# Patient Record
Sex: FEMALE | Race: BLACK OR AFRICAN AMERICAN | NOT HISPANIC OR LATINO | Employment: OTHER | URBAN - METROPOLITAN AREA
[De-identification: names, ages, dates, MRNs, and addresses within clinical notes are randomized per-mention and may not be internally consistent; named-entity substitution may affect disease eponyms.]

---

## 2017-11-13 ENCOUNTER — TRANSFERRED RECORDS (OUTPATIENT)
Dept: HEALTH INFORMATION MANAGEMENT | Facility: CLINIC | Age: 52
End: 2017-11-13

## 2017-11-14 ENCOUNTER — TRANSFERRED RECORDS (OUTPATIENT)
Dept: HEALTH INFORMATION MANAGEMENT | Facility: CLINIC | Age: 52
End: 2017-11-14

## 2017-12-02 ENCOUNTER — TRANSFERRED RECORDS (OUTPATIENT)
Dept: HEALTH INFORMATION MANAGEMENT | Facility: CLINIC | Age: 52
End: 2017-12-02

## 2017-12-05 ENCOUNTER — OFFICE VISIT (OUTPATIENT)
Dept: ENDOCRINOLOGY | Facility: CLINIC | Age: 52
End: 2017-12-05

## 2017-12-05 VITALS
WEIGHT: 186.8 LBS | HEIGHT: 64 IN | SYSTOLIC BLOOD PRESSURE: 132 MMHG | HEART RATE: 62 BPM | DIASTOLIC BLOOD PRESSURE: 88 MMHG | BODY MASS INDEX: 31.89 KG/M2

## 2017-12-05 DIAGNOSIS — N20.0 KIDNEY STONE: Primary | ICD-10-CM

## 2017-12-05 DIAGNOSIS — M81.8 OTHER OSTEOPOROSIS WITHOUT CURRENT PATHOLOGICAL FRACTURE: ICD-10-CM

## 2017-12-05 NOTE — PROGRESS NOTES
[facility]inology [facility]patient [facility]    CC: F/u visit for pituitary macroadenoma.     HPI: Mrs. Flores is a 52 year old [place] woman that is seen today for follow-up of a pituitary macroadenoma. The interview was facilitated by an . The patient was last seen in the endocrine clinic in 6/2015.   Briefly, Mrs. Flores  had a pituitary macroadenoma diagnosed in 2008. At the time, Mrs. Flores was complaining of headaches. She had a MRI done which revealed a sellar mass, with no evidence of hormone excess or deficiency at that time. On 7/08, cortisol was 19.3, prolactin 8, TSH 1.3, FT4 0.76 (replaced with Levothyroxine for couple of months and then discontinued), LH 35, FSH 58, and normal alpha subunit. On 4/09, cortisol 14.6, TSH 2.84, FT4 1.06, prolactin 6, IGF-1 111. She was seen by Dr. Omer in our clinic initially in 2/2010, and repeated hormonal evaluation was essentially unchanged. She was seen by neurophthalmology in 2/2010, and was found to have no visual field defects.     When she was seen in the endocrine clinic in 2015, it was found that her tumor had been stable in size. There were some concern for Cushing's disease based on a positive dexamethasone suppression test on 12/14/13 but a second test on 1/17/12 was normal. Subsequent tests showed increased ACTH and increased free urinary cortisol.  A dexamethasone tests was ordered however it is unclear whether or not the patient did the test.  At some point she reported she had it done in [place], taking the dexamethasone at 11 PM on a Sunday, and having blood work done at 7:30 AM the next morning. I never received the results of these tests and was not able to locate them.    Ms. Flores continues to complain to report headache.  Headache has been noted since 2008, but the frequency and intensity of the headache episodes have varied over the years. Tylenol offers good relief.  Headaches are pressure-like, specially in the temporal and  occipital areas, some as prior. Mrs. Flores have a history of nausea and some dizziness that started at about the same time as the headaches, but this has now improved.  She also reports her eyesight is getting worse, with no evident field defect. The patient denies galactorrhea or breast symptoms. Last menstrual period was about 16 years ago, after having her last baby, which she breast fed for 3 years. Menarche was at age of 13 with regular periods afterwards. She now reports she has been gaining some weight. She denies polyuria or polydipsia.     The patient was seen at [facility] not long ago, and reports having multiple tests done there.  I do not have access to these records and will try to obtain information from [facility] while the patient is  in clinic.  Previous MRI (2015 showed that the pituitary macroadenoma was stable in size (see below).    Mrs. Flores had a right leg (knee) fracture in 11/2008. She was found to have mild vitamin D deficiency, with 25-OH vitamin D levels of 24, and a PTH of 115. She was placed on vitamin D2 50,000 units per week for 12 weeks, followed by maintenance with vitamin D3 2,000 units daily.  Vitamin D is no longer on her medication list and she is not sure whether or not she is taking it regularly.    She was diagnosed with hypertension and is on hydrochlorothiazide 12.5 mg/day. Mrs. Flores denies other health issues.     The tumor size was reported as follows:   7/08 1.4 x 2.1 x 1.3 cm   5/09 1.5 x 2.3 x 1.3 cm   12/09 1.5 x 2.5 x 1.6 cm   6/10 1.3 x 2.3 x 1.3 cm   11/11 2.0 x 1.2 cm  1/14 1.8 x 0.9 cm  4/15 1.8 x 1.2 x 1.9 cm     ROS:  11 point ROS as detailed in the HPI, below, or negative  Constitutional: no fevers, chills, fatigue, unintentional weight gain/loss, or change in appetite   HEENT: Endorses headaches and no vision changes; no diplopia  CV: no chest pain or palpitations.   RESP: no dyspnea, cough, SOB.   GI: no vomiting.    Endo: no polyuria, polydipsia,  "temperature intolerance or sleep problems     Medications  Current Outpatient Prescriptions   Medication     neomycin-polymyxin-dexamethasone (MAXITROL) 3.5-20006-0.1 SUSP     ibuprofen 200 MG capsule     dexamethasone (DECADRON) 1 MG tablet     hydrochlorothiazide (MICROZIDE) 12.5 MG capsule     pantoprazole sodium 40 MG tablet     indomethacin (INDOCIN) 50 MG capsule     UNKNOWN TO PATIENT     UNKNOWN TO PATIENT     Polyethylene Glycol POWD     ranitidine (ZANTAC) 150 MG tablet     Albuterol Sulfate (VENTOLIN HFA) 108 (90 BASE) MCG/ACT AERS     No current facility-administered medications for this visit.      Family Hx   No Family Hx of pituitary disease, asthma or osteoporosis.   Has 4 healthy children, 24 yrs old girl, 18 yrs old twin boys and 16 yrs old boy.     Social Hx   Behavioral history: No tobacco use.   Home environment: No secondhand tobacco smoke in home.   Mrs. Flores does not work.   She lives with her  and their 4 children. Mrs. Flores lives in [place].  She does not smoke or consume alcohol.     PMH   Illnesses:   Non functioning (?) pituitary tumor as per HPI   Premature ovarian failure   Bone fracture   Surgeries:   Right knee (11/2008)     Menstrual History:   Menarche at age 13   Cycles were regular   Gestae 8 Para 4 (one gemellar pregnancy)   Menopause 14 yrs ago.     Physical Exam:  /88 (BP Location: Right arm, Patient Position: Sitting, Cuff Size: Adult Regular)  Pulse 62  Ht 1.626 m (5' 4\")  Wt 84.7 kg (186 lb 12.8 oz)  BMI 32.06 kg/m2  There is no height or weight on file to calculate BMI.  General : Alert, oriented X4, no acute distress, no pallor, jaundice or cyanosis.   HEENT : NCAT, EOIM, no scleral icterus or proptosis. Gross evaluation of the visual fields by confrontation is normal/small defect on the upper lateral gaze    thyroid/lymph: no LN enlargement, thyroid is normal in size   Lungs : rhonchi scattered throughout, normal excursion   CV: Normal S1, Normal S2, " No Murmurs   Abdomen : soft and lax, non tender, no hepatosplenomegaly, no ascites, bowel sounds are present.   Neuro : CN II-XII intact; tone, power and reflexes are normal and symmetrical in upper and lower extremities, no tremor of outstretched hands   Extremities : pulses are ++ in both limbs in upper and lower extremities, no ulcers, no edema.   skin: normal temperature, turgor, texture and thickness; no rashes on exposed skin. No hyperpigmentation of palmar creases   Psychological: appropriate mood and affect    Imaging Results   MRI BRAIN 16 Jun 2010 - MRI BRAIN W AND WO CONTRAST*   Findings: A sellar mass is noted measuring 1.3 x 2.3 x 1.3 cm (AP by transverse by craniocaudal, not significantly changed from remeasurement of comparison MRI 12/16/2009. The heterogeneously enhancing mass expands the sella with iso to hyperintensity on T1-weighted images and mild hyperintensity on T2-weighted images. The pituitary stalk is slightly deviated to the right, unchanged. There is no displacement of the optic chiasm. The carotid flow-voids are patent.   Trace periventricular T2 hyperintensity is again noted with a small focal area of increased T2 signal adjacent to the lateral horn of the left lateral ventricles similar to outside MRI 5/12/2009.   Impression: No change in size of sellar mass lesion compatible with a pituitary macroadenoma.     DEXA HIP PELVIS SPINE 12 May 2010   Presuming that the patient is not postmenopausal, the lowest and valid Z-score of -4.8 at the level of the lumbar spine is BELOW the expected range for age. (see Ref. #3) According to the ISCD position statements at www.iscd.org, a Z-score more negative than -2.0 is below expected range for age. Note the wide range in BMD values and T- scores within the lumbar   spine (L2 at -3.5, L3 at -2.3). This pattern suggests degenerative joint disease or occult fractures at the lumbar level that would limit the detection of low bone density in the L1 -  L4 spine region. The lumbar spine is therefore represented by L1-L2. Note the very negative Z- score within the lumbar spine.This suggests that secondary causes of low bone density might be present. Repeat DXA testing in 1-2 years could be considered. Clinical correlation recommended.     CHEST TWO VIEW* 04 Feb 2010 1  Findings: Haziness is seen within the lower lungs which likely represents summation shadow. No definite airspace opacities are seen. Cardiac silhouette and pulmonary vasculature are within normal limits. There is no pneumothorax.   Impression: No acute airspace opacity.     Brain/Pituitary MRI without and with contrast  Findings:   1.8 x 0.9 cm hypoenhancing left sellar mass (series 13, image 10), previously 2.2 x 1.2 cm on 11/30/2011 and 2.5 x 1.3 cm on 11/8/2010. On coronal images, the mass now demonstrates a concave superior margin, previously convex on 11/8/2010 and intermediate 11/30/2011. The mass continues to invade the left cavernous sinus with enhancing  soft tissue encasing the cavernous portion of the left carotid artery and displacing the right internal carotid artery. Carotid flow-voids are patent. There is erosion of the dorsum sella. The pituitary sac is deviated to the right. There is preserved CSF space between the mass and the optic chiasm and optic nerves. The ventricles are not enlarged  out of proportion to the surgical sulci. Diffusion-weighted images demonstrate no evidence of acute infarct. No intracranial hemorrhage, mass effect, midline shift or extra-axial  fluid collection. Minimal nonspecific punctate periventricular white matter T2 FLAIR hyperintensities, unchanged since previous exam. Calvarium and orbits are unremarkable. The paranasal sinuses and mastoid air cells are clear.  IMPRESSION  1. Decrease in size of left sellar mass, which invades the left cavernous sinus and encases the cavernous carotid artery, compatible with known pituitary macroadenoma. The mass does  not abut the optic chiasm or nerves. No hydrocephalus.  2. Stable minimal nonspecific periventricular white matter T2 FLAIR hyperintensity, which may represent early onset chronic small vessel ischemic disease or sequelae of prior vasculopathy, Infection/inflammation.    4/17/15  Brain/Pituitary MRI without and with contrast  History: Benign neoplasm of pituitary gland and craniopharyngeal duct (pouch).   Comparison: MRI dated 1/2/14   Findings: Again noted is a mass within the sella predominantly along the left lateral aspect with extension into the left cavernous sinus. The mass  appears isointense on the T1 and hypointense on the T2-weighted images and demonstrates enhancement. It encases the cavernous segment of the  left ICA. The mass measures 1.8 x 1.2 x 1.9 cm (TR, CC, AP), and is unchanged in size and appearance since prior study. There is no  extension into the suprasellar cistern. No mass effect on the optic chiasm. The pituitary stalk is displaced to the level right as before.  The major cavernous carotid vascular flow-voids appear patent.  No mass effect, midline shift, or significant enlargement of the  ventricles. Minimal T2 hyperintensity are seen in the bilateral periventricular white matter are nonspecific and unchanged since prior  study.  Impression:   1. Stable appearance of mass within the sella predominantly along the left lateral aspect with extension into the left cavernous sinus,  consistent with known pituitary macroadenoma. No mass effect on the optic chiasm. No hydrocephalus.  2. Unchanged minimal nonspecific periventricular white matter T2 hyperintensities likely representing early onset of chronic small  vessel ischemic changes.    Lab results  ENDO PITUITARY LABS-[facility] Latest Ref Rng 1/1/2014 12/3/2013   TSH 0.4 - 5.0 mU/L  1.54   PROLACTIN 3 - 27 ug/L  5   PROLACTIN   4.4   CORTISOL FREE, URINE  69.5 (H)    CORTISOL FREE, URINE RANDOM  45.70    CORTISOL UG/G CRT  47.60    CORTISOL,  SERUM 4 - 22 ug/dL  11.9   ADRENAL CORTICOTROPIN 10 - 47 pg/mL  200 (H)   FSH   50.5   LUTROPIN   26.6   ESTRADIOL   30    - 144 mmol/L  143   POTASSIUM 3.4 - 5.3 mmol/L  3.9       ENDO PITUITARY LABS-[facility] [[email or web address] Latest Ref Rng 4/12/2012   CORTISOL FREE, URINE  17.3   CORTISOL FREE, URINE RANDOM  10.90   CORTISOL UG/G CRT  21.80     [facility]2012   CORTISOL FREE, URINE  29.7   CORTISOL FREE, URINE RANDOM  53.00   CORTISOL UG/G CRT  44.17     [facility] Latest Ref Rng 1/17/2012   CORTISOL, SERUM 4 - 22 ug/dL 14.9   ADRENAL CORTICOTROPIN 10 - 47 pg/mL 197 (H)    - 144 mmol/L 143   POTASSIUM 3.4 - 5.3 mmol/L 4.0     [facility] Latest Ref Rng 12/14/2011   CORTISOL, SERUM 4 - 22 ug/dL 3.4 (L)   ADRENAL CORTICOTROPIN 10 - 47 pg/mL 72 (H)     [facility] Latest Ref Rng 12/9/2011   CORTISOL FREE, URINE  41.2   CORTISOL FREE, URINE RANDOM  24.70   CORTISOL UG/G CRT  34.31     [facility] Latest Ref Rng 12/7/2011   TSH 0.4 - 5.0 mU/L 1.10   PROLACTIN 3 - 27 ug/L 6   CORTISOL, SERUM 4 - 22 ug/dL 13.6   ADRENAL CORTICOTROPIN 10 - 47 pg/mL 139 (H)   FSH  43.0   LUTROPIN  22.6   INSULIN GROWTH FACTOR 1 94 - 252 ng/mL 78 (L)   INSULIN GROWTH FACTOR 1 SD SCORE  NEG 2.4   GLUCOSE 60 - 99 mg/dL 82     [facility] Latest Ref Rng 11/8/2010   CORTISOL FREE, URINE  31.3   CORTISOL FREE, URINE RANDOM  32.20   CORTISOL UG/G CRT  33.89     [facility] Latest Ref Rng 11/2/2010   TSH 0.4 - 5.0 mU/L 1.44   PROLACTIN 3 - 27 ug/L 6   CORTISOL, SERUM 4 - 22 ug/dL 15.8   ADRENAL CORTICOTROPIN 10 - 47 pg/mL 253 (H)   FSH  45.7   LUTROPIN  28.1   ESTRADIOL  19   URINE OSMOLALITY 100 - 1200 mmol/kg 727   INSULIN GROWTH FACTOR 1 94 - 252 ng/mL 140   INSULIN GROWTH FACTOR 1 SD SCORE  NEG 1.1   GLUCOSE 60 - 99 mg/dL 83     END[facility] PITUITARY LABS-[email or web address] Latest Ref Rng 5/6/2010 2/4/2010   TSH 0.4 - 5.0 mU/L 1.62 1.74   PROLACTIN 3 - 27 ug/L  7    CORTISOL, SERUM 4 - 22 ug/dL  14.0   FSH   49.8   LUTROPIN   33.7   ESTRADIOL   11   INSULIN GROWTH FACTOR 1 94 - 252 ng/mL  101   INSULIN GROWTH FACTOR 1 SD SCORE   NEG 2.0   GLUCOSE 60 - 99 mg/dL       ENDO PITUITARY LABS-[facility] Latest Ref Rng 4/15/2009   TSH 0.4 - 5.0 mU/L 2.84   PROLACTIN 3 - 27 ug/L 6   CORTISOL, SERUM 4 - 22 ug/dL 14.6   TESTOSTERONE TOTAL 14 - 75 ng/dL 35   INSULIN GROWTH FACTOR 1 94 - 252 ng/mL 111   INSULIN GROWTH FACTOR 1 SD SCORE  NEG 1.8       Assessment and Plan:  Mrs. Flores is a 52 year old woman with a pituitary macroadenoma.   1. Pituitary macroadenoma: Previous evaluation suggested this is a non-functioning lesion that is growing slowly. There was no clinical or biochemical evidence of hyperprolactinemia. LH and FSH levels were appropriately elevated in the setting of primary ovarian failure. Patient had symptoms suggestive of hypothyroidism and was placed on levothyroxine in the past. Repeated TFT after hormone replacement was discontinued were normal. Mrs. Flores has normal IGF-1 levels and has no signs or symptoms of diabetes insipidus. However, I was concern the patient had Cushing's disease. ACTH levels and 24h-urinary cortisol were elevated. I had asked Ms. Flores to do a dexamethasone suppression test when I last saw her, and also had my nursing staff following up with her in that regard, but despite the fact that the pt believes she did it, I cannot find evidence that she did draw blood early AM for cortisol levels (this would presumptively be after dexamethasone on the prior night).  I will wait for the records from [facility], as patient states all was found to be normal, to determine whether or not additional work-up is needed. I will again refer the patient to opthalmology, for a formal campimetry.    2. Vitamin D deficiency: Mrs. Flores has a history of bone fracture and low vitamin D levels, along with increased PTH levels. Patient is on vitamin D replacement.  We  will consider repeating her DEXA scan.    Plan was discussed with patient in detail, with the help of an , and she verbalizes understanding.  The patient has previously provided me with her daughter's phone number [phone], [place], and she gave me authorization to discuss any aspect related to her health with her daughter.      Orders Placed This Encounter   Procedures     OPHTHALMOLOGY ADULT REFERRAL       Maine James MD PhD    Division of Endocrinology and Diabetes      Addendum:  I received a fax with the data from patient's recent evaluation at [facility]. No images were sent.    Results from November 14, 2017, collect at 9:15 AM, show:  CBC: normal; 25-hydroxy vitamin D: Total 20, D3 20, D2 <4  Sodium 141, potassium 4.5, calcium 9.7, glucose at 85, alkaline phosphatase 87, AST 16, creatinine 0.8, albumin 4.0.  TSH 1.7, free T4 0.9, prolactin 7.3, FSH 44.8, IGF-I 90; ACTH 160 (7.2-63); cortisol 11  24 hour urine collection: Urinary volume 1390 ml; Calcium 167 mg per 24 hours, creatinine 917 mg per 24 hours, cortisol 43  g per 24 hours    DEXA scan was done on that same day and showed lowest T-score is -3.5    Brain MRI - November 7, 2017, interpretation reads as follows:   Large pituitary adenoma present in 2009 and 2010 and has sparsely regressed in the 2011 exam and almost completely resolved with in the 2015 and current exam.  Focal defect in the left side of the pituitary gland, where the prior mass was once noted. The normal plan this displaced to the right, with moderate rightward deviation of the pituitary stalk. There remains some hypoenhancing material in the medial aspect of the left cavernous sinus of uncertain significance; the initial studies showed a subtotal encasement of the left cavernous ICA by tumor and that this material has almost completely resolved except for the his small residual hypoenhancing region.  The component of the tumor extending  posteriorly over the left Meckel's cave was present on the initial 3 studies, but that this component has also almost completely resolved.  No evidence of new tumor.  The suprasellar cistern is free of mass.  The mild to generalized cerebral and cerebral volume loss is stable since 2009.  Reminder normal.    Based on these results, we will intensify vitamin D replacement therapy and discuss bisphosphonate use and plan again for a dexamethasone suppression test.  We will try to obtain a CD with the patient's most recent  MRI.    Maine James MD PhD    Division of Endocrinology and Diabetes

## 2017-12-05 NOTE — MR AVS SNAPSHOT
After Visit Summary   12/5/2017    Francis Flores    MRN: [id number]           Patient Information     Date Of Birth          1965        Visit Information        Provider Department      12/5/2017 11:15 AM Chantell James MD; [employer] [facility] Endocrinology        Today's Diagnoses     Kidney stone    -  1    Other osteoporosis without current pathological fracture           Follow-ups after your visit        Additional Services     OPHTHALMOLOGY ADULT REFERRAL       Your provider has referred you to: [facility]: [facility] [place] [phone]   [email or web address]    Please be aware that coverage of these services is subject to the terms and limitations of your health insurance plan.  Call member services at your health plan with any benefit or coverage questions.      Please bring the following with you to your appointment:    (1) Any X-Rays, CTs or MRIs which have been performed.  Contact the facility where they were done to arrange for  prior to your scheduled appointment.    (2) List of current medications  (3) This referral request   (4) Any documents/labs given to you for this referral                  Follow-up notes from your care team     Return in about 4 months (around 4/5/2018).      Your next 10 appointments already scheduled     Dec 08, 2017  1:00 PM CST   (Arrive by 12:45 PM)   NEW GENERAL with Alberto Spear OD   [facility] Ophthalmology ([facility])    [address] [place] [address]  [address] [facility] [address] [phone]            May 01, 2018 12:00 PM CDT   (Arrive by 11:45 AM)   RETURN ENDOCRINE with Chantell James MD   [facility] End[facility] ([facility])    [address] [place] [address]  [address] [facility] [address] [phone]              Who to contact     Please call your clinic at [phone] to:    Ask questions about your  "health    Make or cancel appointments    Discuss your medicines    Learn about your test results    Speak to your doctor   If you have compliments or concerns about an experience at your clinic, or if you wish to file a complaint, please contact [facility] Patient Relations at [phone] or email us at [email or web address]         Additional Information About Your Visit        Out[employer] System[employer]art Information     [employer]apest[employer] is an electronic gateway that provides easy, online access to your medical records. With Forgame, you can request a clinic appointment, read your test results, renew a prescription or communicate with your care team.     To sign up for For[employer] visit the website at [email or web address]   You will be asked to enter the access code listed below, as well as some personal information. Please follow the directions to create your username and password.     Your access code is: [email or web address]  Expires: 2018  6:31 AM     Your access code will  in 90 days. If you need help or a new code, please contact your [facility] or call [phone] for assistance.        Care EveryWhere ID     This is your Care EveryWhere ID. This could be used by other organizations to access your [facility] medical records  [id number]        Your Vitals Were     Pulse Height BMI (Body Mass Index)             62 1.626 m (5' 4\") 32.06 kg/m2          Blood Pressure from Last 3 Encounters:   17 132/88   06/10/15 111/64   04/08/15 120/77    Weight from Last 3 Encounters:   17 84.7 kg (186 lb 12.8 oz)   06/10/15 80.7 kg (177 lb 14.4 oz)   04/08/15 80.2 kg (176 lb 14.4 oz)              We Performed the Following     OPHTHALMOLOGY ADULT REFERRAL          Today's Medication Changes          These changes are accurate as of: 17 12:45 PM.  If you have any questions, ask your nurse or doctor.               Stop taking these medicines if you haven't " already. Please contact your care team if you have questions.     dexamethasone 1 MG tablet   Commonly known as:  DECADRON   Stopped by:  Chantell James MD           hydrochlorothiazide 12.5 MG capsule   Commonly known as:  MICROZIDE   Stopped by:  Chantell James MD           indomethacin 50 MG capsule   Commonly known as:  INDOCIN   Stopped by:  Chantell James MD           neomycin-polymyxin-dexamethasone 3.5-59294-4.1 Susp ophthalmic susp   Commonly known as:  MAXITROL   Stopped by:  Chantell James MD           Polyethylene Glycol Powd   Stopped by:  Chantell James MD           UNKNOWN TO PATIENT   Stopped by:  Chantell James MD           VENTOLIN  (90 BASE) MCG/ACT Inhaler   Generic drug:  albuterol   Stopped by:  Chantell James MD                    Primary Care Provider Office Phone # Fax #    Eliu Dewey [phone]       [facility] [address] [facility] [address]        Equal Access to Services     [facility]: Hadii aad ku hadasho Socourtney, waaxda luqadaha, qaybta kaalmada dana, holly foley . So [facility] [phone].    ATENCIÓN: Si habla español, tiene a nunez disposición servicios gratuitos de asistencia lingüística. [facility] [phone].    We comply with applicable federal civil rights laws and [place] laws. We do not discriminate on the basis of race, color, national origin, age, disability, sex, sexual orientation, or gender identity.            Thank you!     Thank you for choosing [facility] ENDOCRINOLOGY  for your care. Our goal is always to provide you with excellent care. Hearing back from our patients is one way we can continue to improve our services. Please take a few minutes to complete the written survey that you may receive in the mail after your visit with us. Thank you!             Your Updated  Medication List - Protect others around you: Learn how to safely use, store and throw away your medicines at www.disposemymeds.org.          This list is accurate as of: 12/5/17 12:45 PM.  Always use your most recent med list.                   Brand Name Dispense Instructions for use Diagnosis    ibuprofen 200 MG capsule     60 capsule    Take 400 mg by mouth every 4 hours as needed for fever    Pituitary adenoma (H)       pantoprazole sodium 40 MG packet    PROTONIX     Take 1 packet by mouth daily.        ranitidine 150 MG tablet    ZANTAC     Take 1 tablet by mouth 2 times daily.

## 2017-12-05 NOTE — LETTER
12/5/2017       RE: Francis Flores  [address]   [facility] [address]     Dear Colleague,    Thank you for referring your patient, Francis Flores, to the [facility] ENDOCRINOLOGY at [facility]. Please see a copy of my visit note below.    Endocrinology and Diabetes Outpatient Clinic    CC: F/u visit for pituitary macroadenoma.     HPI: Mrs. Flores is a 52 year old [place] woman that is seen today for follow-up of a pituitary macroadenoma. The interview was facilitated by an . The patient was last seen in the endocrine clinic in 6/2015.   Briefly, Mrs. Flores  had a pituitary macroadenoma diagnosed in 2008. At the time, Mrs. Flores was complaining of headaches. She had a MRI done which revealed a sellar mass, with no evidence of hormone excess or deficiency at that time. On 7/08, cortisol was 19.3, prolactin 8, TSH 1.3, FT4 0.76 (replaced with Levothyroxine for couple of months and then discontinued), LH 35, FSH 58, and normal alpha subunit. On 4/09, cortisol 14.6, TSH 2.84, FT4 1.06, prolactin 6, IGF-1 111. She was seen by Dr. Omer in our clinic initially in 2/2010, and repeated hormonal evaluation was essentially unchanged. She was seen by neurophthalmology in 2/2010, and was found to have no visual field defects.     When she was seen in the endocrine clinic in 2015, it was found that her tumor had been stable in size. There were some concern for Cushing's disease based on a positive dexamethasone suppression test on 12/14/13 but a second test on 1/17/12 was normal. Subsequent tests showed increased ACTH and increased free urinary cortisol.  A dexamethasone tests was ordered however it is unclear whether or not the patient did the test.  At some point she reported she had it done in [place], taking the dexamethasone at 11 PM on a Sunday, and having blood work done at 7:30 AM the next morning. I never received the results of these tests and was  not able to locate them.    Ms. Flores continues to complain to report headache.  Headache has been noted since 2008, but the frequency and intensity of the headache episodes have varied over the years. Tylenol offers good relief.  Headaches are pressure-like, specially in the temporal and occipital areas, some as prior. Mrs. Flores have a history of nausea and some dizziness that started at about the same time as the headaches, but this has now improved.  She also reports her eyesight is getting worse, with no evident field defect. The patient denies galactorrhea or breast symptoms. Last menstrual period was about 16 years ago, after having her last baby, which she breast fed for 3 years. Menarche was at age of 13 with regular periods afterwards. She now reports she has been gaining some weight. She denies polyuria or polydipsia.     The patient was seen at [facility] not long ago, and reports having multiple tests done there.  I do not have access to these records and will try to obtain information from [facility] while the patient is  in clinic.  Previous MRI (2015 showed that the pituitary macroadenoma was stable in size (see below).    Mrs. Flores had a right leg (knee) fracture in 11/2008. She was found to have mild vitamin D deficiency, with 25-OH vitamin D levels of 24, and a PTH of 115. She was placed on vitamin D2 50,000 units per week for 12 weeks, followed by maintenance with vitamin D3 2,000 units daily.  Vitamin D is no longer on her medication list and she is not sure whether or not she is taking it regularly.    She was diagnosed with hypertension and is on hydrochlorothiazide 12.5 mg/day. Mrs. Flores denies other health issues.     The tumor size was reported as follows:   7/08 1.4 x 2.1 x 1.3 cm   5/09 1.5 x 2.3 x 1.3 cm   12/09 1.5 x 2.5 x 1.6 cm   6/10 1.3 x 2.3 x 1.3 cm   11/11 2.0 x 1.2 cm  1/14 1.8 x 0.9 cm  4/15 1.8 x 1.2 x 1.9 cm     ROS:  11 point ROS as detailed in the HPI, below, or  "negative  Constitutional: no fevers, chills, fatigue, unintentional weight gain/loss, or change in appetite   HEENT: Endorses headaches and no vision changes; no diplopia  CV: no chest pain or palpitations.   RESP: no dyspnea, cough, SOB.   GI: no vomiting.    Endo: no polyuria, polydipsia, temperature intolerance or sleep problems     Medications  Current Outpatient Prescriptions   Medication     neomycin-polymyxin-dexamethasone (MAXITROL) 3.5-09661-3.1 SUSP     ibuprofen 200 MG capsule     dexamethasone (DECADRON) 1 MG tablet     hydrochlorothiazide (MICROZIDE) 12.5 MG capsule     pantoprazole sodium 40 MG tablet     indomethacin (INDOCIN) 50 MG capsule     UNKNOWN TO PATIENT     UNKNOWN TO PATIENT     Polyethylene Glycol POWD     ranitidine (ZANTAC) 150 MG tablet     Albuterol Sulfate (VENTOLIN HFA) 108 (90 BASE) MCG/ACT AERS     No current facility-administered medications for this visit.      Family Hx   No Family Hx of pituitary disease, asthma or osteoporosis.   Has 4 healthy children, 24 yrs old girl, 18 yrs old twin boys and 16 yrs old boy.     Social Hx   Behavioral history: No tobacco use.   Home environment: No secondhand tobacco smoke in home.   Mrs. Flores does not work.   She lives with her  and their 4 children. Mrs. Flores lives in [place].  She does not smoke or consume alcohol.     PMH   Illnesses:   Non functioning (?) pituitary tumor as per HPI   Premature ovarian failure   Bone fracture   Surgeries:   Right knee (11/2008)     Menstrual History:   Menarche at age 13   Cycles were regular   Gestae 8 Para 4 (one gemellar pregnancy)   Menopause 14 yrs ago.     Physical Exam:  /88 (BP Location: Right arm, Patient Position: Sitting, Cuff Size: Adult Regular)  Pulse 62  Ht 1.626 m (5' 4\")  Wt 84.7 kg (186 lb 12.8 oz)  BMI 32.06 kg/m2  There is no height or weight on file to calculate BMI.  General : Alert, oriented X4, no acute distress, no pallor, jaundice or cyanosis.   HEENT : NCAT, " EOIM, no scleral icterus or proptosis. Gross evaluation of the visual fields by confrontation is normal/small defect on the upper lateral gaze    thyroid/lymph: no LN enlargement, thyroid is normal in size   Lungs : rhonchi scattered throughout, normal excursion   CV: Normal S1, Normal S2, No Murmurs   Abdomen : soft and lax, non tender, no hepatosplenomegaly, no ascites, bowel sounds are present.   Neuro : CN II-XII intact; tone, power and reflexes are normal and symmetrical in upper and lower extremities, no tremor of outstretched hands   Extremities : pulses are ++ in both limbs in upper and lower extremities, no ulcers, no edema.   skin: normal temperature, turgor, texture and thickness; no rashes on exposed skin. No hyperpigmentation of palmar creases   Psychological: appropriate mood and affect    Imaging Results   MRI BRAIN 16 Jun 2010 - MRI BRAIN W AND WO CONTRAST*   Findings: A sellar mass is noted measuring 1.3 x 2.3 x 1.3 cm (AP by transverse by craniocaudal, not significantly changed from remeasurement of comparison MRI 12/16/2009. The heterogeneously enhancing mass expands the sella with iso to hyperintensity on T1-weighted images and mild hyperintensity on T2-weighted images. The pituitary stalk is slightly deviated to the right, unchanged. There is no displacement of the optic chiasm. The carotid flow-voids are patent.   Trace periventricular T2 hyperintensity is again noted with a small focal area of increased T2 signal adjacent to the lateral horn of the left lateral ventricles similar to outside MRI 5/12/2009.   Impression: No change in size of sellar mass lesion compatible with a pituitary macroadenoma.     DEXA HIP PELVIS SPINE 12 May 2010   Presuming that the patient is not postmenopausal, the lowest and valid Z-score of -4.8 at the level of the lumbar spine is BELOW the expected range for age. (see Ref. #3) According to the ISCD position statements at www.iscd.org, a Z-score more negative than  -2.0 is below expected range for age. Note the wide range in BMD values and T- scores within the lumbar   spine (L2 at -3.5, L3 at -2.3). This pattern suggests degenerative joint disease or occult fractures at the lumbar level that would limit the detection of low bone density in the L1 - L4 spine region. The lumbar spine is therefore represented by L1-L2. Note the very negative Z- score within the lumbar spine.This suggests that secondary causes of low bone density might be present. Repeat DXA testing in 1-2 years could be considered. Clinical correlation recommended.     CHEST TWO VIEW* 04 Feb 2010 1  Findings: Haziness is seen within the lower lungs which likely represents summation shadow. No definite airspace opacities are seen. Cardiac silhouette and pulmonary vasculature are within normal limits. There is no pneumothorax.   Impression: No acute airspace opacity.     Brain/Pituitary MRI without and with contrast  Findings:   1.8 x 0.9 cm hypoenhancing left sellar mass (series 13, image 10), previously 2.2 x 1.2 cm on 11/30/2011 and 2.5 x 1.3 cm on 11/8/2010. On coronal images, the mass now demonstrates a concave superior margin, previously convex on 11/8/2010 and intermediate 11/30/2011. The mass continues to invade the left cavernous sinus with enhancing  soft tissue encasing the cavernous portion of the left carotid artery and displacing the right internal carotid artery. Carotid flow-voids are patent. There is erosion of the dorsum sella. The pituitary sac is deviated to the right. There is preserved CSF space between the mass and the optic chiasm and optic nerves. The ventricles are not enlarged  out of proportion to the surgical sulci. Diffusion-weighted images demonstrate no evidence of acute infarct. No intracranial hemorrhage, mass effect, midline shift or extra-axial  fluid collection. Minimal nonspecific punctate periventricular white matter T2 FLAIR hyperintensities, unchanged since previous exam.  Calvarium and orbits are unremarkable. The paranasal sinuses and mastoid air cells are clear.  IMPRESSION  1. Decrease in size of left sellar mass, which invades the left cavernous sinus and encases the cavernous carotid artery, compatible with known pituitary macroadenoma. The mass does not abut the optic chiasm or nerves. No hydrocephalus.  2. Stable minimal nonspecific periventricular white matter T2 FLAIR hyperintensity, which may represent early onset chronic small vessel ischemic disease or sequelae of prior vasculopathy, Infection/inflammation.    4/17/15  Brain/Pituitary MRI without and with contrast  History: Benign neoplasm of pituitary gland and craniopharyngeal duct (pouch).   Comparison: MRI dated 1/2/14   Findings: Again noted is a mass within the sella predominantly along the left lateral aspect with extension into the left cavernous sinus. The mass  appears isointense on the T1 and hypointense on the T2-weighted images and demonstrates enhancement. It encases the cavernous segment of the  left ICA. The mass measures 1.8 x 1.2 x 1.9 cm (TR, CC, AP), and is unchanged in size and appearance since prior study. There is no  extension into the suprasellar cistern. No mass effect on the optic chiasm. The pituitary stalk is displaced to the level right as before.  The major cavernous carotid vascular flow-voids appear patent.  No mass effect, midline shift, or significant enlargement of the  ventricles. Minimal T2 hyperintensity are seen in the bilateral periventricular white matter are nonspecific and unchanged since prior  study.  Impression:   1. Stable appearance of mass within the sella predominantly along the left lateral aspect with extension into the left cavernous sinus,  consistent with known pituitary macroadenoma. No mass effect on the optic chiasm. No hydrocephalus.  2. Unchanged minimal nonspecific periventricular white matter T2 hyperintensities likely representing early onset of chronic  small  vessel ischemic changes.    Lab results  ENDO PITUITARY LABS-[facility] Latest Ref Rng 1/1/2014 12/3/2013   TSH 0.4 - 5.0 mU/L  1.54   PROLACTIN 3 - 27 ug/L  5   PROLACTIN   4.4   CORTISOL FREE, URINE  69.5 (H)    CORTISOL FREE, URINE RANDOM  45.70    CORTISOL UG/G CRT  47.60    CORTISOL, SERUM 4 - 22 ug/dL  11.9   ADRENAL CORTICOTROPIN 10 - 47 pg/mL  200 (H)   FSH   50.5   LUTROPIN   26.6   ESTRADIOL   30    - 144 mmol/L  143   POTASSIUM 3.4 - 5.3 mmol/L  3.9       ENDO PITUITARY LABS-[facility] Latest Ref Rng 4/12/2012   CORTISOL FREE, URINE  17.3   CORTISOL FREE, URINE RANDOM  10.90   CORTISOL UG/G CRT  21.80     ENDO PITUITARY LABS-[facility] Latest Ref Rng 2/2/2012   CORTISOL FREE, URINE  29.7   CORTISOL FREE, URINE RANDOM  53.00   CORTISOL UG/G CRT  44.17     ENDO PITUITARY LABS-[facility] Latest Ref Rng 1/17/2012   CORTISOL, SERUM 4 - 22 ug/dL 14.9   ADRENAL CORTICOTROPIN 10 - 47 pg/mL 197 (H)    - 144 mmol/L 143   POTASSIUM 3.4 - 5.3 mmol/L 4.0     ENDO PITUITARY LABS-[facility] Latest Ref Rng 12/14/2011   CORTISOL, SERUM 4 - 22 ug/dL 3.4 (L)   ADRENAL CORTICOTROPIN 10 - 47 pg/mL 72 (H)     ENDO PITUITARY LABS-[facility] Latest Ref Rng 12/9/2011   CORTISOL FREE, URINE  41.2   CORTISOL FREE, URINE RANDOM  24.70   CORTISOL UG/G CRT  34.31     ENDO PITUITARY LABS-[facility] Latest Ref Rng 12/7/2011   TSH 0.4 - 5.0 mU/L 1.10   PROLACTIN 3 - 27 ug/L 6   CORTISOL, SERUM 4 - 22 ug/dL 13.6   ADRENAL CORTICOTROPIN 10 - 47 pg/mL 139 (H)   FSH  43.0   LUTROPIN  22.6   INSULIN GROWTH FACTOR 1 94 - 252 ng/mL 78 (L)   INSULIN GROWTH FACTOR 1 SD SCORE  NEG 2.4   GLUCOSE 60 - 99 mg/dL 82     ENDO PITUITARY LABS-[facility] Latest Ref Rng 11/8/2010   CORTISOL FREE, URINE  31.3   CORTISOL FREE, URINE RANDOM  32.20   CORTISOL UG/G CRT  33.89     ENDO PITUITARY LABS-[facility] Latest Ref Rng 11/2/2010   TSH 0.4 - 5.0 mU/L 1.44   PROLACTIN 3 - 27 ug/L 6   CORTISOL, SERUM 4 - 22 ug/dL 15.8   ADRENAL CORTICOTROPIN 10 - 47 pg/mL 253 (H)   FSH  45.7   LUTROPIN  28.1    ESTRADIOL  19   URINE OSMOLALITY 100 - 1200 mmol/kg 727   INSULIN GROWTH FACTOR 1 94 - 252 ng/mL 140   INSULIN GROWTH FACTOR 1 SD SCORE  NEG 1.1   GLUCOSE 60 - 99 mg/dL 83     ENDO PITUITARY LABS-[facility] Latest Ref Rng 5/6/2010 2/4/2010   TSH 0.4 - 5.0 mU/L 1.62 1.74   PROLACTIN 3 - 27 ug/L  7   CORTISOL, SERUM 4 - 22 ug/dL  14.0   FSH   49.8   LUTROPIN   33.7   ESTRADIOL   11   INSULIN GROWTH FACTOR 1 94 - 252 ng/mL  101   INSULIN GROWTH FACTOR 1 SD SCORE   NEG 2.0   GLUCOSE 60 - 99 mg/dL       ENDO PITUITARY LABS-[facility] Latest Ref Rng 4/15/2009   TSH 0.4 - 5.0 mU/L 2.84   PROLACTIN 3 - 27 ug/L 6   CORTISOL, SERUM 4 - 22 ug/dL 14.6   TESTOSTERONE TOTAL 14 - 75 ng/dL 35   INSULIN GROWTH FACTOR 1 94 - 252 ng/mL 111   INSULIN GROWTH FACTOR 1 SD SCORE  NEG 1.8       Assessment and Plan:  Mrs. Flores is a 52 year old woman with a pituitary macroadenoma.   1. Pituitary macroadenoma: Previous evaluation suggested this is a non-functioning lesion that is growing slowly. There was no clinical or biochemical evidence of hyperprolactinemia. LH and FSH levels were appropriately elevated in the setting of primary ovarian failure. Patient had symptoms suggestive of hypothyroidism and was placed on levothyroxine in the past. Repeated TFT after hormone replacement was discontinued were normal. Mrs. Flores has normal IGF-1 levels and has no signs or symptoms of diabetes insipidus. However, I was concern the patient had Cushing's disease. ACTH levels and 24h-urinary cortisol were elevated. I had asked Ms. Flores to do a dexamethasone suppression test when I last saw her, and also had my nursing staff following up with her in that regard, but despite the fact that the pt believes she did it, I cannot find evidence that she did draw blood early AM for cortisol levels (this would presumptively be after dexamethasone on the prior night).  I will wait for the records from [facility], as patient states all was found to be normal, to determine  whether or not additional work-up is needed. I will again refer the patient to opthalmology, for a formal campimetry.    2. Vitamin D deficiency: Mrs. Flores has a history of bone fracture and low vitamin D levels, along with increased PTH levels. Patient is on vitamin D replacement.  We will consider repeating her DEXA scan.    Plan was discussed with patient in detail, with the help of an , and she verbalizes understanding.  The patient has previously provided me with her daughter's phone number [phone], [place], and she gave me authorization to discuss any aspect related to her health with her daughter.      Orders Placed This Encounter   Procedures     OPHTHALMOLOGY ADULT REFERRAL       Maine James MD PhD    Division of Endocrinology and Diabetes      Addendum:  I received a fax with the data from patient's recent evaluation at [facility]. No images were sent.    Results from November 14, 2017, collect at 9:15 AM, show:  CBC: normal; 25-hydroxy vitamin D: Total 20, D3 20, D2 <4  Sodium 141, potassium 4.5, calcium 9.7, glucose at 85, alkaline phosphatase 87, AST 16, creatinine 0.8, albumin 4.0.  TSH 1.7, free T4 0.9, prolactin 7.3, FSH 44.8, IGF-I 90; ACTH 160 (7.2-63); cortisol 11  24 hour urine collection: Urinary volume 1390 ml; Calcium 167 mg per 24 hours, creatinine 917 mg per 24 hours, cortisol 43  g per 24 hours    DEXA scan was done on that same day and showed lowest T-score is -3.5    Brain MRI - November 7, 2017, interpretation reads as follows:   Large pituitary adenoma present in 2009 and 2010 and has sparsely regressed in the 2011 exam and almost completely resolved with in the 2015 and current exam.  Focal defect in the left side of the pituitary gland, where the prior mass was once noted. The normal plan this displaced to the right, with moderate rightward deviation of the pituitary stalk. There remains some hypoenhancing material in the medial aspect of the  left cavernous sinus of uncertain significance; the initial studies showed a subtotal encasement of the left cavernous ICA by tumor and that this material has almost completely resolved except for the his small residual hypoenhancing region.  The component of the tumor extending posteriorly over the left Meckel's cave was present on the initial 3 studies, but that this component has also almost completely resolved.  No evidence of new tumor.  The suprasellar cistern is free of mass.  The mild to generalized cerebral and cerebral volume loss is stable since 2009.  Reminder normal.    Based on these results, we will intensify vitamin D replacement therapy and discuss bisphosphonate use and plan again for a dexamethasone suppression test.  We will try to obtain a CD with the patient's most recent  MRI.    Maine James MD PhD    Division of Endocrinology and Diabetes

## 2017-12-05 NOTE — NURSING NOTE
"Chief Complaint   Patient presents with     RECHECK     PITUITARY ADENOMA        Initial /88 (BP Location: Right arm, Patient Position: Sitting, Cuff Size: Adult Regular)  Pulse 62  Ht 1.626 m (5' 4\")  Wt 84.7 kg (186 lb 12.8 oz)  BMI 32.06 kg/m2 Estimated body mass index is 32.06 kg/(m^2) as calculated from the following:    Height as of this encounter: 1.626 m (5' 4\").    Weight as of this encounter: 84.7 kg (186 lb 12.8 oz).  Medication Reconciliation: complete    "

## 2017-12-08 ENCOUNTER — OFFICE VISIT (OUTPATIENT)
Dept: OPHTHALMOLOGY | Facility: CLINIC | Age: 52
End: 2017-12-08

## 2017-12-08 DIAGNOSIS — H02.889 MEIBOMIAN GLAND DYSFUNCTION (MGD): ICD-10-CM

## 2017-12-08 DIAGNOSIS — D35.2 PITUITARY ADENOMA (H): Primary | ICD-10-CM

## 2017-12-08 DIAGNOSIS — H25.13 NUCLEAR SENILE CATARACT OF BOTH EYES: ICD-10-CM

## 2017-12-08 DIAGNOSIS — H52.03 HYPERMETROPIA OF BOTH EYES: ICD-10-CM

## 2017-12-08 ASSESSMENT — REFRACTION_MANIFEST
OD_ADD: +2.50
OS_AXIS: 055
OS_SPHERE: +0.75
OS_ADD: +2.50
OS_CYLINDER: +0.25
OD_SPHERE: +0.75
OD_CYLINDER: SPHERE

## 2017-12-08 ASSESSMENT — REFRACTION_WEARINGRX
OS_SPHERE: +0.50
SPECS_TYPE: BIFOCAL
OS_ADD: +2.50
OD_SPHERE: +0.25
OS_AXIS: 090
OD_ADD: +2.50
OS_CYLINDER: +0.25
OD_CYLINDER: SPHERE

## 2017-12-08 ASSESSMENT — CONF VISUAL FIELD
OS_INFERIOR_NASAL_RESTRICTION: 3
OD_INFERIOR_NASAL_RESTRICTION: 3
OS_INFERIOR_TEMPORAL_RESTRICTION: 3
METHOD: COUNTING FINGERS

## 2017-12-08 ASSESSMENT — VISUAL ACUITY
OD_CC: 20/25
METHOD: SNELLEN - LINEAR
CORRECTION_TYPE: GLASSES
OD_CC: J1
OS_CC: J1
OS_CC: 20/25
OD_CC+: +2

## 2017-12-08 ASSESSMENT — EXTERNAL EXAM - LEFT EYE: OS_EXAM: NORMAL

## 2017-12-08 ASSESSMENT — TONOMETRY
IOP_METHOD: TONOPEN
OD_IOP_MMHG: 10
OS_IOP_MMHG: 12

## 2017-12-08 ASSESSMENT — SLIT LAMP EXAM - LIDS
COMMENTS: 1+ BLEPHARITIS
COMMENTS: 1+ BLEPHARITIS

## 2017-12-08 ASSESSMENT — CUP TO DISC RATIO
OD_RATIO: 0.25
OS_RATIO: 0.25

## 2017-12-08 ASSESSMENT — EXTERNAL EXAM - RIGHT EYE: OD_EXAM: NORMAL

## 2017-12-08 NOTE — MR AVS SNAPSHOT
After Visit Summary   2017    Francis Flores    MRN: [id number]           Patient Information     Date Of Birth          1965        Visit Information        Provider Department      2017 12:45 PM [facility] Spear OD[facility]         Follow-ups after your visit        Your next 10 appointments already scheduled     May 01, 2018 12:00 PM CDT   (Arrive by 11:45 AM)   RETURN ENDOCRINE with Chantell James MD   [facility] Endocrinology ([facility])    [address] [place] [id number] [phone]              Who to contact     Please call your clinic at [phone] to:    Ask questions about your health    Make or cancel appointments    Discuss your medicines    Learn about your test results    Speak to your doctor   If you have compliments or concerns about an experience at your clinic, or if you wish to file a complaint, please contact [facility] Patient Relations at [phone] or email us at [email or web address]         Additional Information About Your Visit        MyChart Information     [employer] is an electronic gateway that provides easy, online access to your medical records. With [employer], you can request a clinic appointment, read your test results, renew a prescription or communicate with your care team.     To sign up for [employer] visit the website at [email or web address]   You will be asked to enter the access code listed below, as well as some personal information. Please follow the directions to create your username and password.     Your access code is: [email or web address]  Expires: 2018  6:31 AM     Your access code will  in 90 days. If you need help or a new code, please contact your [facility] Clinic or call [phone] for assistance.        Care EveryWhere ID     This is your Care EveryWhere ID. This  could be used by other organizations to access your [place] medical records  [id number]         Blood Pressure from Last 3 Encounters:   12/05/17 132/88   06/10/15 111/64   04/08/15 120/77    Weight from Last 3 Encounters:   12/05/17 84.7 kg (186 lb 12.8 oz)   06/10/15 80.7 kg (177 lb 14.4 oz)   04/08/15 80.2 kg (176 lb 14.4 oz)              Today, you had the following     No orders found for display       Primary Care Provider Office Phone # Fax #    Eliu Dewey [phone]       [facility] [place] [address]   [place] [address]        Equal Access to Services     ELYSSA MULLER : Hadii caleb morgano Socourtney, waaxda luqadaha, qaybta kaalmada adeegyada, holly gonzalez. So [facility] [phone].    ATENCIÓN: Si habla español, tiene a nunez disposición servicios gratuitos de asistencia lingüística. Llame al [phone].    We comply with applicable federal civil rights laws and [place] laws. We do not discriminate on the basis of race, color, national origin, age, disability, sex, sexual orientation, or gender identity.            Thank you!     Thank you for choosing [facility] OPHTHALMOLOGY  for your care. Our goal is always to provide you with excellent care. Hearing back from our patients is one way we can continue to improve our services. Please take a few minutes to complete the written survey that you may receive in the mail after your visit with us. Thank you!             Your Updated Medication List - Protect others around you: Learn how to safely use, store and throw away your medicines at www.disposemymeds.org.          This list is accurate as of: 12/8/17  3:28 PM.  Always use your most recent med list.                   Brand Name Dispense Instructions for use Diagnosis    ibuprofen 200 MG capsule     60 capsule    Take 400 mg by mouth every 4 hours as needed for fever    Pituitary adenoma (H)       pantoprazole sodium 40 MG packet    PROTONIX     Take 1  packet by mouth daily.        ranitidine 150 MG tablet    ZANTAC     Take 1 tablet by mouth 2 times daily.

## 2017-12-08 NOTE — NURSING NOTE
Chief Complaints and History of Present Illnesses   Patient presents with     Annual Eye Exam     HPI    Affected eye(s):  Both   Symptoms:     Blurred vision      Frequency:  Constant       Do you have eye pain now?:  Yes   Location:  OU   Pain Level:  Severe Pain (7)   Pain Frequency:  Constant      Comments:  Patient states she is having eye pain and vision blurry, both eyes. Left eye is worse. This has been ongoing for a while and slowly getting worse. Using a drop BID OU and another QID OU but don't seem to help.    Annmarie Chavez COT 1:02 PM December 8, 2017

## 2017-12-08 NOTE — PROGRESS NOTES
Assessment/Plan  (D35.2) Pituitary adenoma (H)  (primary encounter diagnosis)  Comment: General constriction on field, but no obvious bitemporal defect.   Last MRI: 11/8/2017  Plan: Glaucoma Top OU         Continue management with neurologist in [place]. (Monitored by doctors in [place] and the [facility], reporting adenoma is small). Constricted field is stable to visit in 2015, but progressed from 2010 visit. Given generalized constriction, referred to Dr. Villarreal for neuro-ophthalmic consultation.    (H52.03) Hypermetropia of both eyes  Comment: Hyperopia with presbyopia  Plan: REFRACTION [12864]         Educated patient on condition and clinical findings. Dispensed spectacle prescription for full time wear. Educated patient on possibility of adaptation period, if symptoms do not improve return to clinic for further testing.    (H02.89) Meibomian gland dysfunction (MGD)  Comment: Symptomatic  Plan:  Recommended warm compresses twice each day for ten minutes. Monitor annually, or sooner if symptoms worsen.    (H25.13) Nuclear senile cataract of both eyes  Comment: Not visually significant  Plan:  Monitor annually.    Return to clinic in 1 year for comprehensive eye exam.    Complete documentation of historical and exam elements from today's encounter can  be found in the full encounter summary report (not reduplicated in this progress  note). I personally obtained the chief complaint(s) and history of present illness. I  confirmed and edited as necessary the review of systems, past medical/surgical  history, family history, social history, and examination findings as documented by  others; and I examined the patient myself. I personally reviewed the relevant tests,  images, and reports as documented above. I formulated and edited as necessary the  assessment and plan and discussed the findings and management plan with the  patient and family.    Alberto Spear, OD, FAAO

## 2017-12-11 ENCOUNTER — TELEPHONE (OUTPATIENT)
Dept: OPHTHALMOLOGY | Facility: CLINIC | Age: 52
End: 2017-12-11

## 2017-12-11 NOTE — TELEPHONE ENCOUNTER
Called with assistance of phone , no answer and unable to leave message.    Tiara Liao 12/11/17 8:36 AM

## 2017-12-11 NOTE — TELEPHONE ENCOUNTER
----- Message from Alberto Spear OD sent at 12/11/2017  7:55 AM CST -----  Could you call this patient and set her up with Dr. Villarreal for a consult?  After looking at her results more, I think it would be nowak for her to follow with him.  Thanks,  If she has questions let me know.  ----- Message -----     From: Eliu Villarreal MD     Sent: 12/10/2017   9:57 PM       To: Alberot Spear OD    Yes, I would be happy to.      Thanks,    m    ----- Message -----     From: Alberto Spear OD     Sent: 12/9/2017  10:57 AM       To: Eliu Villarreal MD    I saw this patient on Friday for a comprehensive eye exam. She is followed by neurology in [place], [place], but saw you in 2010 due to a pituitary adenoma. You noted no bitemporal field defect.  She saw Dr. Shaw 2 years ago, who also noted no bitemporal defect, but the fields were much more constricted.  The field results from my visit also showed this dense constriction.  Would you like to see her again?  I have no explanation for this generalized constriction of the field, other than a potentially poor field-taker.  Thanks,  Carloz

## 2017-12-28 NOTE — TELEPHONE ENCOUNTER
Left Message for patient to schedule with Dr Villarreal per Dr Spear, no answer, left scheduling number.    Tiara Liao 12/28/17 10:58 AM

## 2018-05-01 ENCOUNTER — OFFICE VISIT (OUTPATIENT)
Dept: ENDOCRINOLOGY | Facility: CLINIC | Age: 53
End: 2018-05-01
Payer: COMMERCIAL

## 2018-05-01 VITALS
SYSTOLIC BLOOD PRESSURE: 118 MMHG | BODY MASS INDEX: 30.71 KG/M2 | DIASTOLIC BLOOD PRESSURE: 76 MMHG | HEIGHT: 64 IN | HEART RATE: 63 BPM | WEIGHT: 179.9 LBS

## 2018-05-01 DIAGNOSIS — D35.2 PITUITARY MACROADENOMA (H): Primary | ICD-10-CM

## 2018-05-01 DIAGNOSIS — E24.9 CUSHING'S SYNDROME (H): ICD-10-CM

## 2018-05-01 DIAGNOSIS — D35.2 PITUITARY MACROADENOMA (H): ICD-10-CM

## 2018-05-01 LAB — CORTIS SERPL-MCNC: 11.9 UG/DL (ref 4–22)

## 2018-05-01 RX ORDER — DEXAMETHASONE 1 MG
1 TABLET ORAL ONCE
Qty: 1 TABLET | Refills: 0 | Status: SHIPPED | OUTPATIENT
Start: 2018-05-01 | End: 2019-03-06

## 2018-05-01 RX ORDER — CALCIUM CARBONATE 500(1250)
1 TABLET ORAL 2 TIMES DAILY
COMMUNITY

## 2018-05-01 ASSESSMENT — PAIN SCALES - GENERAL: PAINLEVEL: NO PAIN (0)

## 2018-05-01 NOTE — MR AVS SNAPSHOT
After Visit Summary   5/1/2018    Francis Flores    MRN: [id number]           Patient Information     Date Of Birth          1965        Visit Information        Provider Department      5/1/2018 11:45 AM Jo Duarte M Luiza Avancini, MD [facility]        Today's Diagnoses     Pituitary macroadenoma (H)    -  1    Cushing's syndrome (H)           Follow-ups after your visit        Follow-up notes from your care team     Return in about 3 months (around 8/1/2018).      Your next 10 appointments already scheduled     May 03, 2018  8:00 AM CDT   LAB with  LAB   [facility] ([facility])    [address] [place] [id number] [phone]           Please do not eat 10-12 hours before your appointment if you are coming in fasting for labs on lipids, cholesterol, or glucose (sugar). This does not apply to pregnant women. Water, hot tea and black coffee (with nothing added) are okay. Do not drink other fluids, diet soda or chew gum.            Sep 04, 2018 11:30 AM CDT   (Arrive by 11:15 AM)   RETURN ENDOCRINE with SHAYY James MD   [facility] ([facility])    [address] [place] [id number] [phone]              Future tests that were ordered for you today     Open Future Orders        Priority Expected Expires Ordered    Cortisol Routine 5/5/2018 7/1/2018 5/1/2018    Cortisol Routine  5/1/2019 5/1/2018    Adrenal corticotropin Routine  5/1/2019 5/1/2018            Who to contact     Please call your clinic at [phone] to:    Ask questions about your health    Make or cancel appointments    Discuss your medicines    Learn about your test results    Speak to your doctor            Additional Information About Your Visit        MyChart Information     "[employer] is an electronic gateway that provides easy, online access to your medical records.  "With [email or web address], you can request a clinic appointment, read your test results, renew a prescription or communicate with your care team.     To sign up for [employer] visit the website at [email or web address]   You will be asked to enter the access code listed below, as well as some personal information. Please follow the directions to create your username and password.     Your access code is: [email or web address]  Expires: 2018  6:30 AM     Your access code will  in 90 days. If you need help or a new code, please contact your [facility] or call [phone] for assistance.        Care EveryWhere ID     This is your Care EveryWhere ID. This could be used by other organizations to access your [place] medical records  [id number]        Your Vitals Were     Pulse Height BMI (Body Mass Index)             63 1.626 m (5' 4.02\") 30.86 kg/m2          Blood Pressure from Last 3 Encounters:   18 118/76   17 132/88   06/10/15 111/64    Weight from Last 3 Encounters:   18 81.6 kg (179 lb 14.4 oz)   17 84.7 kg (186 lb 12.8 oz)   06/10/15 80.7 kg (177 lb 14.4 oz)                 Today's Medication Changes          These changes are accurate as of 18 12:39 PM.  If you have any questions, ask your nurse or doctor.               Start taking these medicines.        Dose/Directions    dexamethasone 1 MG tablet   Commonly known as:  DECADRON   Used for:  Pituitary macroadenoma (H), Cushing's syndrome (H)   Started by:  SHAYY James MD        Dose:  1 mg   Take 1 tablet (1 mg) by mouth once for 1 dose Take at night (midnight) and draw blood for cortisol 8 hours later (at 8 AM)   Quantity:  1 tablet   Refills:  0            Where to get your medicines      These medications were sent to "[facility]" [address] - [place], [place] - [address]  [address] [facility] [address] [facility] [address]     Phone:  [phone]     dexamethasone 1 MG tablet                Primary " Care Provider Office Phone # Fax #    Eliu Dewey [phone]       [facility] [address]   [facility] [address]        Equal Access to Services     ELYSSA MULLER : Swati blanchard cathyo Frida, waaxda luqadaha, qaybta kaalmada dana, holly phillips narcisojerri blake og gonzalez. So [facility] [place] [phone].    ATENCIÓN: Si habla español, tiene a nunez disposición servicios gratuitos de asistencia lingüística. Llame al [phone].    We comply with applicable federal civil rights laws and [place] laws. We do not discriminate on the basis of race, color, national origin, age, disability, sex, sexual orientation, or gender identity.            Thank you!     Thank you for choosing [facility]  for your care. Our goal is always to provide you with excellent care. Hearing back from our patients is one way we can continue to improve our services. Please take a few minutes to complete the written survey that you may receive in the mail after your visit with us. Thank you!             Your Updated Medication List - Protect others around you: Learn how to safely use, store and throw away your medicines at www.disposemymeds.org.          This list is accurate as of 5/1/18 12:39 PM.  Always use your most recent med list.                   Brand Name Dispense Instructions for use Diagnosis    calcium carbonate 500 tablet    OS- mg [place]. Ca     Take 1 tablet by mouth 2 times daily        dexamethasone 1 MG tablet    DECADRON    1 tablet    Take 1 tablet (1 mg) by mouth once for 1 dose Take at night (midnight) and draw blood for cortisol 8 hours later (at 8 AM)    Pituitary macroadenoma (H), Cushing's syndrome (H)       ibuprofen 200 MG capsule     60 capsule    Take 400 mg by mouth every 4 hours as needed for fever    Pituitary adenoma (H)       pantoprazole sodium 40 MG packet    PROTONIX     Take 1 packet by mouth daily.        ranitidine 150 MG tablet    ZANTAC     Take 1 tablet by  mouth 2 times daily.        VITAMIN D (CHOLECALCIFEROL) PO      Take 2,000 Units by mouth daily

## 2018-05-01 NOTE — NURSING NOTE
"Chief Complaint   Patient presents with     RECHECK     PITUITARY ADENOMA       Initial /76  Pulse 63  Ht 1.626 m (5' 4.02\")  Wt 81.6 kg (179 lb 14.4 oz)  BMI 30.86 kg/m2 Estimated body mass index is 30.86 kg/(m^2) as calculated from the following:    Height as of this encounter: 1.626 m (5' 4.02\").    Weight as of this encounter: 81.6 kg (179 lb 14.4 oz).  Medication Reconciliation: complete    "

## 2018-05-01 NOTE — PROGRESS NOTES
Endocrinology and [facility]patient [facility]    CC: F/u visit for pituitary macroadenoma.     HPI: Mrs. Flores is a 53 year old [place] woman that is seen today for follow-up of a pituitary macroadenoma. The interview was facilitated by an . The patient was last seen in the endocrine clinic in 6/2015.   Briefly, Mrs. Flores  had a pituitary macroadenoma diagnosed in 2008. At the time, Mrs. Flores was complaining of headaches. She had a MRI done which revealed a sellar mass, with no evidence of hormone excess or deficiency at that time. On 7/08, cortisol was 19.3, prolactin 8, TSH 1.3, FT4 0.76 (replaced with Levothyroxine for couple of months and then discontinued), LH 35, FSH 58, and normal alpha subunit. On 4/09, cortisol 14.6, TSH 2.84, FT4 1.06, prolactin 6, IGF-1 111. She was seen by Dr. Omer in our clinic initially in 2/2010, and repeated hormonal evaluation was essentially unchanged. She was seen by neurophthalmology in 2/2010, and was found to have no visual field defects.     When she was seen in the endocrine clinic in 2015, it was found that her tumor had been stable in size. There were some concern for Cushing's disease based on a positive dexamethasone suppression test on 12/14/13 but a second test on 1/17/12 was normal. Subsequent tests showed increased ACTH and increased free urinary cortisol.  A dexamethasone tests was ordered however it is unclear whether or not the patient did the test.  At some point she reported she had it done in [place], taking the dexamethasone at 11 PM on a Sunday, and having blood work done at 7:30 AM the next morning. I never received the results of these tests and was not able to locate them.    Ms. Flores continues to complain to report headache.  Headache has been noted since 2008, but the frequency and intensity of the headache episodes have varied over the years. Tylenol offers good relief.  Headaches are pressure-like, specially in the temporal and  occipital areas, some as prior. Mrs. Flores have a history of nausea and some dizziness that started at about the same time as the headaches, but this has now improved.  She also reports her eyesight is getting worse, with no evident field defect. The patient denies galactorrhea or breast symptoms. Last menstrual period was about 16 years ago, after having her last baby, which she breast fed for 3 years. Menarche was at age of 13 with regular periods afterwards. She now reports she has been gaining some weight. She denies polyuria or polydipsia.     The patient was seen at [facility] not long ago, and reports having multiple tests done there.  I do not have access to these records and will try to obtain information from [facility] while the patient is  in clinic.  Previous MRI (2015 showed that the pituitary macroadenoma was stable in size (see below).    Mrs. Flores had a right leg (knee) fracture in 11/2008. She was found to have mild vitamin D deficiency, with 25-OH vitamin D levels of 24, and a PTH of 115. She was placed on vitamin D2 50,000 units per week for 12 weeks, followed by maintenance with vitamin D3 2,000 units daily.  Vitamin D is no longer on her medication list and she is not sure whether or not she is taking it regularly.    She was diagnosed with hypertension and is on hydrochlorothiazide 12.5 mg/day. Mrs. Flores denies other health issues.     The tumor size was reported as follows:   7/08 1.4 x 2.1 x 1.3 cm   5/09 1.5 x 2.3 x 1.3 cm   12/09 1.5 x 2.5 x 1.6 cm   6/10 1.3 x 2.3 x 1.3 cm   11/11 2.0 x 1.2 cm  1/14 1.8 x 0.9 cm  4/15 1.8 x 1.2 x 1.9 cm     ROS:  11 point ROS as detailed in the HPI, below, or negative  Constitutional: no fevers, chills, fatigue, unintentional weight gain/loss, or change in appetite   HEENT: Endorses headaches and no vision changes; no diplopia  CV: no chest pain or palpitations.   RESP: no dyspnea, cough, SOB.   GI: no vomiting.    Endo: no polyuria, polydipsia,  "temperature intolerance or sleep problems     Medications  Current Outpatient Prescriptions   Medication     calcium carbonate (OS- MG [place]. [place]) 500 tablet     ibuprofen 200 MG capsule     pantoprazole sodium 40 MG tablet     ranitidine (ZANTAC) 150 MG tablet     VITAMIN D, CHOLECALCIFEROL, PO     No current facility-administered medications for this visit.      Family Hx   No Family Hx of pituitary disease, asthma or osteoporosis.   Has 4 healthy children, 24 yrs old girl, 18 yrs old twin boys and 16 yrs old boy.     Social Hx   Behavioral history: No tobacco use.   Home environment: No secondhand tobacco smoke in home.   Mrs. Flores does not work.   She lives with her  and their 4 children. Mrs. Flores lives in [place].  She does not smoke or consume alcohol.     PMH   Illnesses:   Non functioning (?) pituitary tumor as per HPI   Premature ovarian failure   Bone fracture   Surgeries:   Right knee (11/2008)     Menstrual History:   Menarche at age 13   Cycles were regular   Gestae 8 Para 4 (one gemellar pregnancy)   Menopause 14 yrs ago.     Physical Exam:  /76  Pulse 63  Ht 1.626 m (5' 4.02\")  Wt 81.6 kg (179 lb 14.4 oz)  BMI 30.86 kg/m2  Body mass index is 30.86 kg/(m^2).  General : Alert, oriented X4, no acute distress, no pallor, jaundice or cyanosis.   HEENT : NCAT, EOIM, no scleral icterus or proptosis. Gross evaluation of the visual fields by confrontation is normal/small defect on the upper lateral gaze    thyroid/lymph: no LN enlargement, thyroid is normal in size   Lungs : rhonchi scattered throughout, normal excursion   CV: Normal S1, Normal S2, No Murmurs   Abdomen : soft and lax, non tender, no hepatosplenomegaly, no ascites, bowel sounds are present.   Neuro : CN II-XII intact; tone, power and reflexes are normal and symmetrical in upper and lower extremities, no tremor of outstretched hands   Extremities : pulses are ++ in both limbs in upper and lower extremities, no ulcers, " no edema.   skin: normal temperature, turgor, texture and thickness; no rashes on exposed skin. No hyperpigmentation of palmar creases   Psychological: appropriate mood and affect    Imaging Results   MRI BRAIN 16 Jun 2010 - MRI BRAIN W AND WO CONTRAST*   Findings: A sellar mass is noted measuring 1.3 x 2.3 x 1.3 cm (AP by transverse by craniocaudal, not significantly changed from remeasurement of comparison MRI 12/16/2009. The heterogeneously enhancing mass expands the sella with iso to hyperintensity on T1-weighted images and mild hyperintensity on T2-weighted images. The pituitary stalk is slightly deviated to the right, unchanged. There is no displacement of the optic chiasm. The carotid flow-voids are patent.   Trace periventricular T2 hyperintensity is again noted with a small focal area of increased T2 signal adjacent to the lateral horn of the left lateral ventricles similar to outside MRI 5/12/2009.   Impression: No change in size of sellar mass lesion compatible with a pituitary macroadenoma.     DEXA HIP PELVIS SPINE 12 May 2010   Presuming that the patient is not postmenopausal, the lowest and valid Z-score of -4.8 at the level of the lumbar spine is BELOW the expected range for age. (see Ref. #3) According to the ISCD position statements at www.iscd.org, a Z-score more negative than -2.0 is below expected range for age. Note the wide range in BMD values and T- scores within the lumbar   spine (L2 at -3.5, L3 at -2.3). This pattern suggests degenerative joint disease or occult fractures at the lumbar level that would limit the detection of low bone density in the L1 - L4 spine region. The lumbar spine is therefore represented by L1-L2. Note the very negative Z- score within the lumbar spine.This suggests that secondary causes of low bone density might be present. Repeat DXA testing in 1-2 years could be considered. Clinical correlation recommended.     CHEST TWO VIEW* 04 Feb 2010 1  Findings: Haziness is  seen within the lower lungs which likely represents summation shadow. No definite airspace opacities are seen. Cardiac silhouette and pulmonary vasculature are within normal limits. There is no pneumothorax.   Impression: No acute airspace opacity.     Brain/Pituitary MRI without and with contrast  Findings:   1.8 x 0.9 cm hypoenhancing left sellar mass (series 13, image 10), previously 2.2 x 1.2 cm on 11/30/2011 and 2.5 x 1.3 cm on 11/8/2010. On coronal images, the mass now demonstrates a concave superior margin, previously convex on 11/8/2010 and intermediate 11/30/2011. The mass continues to invade the left cavernous sinus with enhancing  soft tissue encasing the cavernous portion of the left carotid artery and displacing the right internal carotid artery. Carotid flow-voids are patent. There is erosion of the dorsum sella. The pituitary sac is deviated to the right. There is preserved CSF space between the mass and the optic chiasm and optic nerves. The ventricles are not enlarged  out of proportion to the surgical sulci. Diffusion-weighted images demonstrate no evidence of acute infarct. No intracranial hemorrhage, mass effect, midline shift or extra-axial  fluid collection. Minimal nonspecific punctate periventricular white matter T2 FLAIR hyperintensities, unchanged since previous exam. Calvarium and orbits are unremarkable. The paranasal sinuses and mastoid air cells are clear.  IMPRESSION  1. Decrease in size of left sellar mass, which invades the left cavernous sinus and encases the cavernous carotid artery, compatible with known pituitary macroadenoma. The mass does not abut the optic chiasm or nerves. No hydrocephalus.  2. Stable minimal nonspecific periventricular white matter T2 FLAIR hyperintensity, which may represent early onset chronic small vessel ischemic disease or sequelae of prior vasculopathy, Infection/inflammation.    4/17/15  Brain/Pituitary MRI without and with contrast  History: Benign  neoplasm of pituitary gland and craniopharyngeal duct (pouch).   Comparison: MRI dated 1/2/14   Findings: Again noted is a mass within the sella predominantly along the left lateral aspect with extension into the left cavernous sinus. The mass  appears isointense on the T1 and hypointense on the T2-weighted images and demonstrates enhancement. It encases the cavernous segment of the  left ICA. The mass measures 1.8 x 1.2 x 1.9 cm (TR, CC, AP), and is unchanged in size and appearance since prior study. There is no  extension into the suprasellar cistern. No mass effect on the optic chiasm. The pituitary stalk is displaced to the level right as before.  The major cavernous carotid vascular flow-voids appear patent.  No mass effect, midline shift, or significant enlargement of the  ventricles. Minimal T2 hyperintensity are seen in the bilateral periventricular white matter are nonspecific and unchanged since prior  study.  Impression:   1. Stable appearance of mass within the sella predominantly along the left lateral aspect with extension into the left cavernous sinus,  consistent with known pituitary macroadenoma. No mass effect on the optic chiasm. No hydrocephalus.  2. Unchanged minimal nonspecific periventricular white matter T2 hyperintensities likely representing early onset of chronic small  vessel ischemic changes.    Lab results  ENDO PITUITARY LABS-[facility] Latest Ref Rng 1/1/2014 12/3/2013   TSH 0.4 - 5.0 mU/L  1.54   PROLACTIN 3 - 27 ug/L  5   PROLACTIN   4.4   CORTISOL FREE, URINE  69.5 (H)    CORTISOL FREE, URINE RANDOM  45.70    CORTISOL UG/G CRT  47.60    CORTISOL, SERUM 4 - 22 ug/dL  11.9   ADRENAL CORTICOTROPIN 10 - 47 pg/mL  200 (H)   FSH   50.5   LUTROPIN   26.6   ESTRADIOL   30    - 144 mmol/L  143   POTASSIUM 3.4 - 5.3 mmol/L  3.9       ENDO PITUITARY LABS-[facility] Latest Ref Rng 4/12/2012   CORTISOL FREE, URINE  17.3   CORTISOL FREE, URINE RANDOM  10.90   CORTISOL UG/G CRT  21.80     ENDO PITUITARY  LABS-[facility] Latest Ref Rng 2/2/2012   CORTISOL FREE, URINE  29.7   CORTISOL FREE, URINE RANDOM  53.00   CORTISOL UG/G CRT  44.17     ENDO PITUITARY LABS-[facility] Latest Ref Rng 1/17/2012   CORTISOL, SERUM 4 - 22 ug/dL 14.9   ADRENAL CORTICOTROPIN 10 - 47 pg/mL 197 (H)    - 144 mmol/L 143   POTASSIUM 3.4 - 5.3 mmol/L 4.0     ENDO PITUITARY LABS-[facility] Latest Ref Rng 12/14/2011   CORTISOL, SERUM 4 - 22 ug/dL 3.4 (L)   ADRENAL CORTICOTROPIN 10 - 47 pg/mL 72 (H)     ENDO PITUITARY LABS-[facility] Latest Ref Rng 12/9/2011   CORTISOL FREE, URINE  41.2   CORTISOL FREE, URINE RANDOM  24.70   CORTISOL UG/G CRT  34.31     ENDO PITUITARY LABS-[facility] Latest Ref Rng 12/7/2011   TSH 0.4 - 5.0 mU/L 1.10   PROLACTIN 3 - 27 ug/L 6   CORTISOL, SERUM 4 - 22 ug/dL 13.6   ADRENAL CORTICOTROPIN 10 - 47 pg/mL 139 (H)   FSH  43.0   LUTROPIN  22.6   INSULIN GROWTH FACTOR 1 94 - 252 ng/mL 78 (L)   INSULIN GROWTH FACTOR 1 SD SCORE  NEG 2.4   GLUCOSE 60 - 99 mg/dL 82     [facility]2010   CORTISOL FREE, URINE  31.3   CORTISOL FREE, URINE RANDOM  32.20   CORTISOL UG/G CRT  33.89     ENDO PITUITARY LABS[facility] Latest Ref Rng 11/2/2010   TSH 0.4 - 5.0 mU/L 1.44   PROLACTIN 3 - 27 ug/L 6   CORTISOL, SERUM 4 - 22 ug/dL 15.8   ADRENAL CORTICOTROPIN 10 - 47 pg/mL 253 (H)   FSH  45.7   LUTROPIN  28.1   ESTRADIOL  19   URINE OSMOLALITY 100 - 1200 mmol/kg 727   INSULIN GROWTH FACTOR 1 94 - 252 ng/mL 140   INSULIN GROWTH FACTOR 1 SD SCORE  NEG 1.1   GLUCOSE 60 - 99 mg/dL 83     ENDO PITUITARY LABS[facility] Latest Ref Rng 5/6/2010 2/4/2010   TSH 0.4 - 5.0 mU/L 1.62 1.74   PROLACTIN 3 - 27 ug/L  7   CORTISOL, SERUM 4 - 22 ug/dL  14.0   FSH   49.8   LUTROPIN   33.7   ESTRADIOL   11   INSULIN GROWTH FACTOR 1 94 - 252 ng/mL  101   INSULIN GROWTH FACTOR 1 SD SCORE   NEG 2.0   GLUCOSE 60 - 99 mg/dL       ENDO PITUITARY LABS[facility] Latest Ref Rng 4/15/2009   TSH 0.4 - 5.0 mU/L 2.84   PROLACTIN 3 - 27 ug/L 6   CORTISOL, SERUM 4 - 22 ug/dL 14.6    TESTOSTERONE TOTAL 14 - 75 ng/dL 35   INSULIN GROWTH FACTOR 1 94 - 252 ng/mL 111   INSULIN GROWTH FACTOR 1 SD SCORE  NEG 1.8       Assessment and Plan:  Mrs. Flores is a 53 year old woman with a pituitary macroadenoma.   1. Pituitary macroadenoma: Previous evaluation suggested this is a non-functioning lesion that is growing slowly. There was no clinical or biochemical evidence of hyperprolactinemia. LH and FSH levels were appropriately elevated in the setting of primary ovarian failure. Patient had symptoms suggestive of hypothyroidism and was placed on levothyroxine in the past. Repeated TFT after hormone replacement was discontinued were normal. Mrs. Flores has normal IGF-1 levels and has no signs or symptoms of diabetes insipidus. However, I was concern the patient had Cushing's disease. ACTH levels and 24h-urinary cortisol were elevated. I had asked Ms. Flores to do a dexamethasone suppression test when I last saw her, and also had my nursing staff following up with her in that regard, but despite the fact that the pt believes she did it, I cannot find evidence that she did draw blood early AM for cortisol levels (this would presumptively be after dexamethasone on the prior night).  I will wait for the records from [facility], as patient states all was found to be normal, to determine whether or not additional work-up is needed. I will again refer the patient to opthalmology, for a formal campimetry.    2. Vitamin D deficiency: Mrs. Flores has a history of bone fracture and low vitamin D levels, along with increased PTH levels. Patient is on vitamin D replacement.  We will consider repeating her DEXA scan.    Plan was discussed with patient in detail, with the help of an , and she verbalizes understanding.  The patient has previously provided me with her daughter's phone number [phone], [employer], and she gave me authorization to discuss any aspect related to her health with her  daughter.      No orders of the defined types were placed in this encounter.      Maine James MD PhD    Division of Endocrinology and Diabetes      Addendum:  I received a fax with the data from patient's recent evaluation at [facility]. No images were sent.    Results from November 14, 2017, collect at 9:15 AM, show:  CBC: normal; 25-hydroxy vitamin D: Total 20, D3 20, D2 <4  Sodium 141, potassium 4.5, calcium 9.7, glucose at 85, alkaline phosphatase 87, AST 16, creatinine 0.8, albumin 4.0.  TSH 1.7, free T4 0.9, prolactin 7.3, FSH 44.8, IGF-I 90; ACTH 160 (7.2-63); cortisol 11  24 hour urine collection: Urinary volume 1390 ml; Calcium 167 mg per 24 hours, creatinine 917 mg per 24 hours, cortisol 43  g per 24 hours    DEXA scan was done on that same day and showed lowest T-score is -3.5    Brain MRI - November 7, 2017, interpretation reads as follows:   Large pituitary adenoma present in 2009 and 2010 and has sparsely regressed in the 2011 exam and almost completely resolved with in the 2015 and current exam.  Focal defect in the left side of the pituitary gland, where the prior mass was once noted. The normal plan this displaced to the right, with moderate rightward deviation of the pituitary stalk. There remains some hypoenhancing material in the medial aspect of the left cavernous sinus of uncertain significance; the initial studies showed a subtotal encasement of the left cavernous ICA by tumor and that this material has almost completely resolved except for the his small residual hypoenhancing region.  The component of the tumor extending posteriorly over the left Meckel's cave was present on the initial 3 studies, but that this component has also almost completely resolved.  No evidence of new tumor.  The suprasellar cistern is free of mass.  The mild to generalized cerebral and cerebral volume loss is stable since 2009.  Reminder normal.    Based on these results, we will intensify  vitamin D replacement therapy and discuss bisphosphonate use and plan again for a dexamethasone suppression test.  We will try to obtain a CD with the patient's most recent  MRI.    Maine James MD PhD    Division of Endocrinology and Diabetes

## 2018-05-01 NOTE — LETTER
5/1/2018       RE: Francis Flores  [address]   [place] [id number]     Dear Colleague,    Thank you for referring your patient, Francis Flores, to the [facility] ENDOCRINOLOGY at [facility]. Please see a copy of my visit note below.    Endocrinology and Diabetes Outpatient Clinic    CC: F/u visit for pituitary macroadenoma.     HPI: Mrs. Flores is a 53 year old [place] woman that is seen today for follow-up of a pituitary macroadenoma. The interview was facilitated by an . The patient was last seen in the endocrine clinic in 6/2015.   Briefly, Mrs. Flores  had a pituitary macroadenoma diagnosed in 2008. At the time, Mrs. Flores was complaining of headaches. She had a MRI done which revealed a sellar mass, with no evidence of hormone excess or deficiency at that time. On 7/08, cortisol was 19.3, prolactin 8, TSH 1.3, FT4 0.76 (replaced with Levothyroxine for couple of months and then discontinued), LH 35, FSH 58, and normal alpha subunit. On 4/09, cortisol 14.6, TSH 2.84, FT4 1.06, prolactin 6, IGF-1 111. She was seen by Dr. Omer in our clinic initially in 2/2010, and repeated hormonal evaluation was essentially unchanged. She was seen by neurophthalmology in 2/2010, and was found to have no visual field defects.     When she was seen in the endocrine clinic in 2015, it was found that her tumor had been stable in size. There were some concern for Cushing's disease based on a positive dexamethasone suppression test on 12/14/13 but a second test on 1/17/12 was normal. Subsequent tests showed increased ACTH and increased free urinary cortisol.  A dexamethasone tests was ordered however it is unclear whether or not the patient did the test.  At some point she reported she had it done in [place], taking the dexamethasone at 11 PM on a Sunday, and having blood work done at 7:30 AM the next morning. I never received the results of these tests and was  not able to locate them.    Ms. Flores continues to complain to report headache.  Headache has been noted since 2008, but the frequency and intensity of the headache episodes have varied over the years. Tylenol offers good relief.  Headaches are pressure-like, specially in the temporal and occipital areas, some as prior. Mrs. Flores have a history of nausea and some dizziness that started at about the same time as the headaches, but this has now improved.  She also reports her eyesight is getting worse, with no evident field defect. The patient denies galactorrhea or breast symptoms. Last menstrual period was about 16 years ago, after having her last baby, which she breast fed for 3 years. Menarche was at age of 13 with regular periods afterwards. She now reports she has been gaining some weight. She denies polyuria or polydipsia.     The patient was seen at [facility] not long ago, and reports having multiple tests done there.  I do not have access to these records and will try to obtain information from [facility] while the patient is  in clinic.  Previous MRI (2015 showed that the pituitary macroadenoma was stable in size (see below).    Mrs. Flores had a right leg (knee) fracture in 11/2008. She was found to have mild vitamin D deficiency, with 25-OH vitamin D levels of 24, and a PTH of 115. She was placed on vitamin D2 50,000 units per week for 12 weeks, followed by maintenance with vitamin D3 2,000 units daily.  Vitamin D is no longer on her medication list and she is not sure whether or not she is taking it regularly.    She was diagnosed with hypertension and is on hydrochlorothiazide 12.5 mg/day. Mrs. Flores denies other health issues.     The tumor size was reported as follows:   7/08 1.4 x 2.1 x 1.3 cm   5/09 1.5 x 2.3 x 1.3 cm   12/09 1.5 x 2.5 x 1.6 cm   6/10 1.3 x 2.3 x 1.3 cm   11/11 2.0 x 1.2 cm  1/14 1.8 x 0.9 cm  4/15 1.8 x 1.2 x 1.9 cm     ROS:  11 point ROS as detailed in the HPI, below, or  "negative  Constitutional: no fevers, chills, fatigue, unintentional weight gain/loss, or change in appetite   HEENT: Endorses headaches and no vision changes; no diplopia  CV: no chest pain or palpitations.   RESP: no dyspnea, cough, SOB.   GI: no vomiting.    Endo: no polyuria, polydipsia, temperature intolerance or sleep problems     Medications  Current Outpatient Prescriptions   Medication     calcium carbonate (OS- MG [place]. CA) 500 tablet     ibuprofen 200 MG capsule     pantoprazole sodium 40 MG tablet     ranitidine (ZANTAC) 150 MG tablet     VITAMIN D, CHOLECALCIFEROL, PO     No current facility-administered medications for this visit.      Family Hx   No Family Hx of pituitary disease, asthma or osteoporosis.   Has 4 healthy children, 24 yrs old girl, 18 yrs old twin boys and 16 yrs old boy.     Social Hx   Behavioral history: No tobacco use.   Home environment: No secondhand tobacco smoke in home.   Mrs. Flores does not work.   She lives with her  and their 4 children. Mrs. Flores lives in [place].  She does not smoke or consume alcohol.     PMH   Illnesses:   Non functioning (?) pituitary tumor as per HPI   Premature ovarian failure   Bone fracture   Surgeries:   Right knee (11/2008)     Menstrual History:   Menarche at age 13   Cycles were regular   Gestae 8 Para 4 (one gemellar pregnancy)   Menopause 14 yrs ago.     Physical Exam:  /76  Pulse 63  Ht 1.626 m (5' 4.02\")  Wt 81.6 kg (179 lb 14.4 oz)  BMI 30.86 kg/m2  Body mass index is 30.86 kg/(m^2).  General : Alert, oriented X4, no acute distress, no pallor, jaundice or cyanosis.   HEENT : NCAT, EOIM, no scleral icterus or proptosis. Gross evaluation of the visual fields by confrontation is normal/small defect on the upper lateral gaze    thyroid/lymph: no LN enlargement, thyroid is normal in size   Lungs : rhonchi scattered throughout, normal excursion   CV: Normal S1, Normal S2, No Murmurs   Abdomen : soft and lax, non tender, " no hepatosplenomegaly, no ascites, bowel sounds are present.   Neuro : CN II-XII intact; tone, power and reflexes are normal and symmetrical in upper and lower extremities, no tremor of outstretched hands   Extremities : pulses are ++ in both limbs in upper and lower extremities, no ulcers, no edema.   skin: normal temperature, turgor, texture and thickness; no rashes on exposed skin. No hyperpigmentation of palmar creases   Psychological: appropriate mood and affect    Imaging Results   MRI BRAIN 16 Jun 2010 - MRI BRAIN W AND WO CONTRAST*   Findings: A sellar mass is noted measuring 1.3 x 2.3 x 1.3 cm (AP by transverse by craniocaudal, not significantly changed from remeasurement of comparison MRI 12/16/2009. The heterogeneously enhancing mass expands the sella with iso to hyperintensity on T1-weighted images and mild hyperintensity on T2-weighted images. The pituitary stalk is slightly deviated to the right, unchanged. There is no displacement of the optic chiasm. The carotid flow-voids are patent.   Trace periventricular T2 hyperintensity is again noted with a small focal area of increased T2 signal adjacent to the lateral horn of the left lateral ventricles similar to outside MRI 5/12/2009.   Impression: No change in size of sellar mass lesion compatible with a pituitary macroadenoma.     DEXA HIP PELVIS SPINE 12 May 2010   Presuming that the patient is not postmenopausal, the lowest and valid Z-score of -4.8 at the level of the lumbar spine is BELOW the expected range for age. (see Ref. #3) According to the ISCD position statements at www.iscd.org, a Z-score more negative than -2.0 is below expected range for age. Note the wide range in BMD values and T- scores within the lumbar   spine (L2 at -3.5, L3 at -2.3). This pattern suggests degenerative joint disease or occult fractures at the lumbar level that would limit the detection of low bone density in the L1 - L4 spine region. The lumbar spine is therefore  represented by L1-L2. Note the very negative Z- score within the lumbar spine.This suggests that secondary causes of low bone density might be present. Repeat DXA testing in 1-2 years could be considered. Clinical correlation recommended.     CHEST TWO VIEW* 04 Feb 2010 1  Findings: Haziness is seen within the lower lungs which likely represents summation shadow. No definite airspace opacities are seen. Cardiac silhouette and pulmonary vasculature are within normal limits. There is no pneumothorax.   Impression: No acute airspace opacity.     Brain/Pituitary MRI without and with contrast  Findings:   1.8 x 0.9 cm hypoenhancing left sellar mass (series 13, image 10), previously 2.2 x 1.2 cm on 11/30/2011 and 2.5 x 1.3 cm on 11/8/2010. On coronal images, the mass now demonstrates a concave superior margin, previously convex on 11/8/2010 and intermediate 11/30/2011. The mass continues to invade the left cavernous sinus with enhancing  soft tissue encasing the cavernous portion of the left carotid artery and displacing the right internal carotid artery. Carotid flow-voids are patent. There is erosion of the dorsum sella. The pituitary sac is deviated to the right. There is preserved CSF space between the mass and the optic chiasm and optic nerves. The ventricles are not enlarged  out of proportion to the surgical sulci. Diffusion-weighted images demonstrate no evidence of acute infarct. No intracranial hemorrhage, mass effect, midline shift or extra-axial  fluid collection. Minimal nonspecific punctate periventricular white matter T2 FLAIR hyperintensities, unchanged since previous exam. Calvarium and orbits are unremarkable. The paranasal sinuses and mastoid air cells are clear.  IMPRESSION  1. Decrease in size of left sellar mass, which invades the left cavernous sinus and encases the cavernous carotid artery, compatible with known pituitary macroadenoma. The mass does not abut the optic chiasm or nerves. No  hydrocephalus.  2. Stable minimal nonspecific periventricular white matter T2 FLAIR hyperintensity, which may represent early onset chronic small vessel ischemic disease or sequelae of prior vasculopathy, Infection/inflammation.    4/17/15  Brain/Pituitary MRI without and with contrast  History: Benign neoplasm of pituitary gland and craniopharyngeal duct (pouch).   Comparison: MRI dated 1/2/14   Findings: Again noted is a mass within the sella predominantly along the left lateral aspect with extension into the left cavernous sinus. The mass  appears isointense on the T1 and hypointense on the T2-weighted images and demonstrates enhancement. It encases the cavernous segment of the  left ICA. The mass measures 1.8 x 1.2 x 1.9 cm (TR, CC, AP), and is unchanged in size and appearance since prior study. There is no  extension into the suprasellar cistern. No mass effect on the optic chiasm. The pituitary stalk is displaced to the level right as before.  The major cavernous carotid vascular flow-voids appear patent.  No mass effect, midline shift, or significant enlargement of the  ventricles. Minimal T2 hyperintensity are seen in the bilateral periventricular white matter are nonspecific and unchanged since prior  study.  Impression:   1. Stable appearance of mass within the sella predominantly along the left lateral aspect with extension into the left cavernous sinus,  consistent with known pituitary macroadenoma. No mass effect on the optic chiasm. No hydrocephalus.  2. Unchanged minimal nonspecific periventricular white matter T2 hyperintensities likely representing early onset of chronic small  vessel ischemic changes.    Lab results  ENDO PITUITARY LABS-[facility] Latest Ref Rng 1/1/2014 12/3/2013   TSH 0.4 - 5.0 mU/L  1.54   PROLACTIN 3 - 27 ug/L  5   PROLACTIN   4.4   CORTISOL FREE, URINE  69.5 (H)    CORTISOL FREE, URINE RANDOM  45.70    CORTISOL UG/G CRT  47.60    CORTISOL, SERUM 4 - 22 ug/dL  11.9   ADRENAL  CORTICOTROPIN 10 - 47 pg/mL  200 (H)   FSH   50.5   LUTROPIN   26.6   ESTRADIOL   30    - 144 mmol/L  143   POTASSIUM 3.4 - 5.3 mmol/L  3.9       ENDO PITUITARY LABS-[facility] Latest Ref Rng 4/12/2012   CORTISOL FREE, URINE  17.3   CORTISOL FREE, URINE RANDOM  10.90   CORTISOL UG/G CRT  21.80     [facility]   CORTISOL FREE, URINE  29.7   CORTISOL FREE, URINE RANDOM  53.00   CORTISOL UG/G CRT  44.17     [facility] Latest Ref Rng 1/17/2012   CORTISOL, SERUM 4 - 22 ug/dL 14.9   ADRENAL CORTICOTROPIN 10 - 47 pg/mL 197 (H)    - 144 mmol/L 143   POTASSIUM 3.4 - 5.3 mmol/L 4.0     [facility] Latest Ref Rng 12/14/2011   CORTISOL, SERUM 4 - 22 ug/dL 3.4 (L)   ADRENAL CORTICOTROPIN 10 - 47 pg/mL 72 (H)     [facility] Lat[facility] Ref Rng 12/9/2011   CORTISOL FREE, URINE  41.2   CORTISOL FREE, URINE RANDOM  24.70   CORTISOL UG/G CRT  34.31     [facility] Lat[facility] Ref Rng 12/7/2011   TSH 0.4 - 5.0 mU/L 1.10   PROLACTIN 3 - 27 ug/L 6   CORTISOL, SERUM 4 - 22 ug/dL 13.6   ADRENAL CORTICOTROPIN 10 - 47 pg/mL 139 (H)   FSH  43.0   LUTROPIN  22.6   INSULIN GROWTH FACTOR 1 94 - 252 ng/mL 78 (L)   INSULIN GROWTH FACTOR 1 SD SCORE  NEG 2.4   GLUCOSE 60 - 99 mg/dL 82     [facility] Latest Ref Rng 11/8/2010   CORTISOL FREE, URINE  31.3   CORTISOL FREE, URINE RANDOM  32.20   CORTISOL UG/G CRT  33.89     [facility] Lat[facility] Ref Rng 11/2/2010   TSH 0.4 - 5.0 mU/L 1.44   PROLACTIN 3 - 27 ug/L 6   CORTISOL, SERUM 4 - 22 ug/dL 15.8   ADRENAL CORTICOTROPIN 10 - 47 pg/mL 253 (H)   FSH  45.7   LUTROPIN  28.1   ESTRADIOL  19   URINE OSMOLALITY 100 - 1200 mmol/kg 727   INSULIN GROWTH FACTOR 1 94 - 252 ng/mL 140   INSULIN GROWTH FACTOR 1 SD SCORE  NEG 1.1   GLUCOSE 60 - 99 mg/dL 83     ENDO PITUITARY LABS-[facility] Latest Ref Rng 5/6/2010 2/4/2010   TSH 0.4 - 5.0 mU/L 1.62 1.74   PROLACTIN 3 - 27 ug/L  7   CORTISOL, SERUM 4 - 22 ug/dL  14.0   FSH    49.8   LUTROPIN   33.7   ESTRADIOL   11   INSULIN GROWTH FACTOR 1 94 - 252 ng/mL  101   INSULIN GROWTH FACTOR 1 SD SCORE   NEG 2.0   GLUCOSE 60 - 99 mg/dL       ENDO PITUITARY LABS-[facility] Latest Ref Rng 4/15/2009   TSH 0.4 - 5.0 mU/L 2.84   PROLACTIN 3 - 27 ug/L 6   CORTISOL, SERUM 4 - 22 ug/dL 14.6   TESTOSTERONE TOTAL 14 - 75 ng/dL 35   INSULIN GROWTH FACTOR 1 94 - 252 ng/mL 111   INSULIN GROWTH FACTOR 1 SD SCORE  NEG 1.8       Assessment and Plan:  Mrs. Flores is a 53 year old woman with a pituitary macroadenoma.   1. Pituitary macroadenoma: Previous evaluation suggested this is a non-functioning lesion that is growing slowly. There was no clinical or biochemical evidence of hyperprolactinemia. LH and FSH levels were appropriately elevated in the setting of primary ovarian failure. Patient had symptoms suggestive of hypothyroidism and was placed on levothyroxine in the past. Repeated TFT after hormone replacement was discontinued were normal. Mrs. Flores has normal IGF-1 levels and has no signs or symptoms of diabetes insipidus. However, I was concern the patient had Cushing's disease. ACTH levels and 24h-urinary cortisol were elevated. I had asked Ms. Flores to do a dexamethasone suppression test when I last saw her, and also had my nursing staff following up with her in that regard, but despite the fact that the pt believes she did it, I cannot find evidence that she did draw blood early AM for cortisol levels (this would presumptively be after dexamethasone on the prior night).  I will wait for the records from [facility], as patient states all was found to be normal, to determine whether or not additional work-up is needed. I will again refer the patient to opthalmology, for a formal campimetry.    2. Vitamin D deficiency: Mrs. Flores has a history of bone fracture and low vitamin D levels, along with increased PTH levels. Patient is on vitamin D replacement.  We will consider repeating her DEXA scan.    Plan  was discussed with patient in detail, with the help of an , and she verbalizes understanding.  The patient has previously provided me with her daughter's phone number [phone], [place], and she gave me authorization to discuss any aspect related to her health with her daughter.      No orders of the defined types were placed in this encounter.      Maine James MD PhD    Division of Endocrinology and Diabetes      Addendum:  I received a fax with the data from patient's recent evaluation at [facility]. No images were sent.    Results from November 14, 2017, collect at 9:15 AM, show:  CBC: normal; 25-hydroxy vitamin D: Total 20, D3 20, D2 <4  Sodium 141, potassium 4.5, calcium 9.7, glucose at 85, alkaline phosphatase 87, AST 16, creatinine 0.8, albumin 4.0.  TSH 1.7, free T4 0.9, prolactin 7.3, FSH 44.8, IGF-I 90; ACTH 160 (7.2-63); cortisol 11  24 hour urine collection: Urinary volume 1390 ml; Calcium 167 mg per 24 hours, creatinine 917 mg per 24 hours, cortisol 43  g per 24 hours    DEXA scan was done on that same day and showed lowest T-score is -3.5    Brain MRI - November 7, 2017, interpretation reads as follows:   Large pituitary adenoma present in 2009 and 2010 and has sparsely regressed in the 2011 exam and almost completely resolved with in the 2015 and current exam.  Focal defect in the left side of the pituitary gland, where the prior mass was once noted. The normal plan this displaced to the right, with moderate rightward deviation of the pituitary stalk. There remains some hypoenhancing material in the medial aspect of the left cavernous sinus of uncertain significance; the initial studies showed a subtotal encasement of the left cavernous ICA by tumor and that this material has almost completely resolved except for the his small residual hypoenhancing region.  The component of the tumor extending posteriorly over the left Meckel's cave was present on the initial 3  studies, but that this component has also almost completely resolved.  No evidence of new tumor.  The suprasellar cistern is free of mass.  The mild to generalized cerebral and cerebral volume loss is stable since 2009.  Reminder normal.    Based on these results, we will intensify vitamin D replacement therapy and discuss bisphosphonate use and plan again for a dexamethasone suppression test.  We will try to obtain a CD with the patient's most recent  MRI.    Maine James MD PhD    Division of Endocrinology and Diabetes

## 2018-05-02 LAB — ACTH PLAS-MCNC: 182 PG/ML

## 2018-05-03 DIAGNOSIS — D35.2 PITUITARY MACROADENOMA (H): ICD-10-CM

## 2018-05-03 DIAGNOSIS — E24.9 CUSHING'S SYNDROME (H): ICD-10-CM

## 2018-05-03 LAB — CORTIS SERPL-MCNC: 4 UG/DL (ref 4–22)

## 2018-06-04 ENCOUNTER — TELEPHONE (OUTPATIENT)
Dept: ENDOCRINOLOGY | Facility: CLINIC | Age: 53
End: 2018-06-04

## 2018-06-04 NOTE — TELEPHONE ENCOUNTER
[facility] Call Center    Phone Message    May a detailed message be left on voicemail: yes    Reason for Call: Other: pt's daughter requesting call back with lab results for pt's recent bloodwork     Action Taken: Message routed to:  [facility] ([facility] endo clinc

## 2018-06-11 ENCOUNTER — TELEPHONE (OUTPATIENT)
Dept: ENDOCRINOLOGY | Facility: CLINIC | Age: 53
End: 2018-06-11

## 2018-06-11 NOTE — TELEPHONE ENCOUNTER
----- Message from SHAYY James MD sent at 6/11/2018  9:44 AM CDT -----  Contact: [phone]  Please let her know I am waiting for the images from her last MRI to be entered in our system, so that I can personally review them.  I will communicate with them after I have all information in hands.  Thanks,    Maine James MD PhD    Division of Endocrinology and Diabetes      ----- Message -----     From: Chely Patterson RN     Sent: 6/4/2018  10:49 AM       To: SHAYY James MD    Pts daughter called requesting discussion of recent lab results. Thank you. Slim quarles's daughter [phone]

## 2018-06-11 NOTE — TELEPHONE ENCOUNTER
Family states they will have [facility] call clinic for message from Dr James

## 2018-09-04 ENCOUNTER — OFFICE VISIT (OUTPATIENT)
Dept: ENDOCRINOLOGY | Facility: CLINIC | Age: 53
End: 2018-09-04
Payer: COMMERCIAL

## 2018-09-04 VITALS
HEART RATE: 66 BPM | BODY MASS INDEX: 31.33 KG/M2 | WEIGHT: 176.8 LBS | DIASTOLIC BLOOD PRESSURE: 79 MMHG | HEIGHT: 63 IN | SYSTOLIC BLOOD PRESSURE: 117 MMHG

## 2018-09-04 DIAGNOSIS — E24.0 PITUITARY DEPENDENT CUSHING DISEASE (H): ICD-10-CM

## 2018-09-04 DIAGNOSIS — D35.2 PITUITARY MACROADENOMA (H): ICD-10-CM

## 2018-09-04 DIAGNOSIS — D35.2 PITUITARY MACROADENOMA (H): Primary | ICD-10-CM

## 2018-09-04 LAB
CALCIUM SERPL-MCNC: 9.2 MG/DL (ref 8.5–10.1)
CREAT SERPL-MCNC: 0.67 MG/DL (ref 0.52–1.04)
FSH SERPL-ACNC: 42.8 IU/L
GFR SERPL CREATININE-BSD FRML MDRD: >90 ML/MIN/1.7M2
GLUCOSE SERPL-MCNC: 78 MG/DL (ref 70–99)
HBA1C MFR BLD: 5.3 % (ref 0–5.6)
LH SERPL-ACNC: 21.2 IU/L
PHOSPHATE SERPL-MCNC: 3.1 MG/DL (ref 2.5–4.5)
PROLACTIN SERPL-MCNC: 7 UG/L (ref 3–27)
PTH-INTACT SERPL-MCNC: 71 PG/ML (ref 18–80)
T4 FREE SERPL-MCNC: 0.84 NG/DL (ref 0.76–1.46)
TSH SERPL DL<=0.005 MIU/L-ACNC: 1.38 MU/L (ref 0.4–4)

## 2018-09-04 ASSESSMENT — PAIN SCALES - GENERAL: PAINLEVEL: NO PAIN (0)

## 2018-09-04 NOTE — MR AVS SNAPSHOT
After Visit Summary   9/4/2018    Francis Flores    MRN: [id number]           Patient Information     Date Of Birth          1965        Visit Information        Provider Department      9/4/2018 11:15 AM SHAYY James MD; [email or web address] [facility] Endocrinology        Today's Diagnoses     Pituitary macroadenoma (H)    -  1    Pituitary dependent Cushing disease (H)           Follow-ups after your visit        Follow-up notes from your care team     Return in about 4 months (around 1/4/2019).      Your next 10 appointments already scheduled     Sep 04, 2018  1:15 PM CDT   LAB with  LAB   [facility] ([facility])    [address] [place] [id number] [phone]           Please do not eat 10-12 hours before your appointment if you are coming in fasting for labs on lipids, cholesterol, or glucose (sugar). This does not apply to pregnant women. Water, hot tea and black coffee (with nothing added) are okay. Do not drink other fluids, diet soda or chew gum.            Sep 09, 2018  3:15 PM CDT   MR BRAIN W/O & W CONTRAST with [facility] ([facility])    [address] [place] [id number] [phone]           Take your medicines as usual, unless your doctor tells you not to. Bring a list of your current medicines to your exam (including vitamins, minerals and over-the-counter drugs).  You may or may not receive intravenous (IV) contrast for this exam pending the discretion of the Radiologist.  You do not need to do anything special to prepare.  The MRI machine uses a strong magnet. Please wear clothes without metal (snaps, zippers). A sweatsuit works well, or we may give you a hospital gown.  Please remove any body piercings and hair extensions before you arrive. You will also remove watches, jewelry, hairpins, wallets, dentures, partial dental plates  and hearing aids. You may wear contact lenses, and you may be able to wear your rings. We have a safe place to keep your personal items, but it is safer to leave them at home.  **IMPORTANT** THE INSTRUCTIONS BELOW ARE ONLY FOR THOSE PATIENTS WHO HAVE BEEN PRESCRIBED SEDATION OR GENERAL ANESTHESIA DURING THEIR MRI PROCEDURE:  IF YOUR DOCTOR PRESCRIBED ORAL SEDATION (take medicine to help you relax during your exam):   You must get the medicine from your doctor (oral medication) before you arrive. Bring the medicine to the exam. Do not take it at home. You ll be told when to take it upon arriving for your exam.   Arrive one hour early. Bring someone who can take you home after the test. Your medicine will make you sleepy. After the exam, you may not drive, take a bus or take a taxi by yourself.  IF YOUR DOCTOR PRESCRIBED IV SEDATION:   Arrive one hour early. Bring someone who can take you home after the test. Your medicine will make you sleepy. After the exam, you may not drive, take a bus or take a taxi by yourself.   No eating 6 hours before your exam. You may have clear liquids up until 4 hours before your exam. (Clear liquids include water, clear tea, black coffee and fruit juice without pulp.)  IF YOUR DOCTOR PRESCRIBED ANESTHESIA (be asleep for your exam):   Arrive 1 1/2 hours early. Bring someone who can take you home after the test. You may not drive, take a bus or take a taxi by yourself.   No eating 8 hours before your exam. You may have clear liquids up until 4 hours before your exam. (Clear liquids include water, clear tea, black coffee and fruit juice without pulp.)   You will spend four to five hours in the recovery room.  Please call the Imaging Department at your exam site with any questions.            Jan 08, 2019 10:30 AM CST   (Arrive by 10:15 AM)   RETURN ENDOCRINE with SHAYY James MD   [facility] Endocrinology ([facility] [place])    [address] [place] [address]  [facility] [id number] [phone]              Future tests that were ordered for you today     Open Standing Orders        Priority Remaining Interval Expires Ordered    TSH Routine 2019          Open Future Orders        Priority Expected Expires Ordered    MRI Brain-PITUITARY  w & w/o contrast Routine  2019    Hemoglobin A1c Routine  2019    Glucose Routine  2019    Adrenal corticotropin Routine  2019    Prolactin Routine  2019    Human growth hormone Routine  2019    Insulin Growth Factor 1 by Immunoassay Routine  2019    : Laboratory Miscellaneous Order Routine  2019    : Laboratory Miscellaneous Order Routine  2019    Follicle stimulating hormone Routine  2019    Lutropin Routine  2019    T4 free Routine  2019            Who to contact     Please call your clinic at [phone] to:    Ask questions about your health    Make or cancel appointments    Discuss your medicines    Learn about your test results    Speak to your doctor            Additional Information About Your Visit        [employer] Information     [employer] is an electronic gateway that provides easy, online access to your medical records. With [employer]), you can request a clinic appointment, read your test results, renew a prescription or communicate with your care team.     To sign up for [employer] visit the website at [email or web address]   You will be asked to enter the access code listed below, as well as some personal information. Please follow the directions to create your username and password.     Your access code is: [email or web address]  Expires: 2018  6:30 AM     Your access code will  in 90 days. If you need help or a new code, please contact your [facility] or call [phone] for assistance.        Care  "EveryWhere ID     This is your Care EveryWhere ID. This could be used by other organizations to access your [place] medical records  [id number]        Your Vitals Were     Pulse Height BMI (Body Mass Index)             66 1.6 m (5' 3\") 31.32 kg/m2          Blood Pressure from Last 3 Encounters:   09/04/18 117/79   05/01/18 118/76   12/05/17 132/88    Weight from Last 3 Encounters:   09/04/18 80.2 kg (176 lb 12.8 oz)   05/01/18 81.6 kg (179 lb 14.4 oz)   12/05/17 84.7 kg (186 lb 12.8 oz)               Primary Care Provider Office Phone # Fax #    Eliu Dewey [phone]       [facility] [address] [facility] [address]        Equal Access to Services     ELYSSA MULLER : Hadii caleb blanchard hadasho Soomaali, waaxda luqadaha, qaybta kaalmada adeegyada, holly foley . So [facility] [phone].    ATENCIÓN: Si habla español, tiene a nunez disposición servicios gratuitos de asistencia lingüística. Park al [phone].    We comply with applicable federal civil rights laws and [place] laws. We do not discriminate on the basis of race, color, national origin, age, disability, sex, sexual orientation, or gender identity.            Thank you!     Thank you for choosing [facility] ENDOCRINOLOGY  for your care. Our goal is always to provide you with excellent care. Hearing back from our patients is one way we can continue to improve our services. Please take a few minutes to complete the written survey that you may receive in the mail after your visit with us. Thank you!             Your Updated Medication List - Protect others around you: Learn how to safely use, store and throw away your medicines at www.disposemymeds.org.          This list is accurate as of 9/4/18 12:26 PM.  Always use your most recent med list.                   Brand Name Dispense Instructions for use Diagnosis    calcium carbonate 500 mg {elemental} 500 MG tablet    OS-KI     Take 1 tablet by mouth 2 " times daily        dexamethasone 1 MG tablet    DECADRON    1 tablet    Take 1 tablet (1 mg) by mouth once for 1 dose Take at night (midnight) and draw blood for cortisol 8 hours later (at 8 AM)    Pituitary macroadenoma (H), Cushing's syndrome (H)       ibuprofen 200 MG capsule     60 capsule    Take 400 mg by mouth every 4 hours as needed for fever    Pituitary adenoma (H)       pantoprazole sodium 40 MG packet    PROTONIX     Take 1 packet by mouth daily.        ranitidine 150 MG tablet    ZANTAC     Take 1 tablet by mouth 2 times daily.        VITAMIN D (CHOLECALCIFEROL) PO      Take 2,000 Units by mouth daily

## 2018-09-04 NOTE — PROGRESS NOTES
[facility]    CC: F/u visit for pituitary macroadenoma.     HPI: Mrs. Flores is a 53 year old [place] woman that is seen today for follow-up of a pituitary macroadenoma. The interview was facilitated by an . The patient's daughter was also present at today's visit.    Briefly, Mrs. Flores  had a pituitary macroadenoma diagnosed in 2008. At the time, Mrs. Flores was complaining of headaches. She had a MRI done which revealed a sellar mass, with no evidence of hormone excess or deficiency at that time. On 7/08, cortisol was 19.3, prolactin 8, TSH 1.3, FT4 0.76 (replaced with Levothyroxine for couple of months and then discontinued), LH 35, FSH 58, and normal alpha subunit. On 4/09, cortisol 14.6, TSH 2.84, FT4 1.06, prolactin 6, IGF-1 111. She was seen by Dr. Omer in our clinic initially in 2/2010, and repeated hormonal evaluation was essentially unchanged. She was seen by neurophthalmology in 2/2010, and was found to have no visual field defects. The patient did not have regular clinical follow-up, and she once then seen in the endocrine clinic again in 2015.  At that time,, it was found that her tumor had been stable in size.   There were some concern for Cushing's disease based on a positive dexamethasone suppression test on 12/14/13. Subsequent tests showed increased ACTH and increased free urinary cortisol.  A dexamethasone tests was ordered however it is unclear whether or not the patient did the test.  At some point she reported she had it done in [place], taking the dexamethasone at 11 PM on a Sunday, and having blood work done at 7:30 AM the next morning. I never received the results of these tests and was not able to locate them.  The patient was seen at [facility] in 2017, and had multiple tests done there, and she reports she was told she was cured.  We contacted the [facility] and ask for these results to be faxed to us.    Results from [facility]  dated November 14, 2017, collect at 9:15 AM, show:  CBC: normal; 25-hydroxy vitamin D: Total 20, D3 20, D2 <4  Sodium 141, potassium 4.5, calcium 9.7, glucose at 85, alkaline phosphatase 87, AST 16, creatinine 0.8, albumin 4.0.  TSH 1.7, free T4 0.9, prolactin 7.3, FSH 44.8, IGF-I 90; ACTH 160 (7.2-63); cortisol 11  24 hour urine collection: Urinary volume 1390 ml; Calcium 167 mg per 24 hours, creatinine 917 mg per 24 hours, cortisol 43  g per 24 hours    Brain MRI - November 7, 2017, interpretation reads as follows:   Large pituitary adenoma present in 2009 and 2010 and has sparsely regressed in the 2011 exam and almost completely resolved with in the 2015 and current exam.  Focal defect in the left side of the pituitary gland, where the prior mass was once noted. The normal plan this displaced to the right, with moderate rightward deviation of the pituitary stalk. There remains some hypoenhancing material in the medial aspect of the left cavernous sinus of uncertain significance; the initial studies showed a subtotal encasement of the left cavernous ICA by tumor and that this material has almost completely resolved except for the his small residual hypoenhancing region.  The component of the tumor extending posteriorly over the left Meckel's cave was present on the initial 3 studies, but that this component has also almost completely resolved.  No evidence of new tumor.  The suprasellar cistern is free of mass.  The mild to generalized cerebral and cerebral volume loss is stable since 2009.  Reminder normal.    DEXA scan was done on that same day and showed lowest T-score is -3.5    Ms. Flores continues to complain to report headache.  Headache has been noted since 2008, but the frequency and intensity of the headache episodes have varied over the years. Tylenol offers good relief.  Headaches are pressure-like, specially in the temporal and occipital areas, some as prior. Mrs. Flores have a history of nausea and  some dizziness that started at about the same time as the headaches, but this has now improved.  She continues to complain of her eyesight, that is getting worse, with no evident field defect. The patient denies galactorrhea or breast symptoms. Last menstrual period was about 16 years ago, after having her last baby, which she breast fed for 3 years. Menarche was at age of 13 with regular periods afterwards. She now reports she has been gaining some weight. She denies polyuria or polydipsia. She was diagnosed with hypertension and is on hydrochlorothiazide 12.5 mg/day.     Mrs. Flores had a right leg (knee) fracture in 11/2008. She was found to have mild vitamin D deficiency, with 25-OH vitamin D levels of 24, and a PTH of 115. She was placed on vitamin D2 50,000 units per week for 12 weeks, followed by maintenance with vitamin D3 2,000 units daily.  Vitamin D is no longer on her medication list and she is not sure whether or not she is taking it regularly.    The tumor size was reported as follows:   7/08 1.4 x 2.1 x 1.3 cm   5/09 1.5 x 2.3 x 1.3 cm   12/09 1.5 x 2.5 x 1.6 cm   6/10 1.3 x 2.3 x 1.3 cm   11/11 2.0 x 1.2 cm  1/14 1.8 x 0.9 cm  4/15 1.8 x 1.2 x 1.9 cm   11/17    ???    ROS:  11 point ROS as detailed in the HPI, below, or negative  Constitutional: no fevers, chills, fatigue, unintentional weight gain/loss, or change in appetite   HEENT: Endorses headaches and no vision changes; no diplopia  CV: no chest pain or palpitations.   RESP: no dyspnea, cough, SOB.   GI: no vomiting.    Endo: no polyuria, polydipsia, temperature intolerance or sleep problems     Medications  Current Outpatient Prescriptions   Medication     calcium carbonate (OS- MG [place]. [place]) 500 tablet     ibuprofen 200 MG capsule     pantoprazole sodium 40 MG tablet     ranitidine (ZANTAC) 150 MG tablet     VITAMIN D, CHOLECALCIFEROL, PO     dexamethasone (DECADRON) 1 MG tablet     No current facility-administered medications for this  "visit.      Family Hx   No Family Hx of pituitary disease, asthma or osteoporosis.   Has 4 healthy children, 24 yrs old girl, 18 yrs old twin boys and 16 yrs old boy.     Social Hx   Behavioral history: No tobacco use.   Home environment: No secondhand tobacco smoke in home.   Mrs. Flores does not work.   She lives with her  and their 4 children. Mrs. Flores lives in [place].  She does not smoke or consume alcohol.     PMH   Illnesses:   Non functioning (?) pituitary tumor as per HPI   Premature ovarian failure   Bone fracture   Surgeries:   Right knee (11/2008)     Menstrual History:   Menarche at age 13   Cycles were regular   Gestae 8 Para 4 (one gemellar pregnancy)   Menopause 14 yrs ago.     Physical Exam:  /79  Pulse 66  Ht 1.6 m (5' 3\")  Wt 80.2 kg (176 lb 12.8 oz)  BMI 31.32 kg/m2  Body mass index is 31.32 kg/(m^2).  General : Alert, oriented X4, no acute distress, no pallor, jaundice or cyanosis.   HEENT : NCAT, EOIM, no scleral icterus or proptosis. Gross evaluation of the visual fields by confrontation is normal/small defect on the upper lateral gaze    Thyroid/lymph: no LN enlargement, thyroid is normal in size   Lungs : rhonchi scattered throughout, normal excursion   CV: Normal S1, Normal S2, No Murmurs   Abdomen : soft and lax, non tender, no hepatosplenomegaly, no ascites, bowel sounds are present.   Neuro : CN II-XII intact; tone, power and reflexes are normal and symmetrical in upper and lower extremities, no tremor of outstretched hands   Extremities : pulses are ++ in both limbs in upper and lower extremities, no ulcers, no edema.   skin: normal temperature, turgor, texture and thickness; no rashes on exposed skin. No hyperpigmentation of palmar creases   Psychological: appropriate mood and affect    Imaging Results   MRI BRAIN 16 Jun 2010 - MRI BRAIN W AND WO CONTRAST*   Findings: A sellar mass is noted measuring 1.3 x 2.3 x 1.3 cm (AP by transverse by craniocaudal, not " significantly changed from remeasurement of comparison MRI 12/16/2009. The heterogeneously enhancing mass expands the sella with iso to hyperintensity on T1-weighted images and mild hyperintensity on T2-weighted images. The pituitary stalk is slightly deviated to the right, unchanged. There is no displacement of the optic chiasm. The carotid flow-voids are patent.   Trace periventricular T2 hyperintensity is again noted with a small focal area of increased T2 signal adjacent to the lateral horn of the left lateral ventricles similar to outside MRI 5/12/2009.   Impression: No change in size of sellar mass lesion compatible with a pituitary macroadenoma.     DEXA HIP PELVIS SPINE 12 May 2010   Presuming that the patient is not postmenopausal, the lowest and valid Z-score of -4.8 at the level of the lumbar spine is BELOW the expected range for age. (see Ref. #3) According to the ISCD position statements at www.iscd.org, a Z-score more negative than -2.0 is below expected range for age. Note the wide range in BMD values and T- scores within the lumbar   spine (L2 at -3.5, L3 at -2.3). This pattern suggests degenerative joint disease or occult fractures at the lumbar level that would limit the detection of low bone density in the L1 - L4 spine region. The lumbar spine is therefore represented by L1-L2. Note the very negative Z- score within the lumbar spine.This suggests that secondary causes of low bone density might be present. Repeat DXA testing in 1-2 years could be considered. Clinical correlation recommended.     CHEST TWO VIEW* 04 Feb 2010 1  Findings: Haziness is seen within the lower lungs which likely represents summation shadow. No definite airspace opacities are seen. Cardiac silhouette and pulmonary vasculature are within normal limits. There is no pneumothorax.   Impression: No acute airspace opacity.     Brain/Pituitary MRI without and with contrast  Findings:   1.8 x 0.9 cm hypoenhancing left sellar mass  (series 13, image 10), previously 2.2 x 1.2 cm on 11/30/2011 and 2.5 x 1.3 cm on 11/8/2010. On coronal images, the mass now demonstrates a concave superior margin, previously convex on 11/8/2010 and intermediate 11/30/2011. The mass continues to invade the left cavernous sinus with enhancing  soft tissue encasing the cavernous portion of the left carotid artery and displacing the right internal carotid artery. Carotid flow-voids are patent. There is erosion of the dorsum sella. The pituitary sac is deviated to the right. There is preserved CSF space between the mass and the optic chiasm and optic nerves. The ventricles are not enlarged  out of proportion to the surgical sulci. Diffusion-weighted images demonstrate no evidence of acute infarct. No intracranial hemorrhage, mass effect, midline shift or extra-axial  fluid collection. Minimal nonspecific punctate periventricular white matter T2 FLAIR hyperintensities, unchanged since previous exam. Calvarium and orbits are unremarkable. The paranasal sinuses and mastoid air cells are clear.  IMPRESSION  1. Decrease in size of left sellar mass, which invades the left cavernous sinus and encases the cavernous carotid artery, compatible with known pituitary macroadenoma. The mass does not abut the optic chiasm or nerves. No hydrocephalus.  2. Stable minimal nonspecific periventricular white matter T2 FLAIR hyperintensity, which may represent early onset chronic small vessel ischemic disease or sequelae of prior vasculopathy, Infection/inflammation.    4/17/15  Brain/Pituitary MRI without and with contrast  History: Benign neoplasm of pituitary gland and craniopharyngeal duct (pouch).   Comparison: MRI dated 1/2/14   Findings: Again noted is a mass within the sella predominantly along the left lateral aspect with extension into the left cavernous sinus. The mass  appears isointense on the T1 and hypointense on the T2-weighted images and demonstrates enhancement. It encases  the cavernous segment of the  left ICA. The mass measures 1.8 x 1.2 x 1.9 cm (TR, CC, AP), and is unchanged in size and appearance since prior study. There is no  extension into the suprasellar cistern. No mass effect on the optic chiasm. The pituitary stalk is displaced to the level right as before.  The major cavernous carotid vascular flow-voids appear patent.  No mass effect, midline shift, or significant enlargement of the  ventricles. Minimal T2 hyperintensity are seen in the bilateral periventricular white matter are nonspecific and unchanged since prior  study.  Impression:   1. Stable appearance of mass within the sella predominantly along the left lateral aspect with extension into the left cavernous sinus,  consistent with known pituitary macroadenoma. No mass effect on the optic chiasm. No hydrocephalus.  2. Unchanged minimal nonspecific periventricular white matter T2 hyperintensities likely representing early onset of chronic small  vessel ischemic changes.      Brain MRI - November 7, 2017, done at [facility] - Interpretation reads as follows:   Large pituitary adenoma present in 2009 and 2010 and has sparsely regressed in the 2011 exam and almost completely resolved with in the 2015 and current exam.  Focal defect in the left side of the pituitary gland, where the prior mass was once noted. The normal plan this displaced to the right, with moderate rightward deviation of the pituitary stalk. There remains some hypoenhancing material in the medial aspect of the left cavernous sinus of uncertain significance; the initial studies showed a subtotal encasement of the left cavernous ICA by tumor and that this material has almost completely resolved except for the his small residual hypoenhancing region.  The component of the tumor extending posteriorly over the left Meckel's cave was present on the initial 3 studies, but that this component has also almost completely resolved.  No evidence of new tumor.   The suprasellar cistern is free of mass.  The mild to generalized cerebral and cerebral volume loss is stable since 2009.  Reminder normal. I did not have access to the images      DEXA scan - November 7, 2017, done at [facility] showed lowest T-score is -3.5    Lab results    [facility]. Results from November 14, 2017, collect at 9:15 AM, show:  CBC: normal; 25-hydroxy vitamin D: Total 20, D3 20, D2 <4  Sodium 141, potassium 4.5, calcium 9.7, glucose at 85, alkaline phosphatase 87, AST 16, creatinine 0.8, albumin 4.0.  TSH 1.7, free T4 0.9, prolactin 7.3, FSH 44.8, IGF-I 90; ACTH 160 (7.2-63); cortisol 11  24 hour urine collection: Urinary volume 1390 ml; Calcium 167 mg per 24 hours, creatinine 917 mg per 24 hours, cortisol 43  g per 24 hours      ENDO PITUITARY LABS-[facility] Latest Ref Rng 1/1/2014 12/3/2013   TSH 0.4 - 5.0 mU/L  1.54   PROLACTIN 3 - 27 ug/L  5   PROLACTIN   4.4   CORTISOL FREE, URINE  69.5 (H)    CORTISOL FREE, URINE RANDOM  45.70    CORTISOL UG/G CRT  47.60    CORTISOL, SERUM 4 - 22 ug/dL  11.9   ADRENAL CORTICOTROPIN 10 - 47 pg/mL  200 (H)   FSH   50.5   LUTROPIN   26.6   ESTRADIOL   30    - 144 mmol/L  143   POTASSIUM 3.4 - 5.3 mmol/L  3.9       ENDO PITUITARY LABS-[facility] Latest Ref Rng 4/12/2012   CORTISOL FREE, URINE  17.3   CORTISOL FREE, URINE RANDOM  10.90   CORTISOL UG/G CRT  21.80     ENDO PITUITARY LABS-[facility] Latest Ref Rng 2/2/2012   CORTISOL FREE, URINE  29.7   CORTISOL FREE, URINE RANDOM  53.00   CORTISOL UG/G CRT  44.17     ENDO PITUITARY LABS-[facility] Latest Ref Rng 1/17/2012   CORTISOL, SERUM 4 - 22 ug/dL 14.9   ADRENAL CORTICOTROPIN 10 - 47 pg/mL 197 (H)    - 144 mmol/L 143   POTASSIUM 3.4 - 5.3 mmol/L 4.0     ENDO PITUITARY LABS-[facility] Latest Ref Rng 12/14/2011   CORTISOL, SERUM 4 - 22 ug/dL 3.4 (L)   ADRENAL CORTICOTROPIN 10 - 47 pg/mL 72 (H)     ENDO PITUITARY LABS-[facility] Latest Ref Rng 12/9/2011   CORTISOL FREE, URINE  41.2   CORTISOL FREE, URINE RANDOM  24.70   CORTISOL UG/G CRT   34.31     ENDO PITUITARY LABS-[facility] Latest Ref Rng 12/7/2011   TSH 0.4 - 5.0 mU/L 1.10   PROLACTIN 3 - 27 ug/L 6   CORTISOL, SERUM 4 - 22 ug/dL 13.6   ADRENAL CORTICOTROPIN 10 - 47 pg/mL 139 (H)   FSH  43.0   LUTROPIN  22.6   INSULIN GROWTH FACTOR 1 94 - 252 ng/mL 78 (L)   INSULIN GROWTH FACTOR 1 SD SCORE  NEG 2.4   GLUCOSE 60 - 99 mg/dL 82     ENDO PITUITARY LABS-[facility] Latest Ref Rng 11/8/2010   CORTISOL FREE, URINE  31.3   CORTISOL FREE, URINE RANDOM  32.20   CORTISOL UG/G CRT  33.89     ENDO PITUITARY LABS-[facility] Latest Ref Rng 11/2/2010   TSH 0.4 - 5.0 mU/L 1.44   PROLACTIN 3 - 27 ug/L 6   CORTISOL, SERUM 4 - 22 ug/dL 15.8   ADRENAL CORTICOTROPIN 10 - 47 pg/mL 253 (H)   FSH  45.7   LUTROPIN  28.1   ESTRADIOL  19   URINE OSMOLALITY 100 - 1200 mmol/kg 727   INSULIN GROWTH FACTOR 1 94 - 252 ng/mL 140   INSULIN GROWTH FACTOR 1 SD SCORE  NEG 1.1   GLUCOSE 60 - 99 mg/dL 83     ENDO PITUITARY LABS-[facility] Latest Ref Rng 5/6/2010 2/4/2010   TSH 0.4 - 5.0 mU/L 1.62 1.74   PROLACTIN 3 - 27 ug/L  7   CORTISOL, SERUM 4 - 22 ug/dL  14.0   FSH   49.8   LUTROPIN   33.7   ESTRADIOL   11   INSULIN GROWTH FACTOR 1 94 - 252 ng/mL  101   INSULIN GROWTH FACTOR 1 SD SCORE   NEG 2.0   GLUCOSE 60 - 99 mg/dL       ENDO PITUITARY LABS-[facility] Latest Ref Rng 4/15/2009   TSH 0.4 - 5.0 mU/L 2.84   PROLACTIN 3 - 27 ug/L 6   CORTISOL, SERUM 4 - 22 ug/dL 14.6   TESTOSTERONE TOTAL 14 - 75 ng/dL 35   INSULIN GROWTH FACTOR 1 94 - 252 ng/mL 111   INSULIN GROWTH FACTOR 1 SD SCORE  NEG 1.8       Assessment and Plan:  Mrs. Flores is a 53 year old woman with a pituitary macroadenoma.   1. Pituitary macroadenoma/Cushing's disease: Previous evaluation suggested this is a non-functioning lesion that is growing slowly. There was no clinical or biochemical evidence of hyperprolactinemia. LH and FSH levels were appropriately elevated in the setting of primary ovarian failure. Patient had symptoms suggestive of hypothyroidism and was placed on levothyroxine in the past.  Repeated TFT after hormone replacement was discontinued were normal. Mrs. Flores has normal IGF-1 levels and has no signs or symptoms of diabetes insipidus. Recent MRI (November 2017) indicates spontaneous reduction of the tumor size, however ACTH levels remain elevated and the patient failed on dexamethasone suppression test.  We will we will repeat pituitary function studies and obtain a pituitary MRI for comparison.   2. Vitamin D deficiency: Mrs. Flores has a history of bone fracture and low vitamin D levels, along with increased PTH levels. Patient is on vitamin D replacement.  We will repeat calcium metabolism studies.    3.  Osteoporosis: Bone densitometry done in November 2017 indicate osteoporosis (negative T score of 3.5).  The patient has vitamin D deficiency and Cushing's disease.  We will manage her Cushing's disease as above and we will discuss bisphosphonate therapy with the patient as indicated.  Her Cushing disease and premature menopause are likely contributing to her osteoporosis.     Plan was discussed with patient in detail, with the help of an  and the patient's daughter, and she verbalizes understanding.  The patient has previously provided me with her daughter's phone number [phone], [employer], and she gave me authorization to discuss any aspect related to her health with her daughter.      Orders Placed This Encounter   Procedures     MRI Brain-PITUITARY  w & w/o contrast     Adrenal corticotropin     Prolactin     Human growth hormone     Insulin Growth Factor 1 by Immunoassay     : Laboratory Miscellaneous Order     : Laboratory Miscellaneous Order     Follicle stimulating hormone     Lutropin     TSH     T4 free     Hemoglobin A1c     Glucose     25 Hydroxyvitamin D2 and D3     Calcium     Phosphorus     Parathyroid Hormone Intact     Creatinine       Maine James MD PhD    Division of Endocrinology and Diabetes

## 2018-09-04 NOTE — LETTER
9/4/2018     RE: Francis Flores  [address]  [facility] [address]     Dear Colleague,    Thank you for referring your patient, Francis Flores, to the [facility] ENDOCRINOLOGY at [facility]. Please see a copy of my visit note below.    Endocrinology and Diabetes Outpatient Clinic    CC: F/u visit for pituitary macroadenoma.     HPI: Mrs. Flores is a 53 year old Citizen of [place] woman that is seen today for follow-up of a pituitary macroadenoma. The interview was facilitated by an . The patient's daughter was also present at today's visit.    Briefly, Mrs. Flores  had a pituitary macroadenoma diagnosed in 2008. At the time, Mrs. Flores was complaining of headaches. She had a MRI done which revealed a sellar mass, with no evidence of hormone excess or deficiency at that time. On 7/08, cortisol was 19.3, prolactin 8, TSH 1.3, FT4 0.76 (replaced with Levothyroxine for couple of months and then discontinued), LH 35, FSH 58, and normal alpha subunit. On 4/09, cortisol 14.6, TSH 2.84, FT4 1.06, prolactin 6, IGF-1 111. She was seen by Dr. Omer in our clinic initially in 2/2010, and repeated hormonal evaluation was essentially unchanged. She was seen by neurophthalmology in 2/2010, and was found to have no visual field defects. The patient did not have regular clinical follow-up, and she once then seen in the endocrine clinic again in 2015.  At that time,, it was found that her tumor had been stable in size.   There were some concern for Cushing's disease based on a positive dexamethasone suppression test on 12/14/13. Subsequent tests showed increased ACTH and increased free urinary cortisol.  A dexamethasone tests was ordered however it is unclear whether or not the patient did the test.  At some point she reported she had it done in [place], taking the dexamethasone at 11 PM on a Sunday, and having blood work done at 7:30 AM the next morning. I never received  the results of these tests and was not able to locate them.  The patient was seen at [facility] in 2017, and had multiple tests done there, and she reports she was told she was cured.  We contacted the [facility] and ask for these results to be faxed to us.    Results from [facility] dated November 14, 2017, collect at 9:15 AM, show:  CBC: normal; 25-hydroxy vitamin D: Total 20, D3 20, D2 <4  Sodium 141, potassium 4.5, calcium 9.7, glucose at 85, alkaline phosphatase 87, AST 16, creatinine 0.8, albumin 4.0.  TSH 1.7, free T4 0.9, prolactin 7.3, FSH 44.8, IGF-I 90; ACTH 160 (7.2-63); cortisol 11  24 hour urine collection: Urinary volume 1390 ml; Calcium 167 mg per 24 hours, creatinine 917 mg per 24 hours, cortisol 43  g per 24 hours    Brain MRI - November 7, 2017, interpretation reads as follows:   Large pituitary adenoma present in 2009 and 2010 and has sparsely regressed in the 2011 exam and almost completely resolved with in the 2015 and current exam.  Focal defect in the left side of the pituitary gland, where the prior mass was once noted. The normal plan this displaced to the right, with moderate rightward deviation of the pituitary stalk. There remains some hypoenhancing material in the medial aspect of the left cavernous sinus of uncertain significance; the initial studies showed a subtotal encasement of the left cavernous ICA by tumor and that this material has almost completely resolved except for the his small residual hypoenhancing region.  The component of the tumor extending posteriorly over the left Meckel's cave was present on the initial 3 studies, but that this component has also almost completely resolved.  No evidence of new tumor.  The suprasellar cistern is free of mass.  The mild to generalized cerebral and cerebral volume loss is stable since 2009.  Reminder normal.    DEXA scan was done on that same day and showed lowest T-score is -3.5    MsPricila Flores continues to complain to report  headache.  Headache has been noted since 2008, but the frequency and intensity of the headache episodes have varied over the years. Tylenol offers good relief.  Headaches are pressure-like, specially in the temporal and occipital areas, some as prior. Mrs. Flores have a history of nausea and some dizziness that started at about the same time as the headaches, but this has now improved.  She continues to complain of her eyesight, that is getting worse, with no evident field defect. The patient denies galactorrhea or breast symptoms. Last menstrual period was about 16 years ago, after having her last baby, which she breast fed for 3 years. Menarche was at age of 13 with regular periods afterwards. She now reports she has been gaining some weight. She denies polyuria or polydipsia. She was diagnosed with hypertension and is on hydrochlorothiazide 12.5 mg/day.     Mrs. Flores had a right leg (knee) fracture in 11/2008. She was found to have mild vitamin D deficiency, with 25-OH vitamin D levels of 24, and a PTH of 115. She was placed on vitamin D2 50,000 units per week for 12 weeks, followed by maintenance with vitamin D3 2,000 units daily.  Vitamin D is no longer on her medication list and she is not sure whether or not she is taking it regularly.    The tumor size was reported as follows:   7/08 1.4 x 2.1 x 1.3 cm   5/09 1.5 x 2.3 x 1.3 cm   12/09 1.5 x 2.5 x 1.6 cm   6/10 1.3 x 2.3 x 1.3 cm   11/11 2.0 x 1.2 cm  1/14 1.8 x 0.9 cm  4/15 1.8 x 1.2 x 1.9 cm   11/17    ???    ROS:  11 point ROS as detailed in the HPI, below, or negative  Constitutional: no fevers, chills, fatigue, unintentional weight gain/loss, or change in appetite   HEENT: Endorses headaches and no vision changes; no diplopia  CV: no chest pain or palpitations.   RESP: no dyspnea, cough, SOB.   GI: no vomiting.    Endo: no polyuria, polydipsia, temperature intolerance or sleep problems     Medications  Current Outpatient Prescriptions   Medication      "calcium carbonate (OS- MG [place]. [place]) 500 tablet     ibuprofen 200 MG capsule     pantoprazole sodium 40 MG tablet     ranitidine (ZANTAC) 150 MG tablet     VITAMIN D, CHOLECALCIFEROL, PO     dexamethasone (DECADRON) 1 MG tablet     No current facility-administered medications for this visit.      Family Hx   No Family Hx of pituitary disease, asthma or osteoporosis.   Has 4 healthy children, 24 yrs old girl, 18 yrs old twin boys and 16 yrs old boy.     Social Hx   Behavioral history: No tobacco use.   Home environment: No secondhand tobacco smoke in home.   Mrs. Flores does not work.   She lives with her  and their 4 children. Mrs. Flores lives in [place].  She does not smoke or consume alcohol.     PMH   Illnesses:   Non functioning (?) pituitary tumor as per HPI   Premature ovarian failure   Bone fracture   Surgeries:   Right knee (11/2008)     Menstrual History:   Menarche at age 13   Cycles were regular   Gestae 8 Para 4 (one gemellar pregnancy)   Menopause 14 yrs ago.     Physical Exam:  /79  Pulse 66  Ht 1.6 m (5' 3\")  Wt 80.2 kg (176 lb 12.8 oz)  BMI 31.32 kg/m2  Body mass index is 31.32 kg/(m^2).  General : Alert, oriented X4, no acute distress, no pallor, jaundice or cyanosis.   HEENT : NCAT, EOIM, no scleral icterus or proptosis. Gross evaluation of the visual fields by confrontation is normal/small defect on the upper lateral gaze    Thyroid/lymph: no LN enlargement, thyroid is normal in size   Lungs : rhonchi scattered throughout, normal excursion   CV: Normal S1, Normal S2, No Murmurs   Abdomen : soft and lax, non tender, no hepatosplenomegaly, no ascites, bowel sounds are present.   Neuro : CN II-XII intact; tone, power and reflexes are normal and symmetrical in upper and lower extremities, no tremor of outstretched hands   Extremities : pulses are ++ in both limbs in upper and lower extremities, no ulcers, no edema.   skin: normal temperature, turgor, texture and thickness; no " rashes on exposed skin. No hyperpigmentation of palmar creases   Psychological: appropriate mood and affect    Imaging Results   MRI BRAIN 16 Jun 2010 - MRI BRAIN W AND WO CONTRAST*   Findings: A sellar mass is noted measuring 1.3 x 2.3 x 1.3 cm (AP by transverse by craniocaudal, not significantly changed from remeasurement of comparison MRI 12/16/2009. The heterogeneously enhancing mass expands the sella with iso to hyperintensity on T1-weighted images and mild hyperintensity on T2-weighted images. The pituitary stalk is slightly deviated to the right, unchanged. There is no displacement of the optic chiasm. The carotid flow-voids are patent.   Trace periventricular T2 hyperintensity is again noted with a small focal area of increased T2 signal adjacent to the lateral horn of the left lateral ventricles similar to outside MRI 5/12/2009.   Impression: No change in size of sellar mass lesion compatible with a pituitary macroadenoma.     DEXA HIP PELVIS SPINE 12 May 2010   Presuming that the patient is not postmenopausal, the lowest and valid Z-score of -4.8 at the level of the lumbar spine is BELOW the expected range for age. (see Ref. #3) According to the ISCD position statements at www.iscd.org, a Z-score more negative than -2.0 is below expected range for age. Note the wide range in BMD values and T- scores within the lumbar   spine (L2 at -3.5, L3 at -2.3). This pattern suggests degenerative joint disease or occult fractures at the lumbar level that would limit the detection of low bone density in the L1 - L4 spine region. The lumbar spine is therefore represented by L1-L2. Note the very negative Z- score within the lumbar spine.This suggests that secondary causes of low bone density might be present. Repeat DXA testing in 1-2 years could be considered. Clinical correlation recommended.     CHEST TWO VIEW* 04 Feb 2010 1  Findings: Haziness is seen within the lower lungs which likely represents summation shadow.  No definite airspace opacities are seen. Cardiac silhouette and pulmonary vasculature are within normal limits. There is no pneumothorax.   Impression: No acute airspace opacity.     Brain/Pituitary MRI without and with contrast  Findings:   1.8 x 0.9 cm hypoenhancing left sellar mass (series 13, image 10), previously 2.2 x 1.2 cm on 11/30/2011 and 2.5 x 1.3 cm on 11/8/2010. On coronal images, the mass now demonstrates a concave superior margin, previously convex on 11/8/2010 and intermediate 11/30/2011. The mass continues to invade the left cavernous sinus with enhancing  soft tissue encasing the cavernous portion of the left carotid artery and displacing the right internal carotid artery. Carotid flow-voids are patent. There is erosion of the dorsum sella. The pituitary sac is deviated to the right. There is preserved CSF space between the mass and the optic chiasm and optic nerves. The ventricles are not enlarged  out of proportion to the surgical sulci. Diffusion-weighted images demonstrate no evidence of acute infarct. No intracranial hemorrhage, mass effect, midline shift or extra-axial  fluid collection. Minimal nonspecific punctate periventricular white matter T2 FLAIR hyperintensities, unchanged since previous exam. Calvarium and orbits are unremarkable. The paranasal sinuses and mastoid air cells are clear.  IMPRESSION  1. Decrease in size of left sellar mass, which invades the left cavernous sinus and encases the cavernous carotid artery, compatible with known pituitary macroadenoma. The mass does not abut the optic chiasm or nerves. No hydrocephalus.  2. Stable minimal nonspecific periventricular white matter T2 FLAIR hyperintensity, which may represent early onset chronic small vessel ischemic disease or sequelae of prior vasculopathy, Infection/inflammation.    4/17/15  Brain/Pituitary MRI without and with contrast  History: Benign neoplasm of pituitary gland and craniopharyngeal duct (pouch).    Comparison: MRI dated 1/2/14   Findings: Again noted is a mass within the sella predominantly along the left lateral aspect with extension into the left cavernous sinus. The mass  appears isointense on the T1 and hypointense on the T2-weighted images and demonstrates enhancement. It encases the cavernous segment of the  left ICA. The mass measures 1.8 x 1.2 x 1.9 cm (TR, CC, AP), and is unchanged in size and appearance since prior study. There is no  extension into the suprasellar cistern. No mass effect on the optic chiasm. The pituitary stalk is displaced to the level right as before.  The major cavernous carotid vascular flow-voids appear patent.  No mass effect, midline shift, or significant enlargement of the  ventricles. Minimal T2 hyperintensity are seen in the bilateral periventricular white matter are nonspecific and unchanged since prior  study.  Impression:   1. Stable appearance of mass within the sella predominantly along the left lateral aspect with extension into the left cavernous sinus,  consistent with known pituitary macroadenoma. No mass effect on the optic chiasm. No hydrocephalus.  2. Unchanged minimal nonspecific periventricular white matter T2 hyperintensities likely representing early onset of chronic small  vessel ischemic changes.      Brain MRI - November 7, 2017, done at [facility] - Interpretation reads as follows:   Large pituitary adenoma present in 2009 and 2010 and has sparsely regressed in the 2011 exam and almost completely resolved with in the 2015 and current exam.  Focal defect in the left side of the pituitary gland, where the prior mass was once noted. The normal plan this displaced to the right, with moderate rightward deviation of the pituitary stalk. There remains some hypoenhancing material in the medial aspect of the left cavernous sinus of uncertain significance; the initial studies showed a subtotal encasement of the left cavernous ICA by tumor and that this  material has almost completely resolved except for the his small residual hypoenhancing region.  The component of the tumor extending posteriorly over the left Meckel's cave was present on the initial 3 studies, but that this component has also almost completely resolved.  No evidence of new tumor.  The suprasellar cistern is free of mass.  The mild to generalized cerebral and cerebral volume loss is stable since 2009.  Reminder normal. I did not have access to the images      [facility] showed lowest T-score is -3.5    Lab results    [facility]. Results from November 14, 2017, collect at 9:15 AM, show:  CBC: normal; 25-hydroxy vitamin D: Total 20, D3 20, D2 <4  Sodium 141, potassium 4.5, calcium 9.7, glucose at 85, alkaline phosphatase 87, AST 16, creatinine 0.8, albumin 4.0.  TSH 1.7, free T4 0.9, prolactin 7.3, FSH 44.8, IGF-I 90; ACTH 160 (7.2-63); cortisol 11  24 hour urine collection: Urinary volume 1390 ml; Calcium 167 mg per 24 hours, creatinine 917 mg per 24 hours, cortisol 43  g per 24 hours      ENDO PITUITARY LABS-[facility] Latest Ref Rng 1/1/2014 12/3/2013   TSH 0.4 - 5.0 mU/L  1.54   PROLACTIN 3 - 27 ug/L  5   PROLACTIN   4.4   CORTISOL FREE, URINE  69.5 (H)    CORTISOL FREE, URINE RANDOM  45.70    CORTISOL UG/G CRT  47.60    CORTISOL, SERUM 4 - 22 ug/dL  11.9   ADRENAL CORTICOTROPIN 10 - 47 pg/mL  200 (H)   FSH   50.5   LUTROPIN   26.6   ESTRADIOL   30    - 144 mmol/L  143   POTASSIUM 3.4 - 5.3 mmol/L  3.9       ENDO PITUITARY LABS-[facility] Latest Ref Rng 4/12/2012   CORTISOL FREE, URINE  17.3   CORTISOL FREE, URINE RANDOM  10.90   CORTISOL UG/G CRT  21.80     ENDO PITUITARY LABS-[facility] Latest Ref Rng 2/2/2012   CORTISOL FREE, URINE  29.7   CORTISOL FREE, URINE RANDOM  53.00   CORTISOL UG/G CRT  44.17     ENDO PITUITARY LABS-[facility] Latest Ref Rng 1/17/2012   CORTISOL, SERUM 4 - 22 ug/dL 14.9   ADRENAL CORTICOTROPIN 10 - 47 pg/mL 197 (H)    - 144 mmol/L 143    POTASSIUM 3.4 - 5.3 mmol/L 4.0     ENDO PITUITARY LABS-[facility] Latest Ref Rng 12/14/2011   CORTISOL, SERUM 4 - 22 ug/dL 3.4 (L)   ADRENAL CORTICOTROPIN 10 - 47 pg/mL 72 (H)     ENDO PITUITARY LABS-[facility] Latest Ref Rng 12/9/2011   CORTISOL FREE, URINE  41.2   CORTISOL FREE, URINE RANDOM  24.70   CORTISOL UG/G CRT  34.31     [facility]2011   TSH 0.4 - 5.0 mU/L 1.10   PROLACTIN 3 - 27 ug/L 6   CORTISOL, SERUM 4 - 22 ug/dL 13.6   ADRENAL CORTICOTROPIN 10 - 47 pg/mL 139 (H)   FSH  43.0   LUTROPIN  22.6   INSULIN GROWTH FACTOR 1 94 - 252 ng/mL 78 (L)   INSULIN GROWTH FACTOR 1 SD SCORE  NEG 2.4   GLUCOSE 60 - 99 mg/dL 82     [facility] 11/8/2010   CORTISOL FREE, URINE  31.3   CORTISOL FREE, URINE RANDOM  32.20   CORTISOL UG/G CRT  33.89     [facility] Lat[facility]ng 11/2/2010   TSH 0.4 - 5.0 mU/L 1.44   PROLACTIN 3 - 27 ug/L 6   CORTISOL, SERUM 4 - 22 ug/dL 15.8   ADRENAL CORTICOTROPIN 10 - 47 pg/mL 253 (H)   FSH  45.7   LUTROPIN  28.1   ESTRADIOL  19   URINE OSMOLALITY 100 - 1200 mmol/kg 727   INSULIN GROWTH FACTOR 1 94 - 252 ng/mL 140   INSULIN GROWTH FACTOR 1 SD SCORE  NEG 1.1   GLUCOSE 60 - 99 mg/dL 83     [facility] Lat[facility]ng 5/6/2010 2/4/2010   TSH 0.4 - 5.0 mU/L 1.62 1.74   PROLACTIN 3 - 27 ug/L  7   CORTISOL, SERUM 4 - 22 ug/dL  14.0   FSH   49.8   LUTROPIN   33.7   ESTRADIOL   11   INSULIN GROWTH FACTOR 1 94 - 252 ng/mL  101   INSULIN GROWTH FACTOR 1 SD SCORE   NEG 2.0   GLUCOSE 60 - 99 mg/dL       ENDO PITUITARY LABS-[facility] Latest Ref Rng 4/15/2009   TSH 0.4 - 5.0 mU/L 2.84   PROLACTIN 3 - 27 ug/L 6   CORTISOL, SERUM 4 - 22 ug/dL 14.6   TESTOSTERONE TOTAL 14 - 75 ng/dL 35   INSULIN GROWTH FACTOR 1 94 - 252 ng/mL 111   INSULIN GROWTH FACTOR 1 SD SCORE  NEG 1.8       Assessment and Plan:  Mrs. Flores is a 53 year old woman with a pituitary macroadenoma.   1. Pituitary macroadenoma/Cushing's disease: Previous evaluation suggested  this is a non-functioning lesion that is growing slowly. There was no clinical or biochemical evidence of hyperprolactinemia. LH and FSH levels were appropriately elevated in the setting of primary ovarian failure. Patient had symptoms suggestive of hypothyroidism and was placed on levothyroxine in the past. Repeated TFT after hormone replacement was discontinued were normal. Mrs. Flores has normal IGF-1 levels and has no signs or symptoms of diabetes insipidus. Recent MRI (November 2017) indicates spontaneous reduction of the tumor size, however ACTH levels remain elevated and the patient failed on dexamethasone suppression test.  We will we will repeat pituitary function studies and obtain a pituitary MRI for comparison.   2. Vitamin D deficiency: Mrs. Flores has a history of bone fracture and low vitamin D levels, along with increased PTH levels. Patient is on vitamin D replacement.  We will repeat calcium metabolism studies.    3.  Osteoporosis: Bone densitometry done in November 2017 indicate osteoporosis (negative T score of 3.5).  The patient has vitamin D deficiency and Cushing's disease.  We will manage her Cushing's disease as above and we will discuss bisphosphonate therapy with the patient as indicated.  Her Cushing disease and premature menopause are likely contributing to her osteoporosis.     Plan was discussed with patient in detail, with the help of an  and the patient's daughter, and she verbalizes understanding.  The patient has previously provided me with her daughter's phone number [phone], [place], and she gave me authorization to discuss any aspect related to her health with her daughter.      Orders Placed This Encounter   Procedures     MRI Brain-PITUITARY  w & w/o contrast     Adrenal corticotropin     Prolactin     Human growth hormone     Insulin Growth Factor 1 by Immunoassay     : Laboratory Miscellaneous Order     : Laboratory Miscellaneous Order     Follicle  stimulating hormone     Lutropin     TSH     T4 free     Hemoglobin A1c     Glucose     25 Hydroxyvitamin D2 and D3     Calcium     Phosphorus     Parathyroid Hormone Intact     Creatinine     Maine James MD PhD    Division of Endocrinology and Diabetes

## 2018-09-05 LAB
ACTH PLAS-MCNC: 242 PG/ML
GH SERPL-MCNC: 0.2 UG/L
IGF-I BLD-MCNC: 140 NG/ML (ref 52–233)

## 2018-09-06 LAB
MACROPROLACTIN SERPL-MCNC: 4.3 NG/ML (ref 2.8–19.5)
MACROPROLACTIN/PROLACTIN MFR SERPL: 65.2 %
PROLACTIN SERPL IA-MCNC: 6.6 NG/ML (ref 2.8–26)

## 2018-09-07 LAB
DEPRECATED CALCIDIOL+CALCIFEROL SERPL-MC: <36 UG/L (ref 20–75)
VITAMIN D2 SERPL-MCNC: <5 UG/L
VITAMIN D3 SERPL-MCNC: 31 UG/L

## 2018-09-09 ENCOUNTER — RADIANT APPOINTMENT (OUTPATIENT)
Dept: MRI IMAGING | Facility: CLINIC | Age: 53
End: 2018-09-09
Attending: INTERNAL MEDICINE
Payer: COMMERCIAL

## 2018-09-09 DIAGNOSIS — E24.0 PITUITARY DEPENDENT CUSHING DISEASE (H): ICD-10-CM

## 2018-09-09 RX ORDER — GADOBUTROL 604.72 MG/ML
7.5 INJECTION INTRAVENOUS ONCE
Status: COMPLETED | OUTPATIENT
Start: 2018-09-09 | End: 2018-09-09

## 2018-09-09 RX ADMIN — GADOBUTROL 7.5 ML: 604.72 INJECTION INTRAVENOUS at 16:01

## 2018-09-09 NOTE — DISCHARGE INSTRUCTIONS
MRI Contrast Discharge Instructions    The IV contrast you received today will pass out of your body in your  urine. This will happen in the next 24 hours. You will not feel this process.  Your urine will not change color.    Drink at least 4 extra glasses of water or juice today (unless your doctor  has restricted your fluids). This reduces the stress on your kidneys.  You may take your regular medicines.    If you are on dialysis: It is best to have dialysis today.    If you have a reaction: Most reactions happen right away. If you have  any new symptoms after leaving the hospital (such as hives or swelling),  call your hospital at the correct number below. Or call your family doctor.  If you have breathing distress or wheezing, call 911.    Special instructions: ***    I have read and understand the above information.    Signature:______________________________________ Date:___________    Staff:__________________________________________ Date:___________     Time:__________    [place] Radiology Departments:    ___[place]: [phone]  ___[facility]: [phone]  ___[place]: [phone] ___[facility]: [phone]  ___[facility]: [phone]  ___[facility]: [phone]  ___[phone]  ___[facility]: [phone]  ___[phone]

## 2018-09-12 LAB — A-PGH SER-MCNC: 0.5 NG/ML

## 2018-09-18 DIAGNOSIS — E24.0 PITUITARY DEPENDENT CUSHING DISEASE (H): Primary | ICD-10-CM

## 2018-09-28 ENCOUNTER — PRE VISIT (OUTPATIENT)
Dept: NEUROSURGERY | Facility: CLINIC | Age: 53
End: 2018-09-28

## 2018-10-08 ENCOUNTER — PATIENT OUTREACH (OUTPATIENT)
Dept: CARE COORDINATION | Facility: CLINIC | Age: 53
End: 2018-10-08

## 2018-10-09 ENCOUNTER — OFFICE VISIT (OUTPATIENT)
Dept: NEUROSURGERY | Facility: CLINIC | Age: 53
End: 2018-10-09
Payer: COMMERCIAL

## 2018-10-09 VITALS
BODY MASS INDEX: 32.06 KG/M2 | SYSTOLIC BLOOD PRESSURE: 113 MMHG | HEART RATE: 65 BPM | OXYGEN SATURATION: 100 % | WEIGHT: 181 LBS | DIASTOLIC BLOOD PRESSURE: 79 MMHG

## 2018-10-09 DIAGNOSIS — E24.0 CUSHING DISEASE (H): Primary | ICD-10-CM

## 2018-10-09 ASSESSMENT — ENCOUNTER SYMPTOMS
MYALGIAS: 1
EYE REDNESS: 1
EYE IRRITATION: 1
ARTHRALGIAS: 0
INCREASED ENERGY: 0
HALLUCINATIONS: 0
SINUS PAIN: 1
WEIGHT GAIN: 0
MUSCLE CRAMPS: 0
BACK PAIN: 1
EYE PAIN: 1
SINUS CONGESTION: 0
JOINT SWELLING: 0
ALTERED TEMPERATURE REGULATION: 0
MUSCLE WEAKNESS: 0
SMELL DISTURBANCE: 0
TASTE DISTURBANCE: 0
STIFFNESS: 0
NECK MASS: 0
TROUBLE SWALLOWING: 0
POLYPHAGIA: 0
EYE WATERING: 1
SORE THROAT: 0
POLYDIPSIA: 0
NIGHT SWEATS: 1
NECK PAIN: 1
FEVER: 1
DECREASED APPETITE: 0
DOUBLE VISION: 1
WEIGHT LOSS: 0
CHILLS: 1
HOARSE VOICE: 0

## 2018-10-09 NOTE — MR AVS SNAPSHOT
After Visit Summary   10/9/2018    Francis Flores    MRN: [id number]           Patient Information     Date Of Birth          1965        Visit Information        Provider Department      10/9/2018 9:45 AM Derrell Crain MD; [employer] [facility] Neurosurgery        Care Instructions    You are scheduled for a Diagnostic Cerebral Angiogram for petrosal sinus sampling on 11/16/18 at 1:00 PM.     Please follow these instructions:    * You should arrive at the [facility] waiting room at the General acute hospital at 11:30 AM. The address is [address] [place] [address]. The phone number is [phone]. A map is enclosed.    * Do not eat after 5:00 AM; you may drink clear liquids (includes water, Jell-O, clear broth, apple juice or any non-carbonated beverage that you can see through) until 11:00 AM.     * You may take your medications with a sip of water the morning of the procedure.     * You will be discharged the same day. You must have a  home and someone that can stay with you through the night.     If you have questions regarding your procedure, please contact me at [phone], option 3.    If you need to cancel, reschedule or have procedure scheduling related questions, please call [phone].    Thank you,  Anibal Varner RN, CNRN  Stroke & Endovascular Care Coordinator                            Follow-ups after your visit        Your next 10 appointments already scheduled     Jan 08, 2019 10:30 AM CST   (Arrive by 10:15 AM)   RETURN ENDOCRINE with SHAYY James MD   [facility] Endocrinology ([facility])    [address] [place] [id number] [phone]              Who to contact     Please call your clinic at [phone] to:    Ask questions about your health    Make or cancel appointments    Discuss your medicines    Learn about your test results    Speak to  your doctor            Additional Information About Your Visit        Verbal[employer] Information     [employer] is an electronic gateway that provides easy, online access to your medical records. With [employer], you can request a clinic appointment, read your test results, renew a prescription or communicate with your care team.     To sign up for [employer] visit the website at [email or web address]   You will be asked to enter the access code listed below, as well as some personal information. Please follow the directions to create your username and password.     Your access code is: [email or web address]  Expires: 2018  6:30 AM     Your access code will  in 90 days. If you need help or a new code, please contact your [facility] or call [phone] for assistance.        Care EveryWhere ID     This is your Care EveryWhere ID. This could be used by other organizations to access your [facility] medical records  [id number]        Your Vitals Were     Pulse Pulse Oximetry BMI (Body Mass Index)             65 100% 32.06 kg/m2          Blood Pressure from Last 3 Encounters:   10/09/18 113/79   18 117/79   18 118/76    Weight from Last 3 Encounters:   10/09/18 82.1 kg (181 lb)   18 80.2 kg (176 lb 12.8 oz)   18 81.6 kg (179 lb 14.4 oz)              Today, you had the following     No orders found for display       Primary Care Provider Office Phone # Fax #    Eliu JORDAN Casatreasure [phone]       [facility] [address] [address]   [facility] [address]        Equal Access to Services     [facility] YOHANA : Hadii aad ku hadasho Soomaali, waaxda luqadaha, qaybta kaalmada adeegyada, holly gonzalez. So [facility] [phone].    ATENCIÓN: Si habla español, tiene a nunez disposición servicios gratuitos de asistencia lingüística. Llame al [phone].    We comply with applicable federal civil rights laws and [place] laws. We do not discriminate on  the basis of race, color, national origin, age, disability, sex, sexual orientation, or gender identity.            Thank you!     Thank you for choosing [facility] NEUROSURGERY  for your care. Our goal is always to provide you with excellent care. Hearing back from our patients is one way we can continue to improve our services. Please take a few minutes to complete the written survey that you may receive in the mail after your visit with us. Thank you!             Your Updated Medication List - Protect others around you: Learn how to safely use, store and throw away your medicines at www.disposemymeds.org.          This list is accurate as of 10/9/18 11:27 AM.  Always use your most recent med list.                   Brand Name Dispense Instructions for use Diagnosis    calcium carbonate 500 mg (elemental) 500 MG tablet    OS-KI     Take 1 tablet by mouth 2 times daily        dexamethasone 1 MG tablet    DECADRON    1 tablet    Take 1 tablet (1 mg) by mouth once for 1 dose Take at night (midnight) and draw blood for cortisol 8 hours later (at 8 AM)    Pituitary macroadenoma (H), Cushing's syndrome (H)       ibuprofen 200 MG capsule     60 capsule    Take 400 mg by mouth every 4 hours as needed for fever    Pituitary adenoma (H)       pantoprazole sodium 40 MG packet    PROTONIX     Take 1 packet by mouth daily.        ranitidine 150 MG tablet    ZANTAC     Take 1 tablet by mouth 2 times daily.        VITAMIN D (CHOLECALCIFEROL) PO      Take 2,000 Units by mouth daily

## 2018-10-09 NOTE — LETTER
10/9/2018       RE: Francis Flores  [address]  [facility] [place] [address]     Dear Colleague,    Thank you for referring your patient, Francis Flores, to the [facility] NEUROSURGERY at [facility]. Please see a copy of my visit note below.    Dear Dr. Stack:    We saw Ms. Francis Flores today in Pituitary Clinic for the first time at Dr. James's request. She is a 53 year old right-handed woman with a history of pituitary macroadenoma diagnosed in 2008.  She was apparently offered resection of the tumor, but due to miscommunication with the surgeon, it was never performed. She has been evaluated elsewhere for Cushing's disease and was apparently told that her tumor had regressed and that she did not have Cushing's disease. Please see Dr. James's detailed note regarding the specifics of her clinical course. Dr. James's workup strongly suggests that she has Cushing's disease.    Currently, her health has been well, but is not as great when she first moved to the . In 2008, she had episodes of disorientation upon standing, and had her first MRI revealing a tumor. When she was told she had a tumor, she was told she would be handicapped and not able to care for her children, which caused her become depressed. She began medication which helped with pain, dizziness. She has trouble now with vision, sleeping, and picking up her feet when walking. She last saw our partner, Dr. Sparks, in 2012 regarding this tumor, at which time she appeared to be asymptomatic.    MEDICAL HISTORY: Pituitary tumor, premature ovarian failure, bone fracture    FAMILY HISTORY: No family history of pituitary disease, asthma, or osteoporosis.    SOCIAL HISTORY: She is  with 8 children. [place]. 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    PHYSICAL EXAM: On exam, her general appearance is good. Extraocular movements  intact. She moves all extremities equally and with good coordination. She does not have any obvious Cushingoid features.    REVIEW OF STUDIES: We reviewed her MRI's dating back to 2008 and her most recent MRI from last month. There is a hypoenhancing tumor in the left side of the sella that extends into the left parasellar space, consistent with the pituitary adenoma, however, we do not see a clear demarcation between the tumor and the pituitary gland. It does appear that the tumor's current size is smaller than a few years ago.    IMPRESSION AND PLAN: Dr. James's workup suggests the patient has Cushing's Disease, even though she does not have an overt presentation. Because the tumor is not well defined on the MRI, we believe that petrosal sinus sampling will be useful. She had never heard of this test previously, and we described it in detail. We were also clear that her tumor is benign but biochemically active. She seems to be a very sharp patient and asked very pointed questions. She agrees to proceed with the petrosal sinus sampling and we will arrange for this within the next few weeks. After reviewing the results we will discuss surgery in more detail.    I appreciate your confidence in involving us in her care. Please do not hesitate to contact us with questions. We will keep you informed of her progress.     I, Ariel Cortes, am serving as a scribe to document services personally performed by Derrell Crain MD, based upon my observations and the provider's statements to me. All documentation has been reviewed by the aforementioned doctor prior to being entered into the official medical record.    I, Derrell Crain, attest that above named individual is acting in scribe capacity, has observed my performance of the services and has documented them in accordance with my direction. The documentation recorded by the scribe accurately reflects the service I personally performed and the decisions made  by me.      Again, thank you for allowing me to participate in the care of your patient.      Sincerely,    Derrell Crain MD

## 2018-10-09 NOTE — PROGRESS NOTES
Dear Dr. Stack:    We saw Ms. Francis Flores today in Pituitary Clinic for the first time at Dr. James's request. She is a 53 year old right-handed woman with a history of pituitary macroadenoma diagnosed in 2008.  She was apparently offered resection of the tumor, but due to miscommunication with the surgeon, it was never performed. She has been evaluated elsewhere for Cushing's disease and was apparently told that her tumor had regressed and that she did not have Cushing's disease. Please see Dr. James's detailed note regarding the specifics of her clinical course. Dr. James's workup strongly suggests that she has Cushing's disease.    Currently, her health has been well, but is not as great when she first moved to the [place]. In 2008, she had episodes of disorientation upon standing, and had her first MRI revealing a tumor. When she was told she had a tumor, she was told she would be handicapped and not able to care for her children, which caused her become depressed. She began medication which helped with pain, dizziness. She has trouble now with vision, sleeping, and picking up her feet when walking. She last saw our partner, Dr. Sparks, in 2012 regarding this tumor, at which time she appeared to be asymptomatic.    MEDICAL HISTORY: Pituitary tumor, premature ovarian failure, bone fracture    FAMILY HISTORY: No family history of pituitary disease, asthma, or osteoporosis.    SOCIAL HISTORY: She is  with 8 children. [place]. 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    PHYSICAL EXAM: On exam, her general appearance is good. Extraocular movements intact. She moves all extremities equally and with good coordination. She does not have any obvious Cushingoid features.    REVIEW OF STUDIES: We reviewed her MRI's dating back to 2008 and her most recent MRI from last month. There is a hypoenhancing tumor in the left side of the sella that  extends into the left parasellar space, consistent with the pituitary adenoma, however, we do not see a clear demarcation between the tumor and the pituitary gland. It does appear that the tumor's current size is smaller than a few years ago.    IMPRESSION AND PLAN: Dr. James's workup suggests the patient has Cushing's Disease, even though she does not have an overt presentation. Because the tumor is not well defined on the MRI, we believe that petrosal sinus sampling will be useful. She had never heard of this test previously, and we described it in detail. We were also clear that her tumor is benign but biochemically active. She seems to be a very sharp patient and asked very pointed questions. She agrees to proceed with the petrosal sinus sampling and we will arrange for this within the next few weeks. After reviewing the results we will discuss surgery in more detail.    I appreciate your confidence in involving us in her care. Please do not hesitate to contact us with questions. We will keep you informed of her progress.     CANDIDA BAEZ MD    I, Ariel Cortes, am serving as a scribe to document services personally performed by Candida Baez MD, based upon my observations and the provider's statements to me. All documentation has been reviewed by the aforementioned doctor prior to being entered into the official medical record.    I, Candida Baez, attest that above named individual is acting in scribe capacity, has observed my performance of the services and has documented them in accordance with my direction. The documentation recorded by the scribe accurately reflects the service I personally performed and the decisions made by me.

## 2018-10-09 NOTE — NURSING NOTE
Chief Complaint   Patient presents with     Consult     CUSHING'S DISEASE       Preventive Care:    Colorectal Cancer Screening: During our visit today, we discussed that it is recommended you receive colorectal cancer screening. Please call or make an appointment with your primary care provider to discuss this. You may also call the  [facility] scheduling line ([phone]) to set up a colonoscopy appointment.      Domenica Connelly MA

## 2018-10-09 NOTE — PATIENT INSTRUCTIONS
You are scheduled for a Diagnostic Cerebral Angiogram for petrosal sinus sampling on 11/16/18 at 1:00 PM.     Please follow these instructions:    * You should arrive at the Gold waiting room at the [facility] at 11:30 AM. The address is [address] [place] [address]. The phone number is [phone]. A map is enclosed.    * Do not eat after 5:00 AM; you may drink clear liquids (includes water, Jell-O, clear broth, apple juice or any non-carbonated beverage that you can see through) until 11:00 AM.     * You may take your medications with a sip of water the morning of the procedure.     * You will be discharged the same day. You must have a  home and someone that can stay with you through the night.     If you have questions regarding your procedure, please contact me at [phone], option [phone].    If you need to cancel, reschedule or have procedure scheduling related questions, please call [phone].    Thank you,  Anibal Varner RN, CNRN  Stroke & Endovascular Care Coordinator

## 2018-10-09 NOTE — LETTER
10/9/2018      RE: Francis Flores  [address]  [facility] [address]       Dear Dr. Stack:    We saw Ms. Francis Flores today in Pituitary Clinic for the first time at Dr. James's request. She is a 53 year old right-handed woman with a history of pituitary macroadenoma diagnosed in 2008.  She was apparently offered resection of the tumor, but due to miscommunication with the surgeon, it was never performed. She has been evaluated elsewhere for Cushing's disease and was apparently told that her tumor had regressed and that she did not have Cushing's disease. Please see Dr. James's detailed note regarding the specifics of her clinical course. Dr. James's workup strongly suggests that she has Cushing's disease.    Currently, her health has been well, but is not as great when she first moved [place] the [place]. In 2008, she had episodes of disorientation upon standing, and had her first MRI revealing a tumor. When she was told she had a tumor, she was told she would be handicapped and not able to care for her children, which caused her become depressed. She began medication which helped with pain, dizziness. She has trouble now with vision, sleeping, and picking up her feet when walking. She last saw our partner, Dr. Sparks, in 2012 regarding this tumor, at which time she appeared to be asymptomatic.    MEDICAL HISTORY: Pituitary tumor, premature ovarian failure, bone fracture    FAMILY HISTORY: No family history of pituitary disease, asthma, or osteoporosis.    SOCIAL HISTORY: She is  with 8 children. [place]. 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    PHYSICAL EXAM: On exam, her general appearance is good. Extraocular movements intact. She moves all extremities equally and with good coordination. She does not have any obvious Cushingoid features.    REVIEW OF STUDIES: We reviewed her MRI's dating back to 2008 and her most recent  MRI from last month. There is a hypoenhancing tumor in the left side of the sella that extends into the left parasellar space, consistent with the pituitary adenoma, however, we do not see a clear demarcation between the tumor and the pituitary gland. It does appear that the tumor's current size is smaller than a few years ago.    IMPRESSION AND PLAN: Dr. James's workup suggests the patient has Cushing's Disease, even though she does not have an overt presentation. Because the tumor is not well defined on the MRI, we believe that petrosal sinus sampling will be useful. She had never heard of this test previously, and we described it in detail. We were also clear that her tumor is benign but biochemically active. She seems to be a very sharp patient and asked very pointed questions. She agrees to proceed with the petrosal sinus sampling and we will arrange for this within the next few weeks. After reviewing the results we will discuss surgery in more detail.    I appreciate your confidence in involving us in her care. Please do not hesitate to contact us with questions. We will keep you informed of her progress.       I, Ariel Cortes, am serving as a scribe to document services personally performed by Derrell Crain MD, based upon my observations and the provider's statements to me. All documentation has been reviewed by the aforementioned doctor prior to being entered into the official medical record.    I, Derrell Crain, attest that above named individual is acting in scribe capacity, has observed my performance of the services and has documented them in accordance with my direction. The documentation recorded by the scribe accurately reflects the service I personally performed and the decisions made by me.      Derrell Crain MD

## 2018-10-26 ENCOUNTER — PATIENT OUTREACH (OUTPATIENT)
Dept: NEUROLOGY | Facility: CLINIC | Age: 53
End: 2018-10-26

## 2018-11-07 NOTE — PATIENT INSTRUCTIONS
You are scheduled for a Diagnostic Cerebral Angiogram for Petrosal Sinus Sampling on 11/16/18 at 1:00 PM.     Please follow these instructions:    * You should arrive at the Gold waiting room at the [facility] at 11:30 AM . The address is [address] [place] [address]. The phone number is [phone]. A map is enclosed.    * Do not eat after 3:00 AM; you may drink clear liquids (includes water, Jell-O, clear broth, apple juice or any non-carbonated beverage that you can see through) until 11:00 AM.     * You may take your medications with a sip of water the morning of the procedure.     * You will be discharged the same day. You must have a  home and someone that can stay with you through the night.     If you have questions regarding your procedure, please contact me at [phone], option [phone].    If you need to cancel, reschedule or have procedure scheduling related questions, please call [phone].    Thank you,  Anibal Varner RN, CNRN  Stroke & Endovascular Care Coordinator

## 2018-11-16 ENCOUNTER — APPOINTMENT (OUTPATIENT)
Dept: INTERVENTIONAL RADIOLOGY/VASCULAR | Facility: CLINIC | Age: 53
End: 2018-11-16
Attending: NEUROLOGICAL SURGERY
Payer: COMMERCIAL

## 2018-11-16 ENCOUNTER — APPOINTMENT (OUTPATIENT)
Dept: MEDSURG UNIT | Facility: CLINIC | Age: 53
End: 2018-11-16
Attending: NEUROLOGICAL SURGERY
Payer: COMMERCIAL

## 2018-11-16 ENCOUNTER — HOSPITAL ENCOUNTER (OUTPATIENT)
Facility: CLINIC | Age: 53
Discharge: HOME | End: 2018-11-16
Attending: NEUROLOGICAL SURGERY | Admitting: NEUROLOGICAL SURGERY
Payer: COMMERCIAL

## 2018-11-16 VITALS
OXYGEN SATURATION: 97 % | HEART RATE: 71 BPM | HEIGHT: 63 IN | TEMPERATURE: 98 F | DIASTOLIC BLOOD PRESSURE: 64 MMHG | WEIGHT: 181 LBS | RESPIRATION RATE: 17 BRPM | BODY MASS INDEX: 32.07 KG/M2 | SYSTOLIC BLOOD PRESSURE: 106 MMHG

## 2018-11-16 DIAGNOSIS — E24.0 CUSHING DISEASE (H): ICD-10-CM

## 2018-11-16 LAB
ACTH PLAS-MCNC: 112 PG/ML
ACTH PLAS-MCNC: 121 PG/ML
ACTH PLAS-MCNC: 129 PG/ML
ACTH PLAS-MCNC: 129 PG/ML
ACTH PLAS-MCNC: 166 PG/ML
ACTH PLAS-MCNC: 232 PG/ML
ACTH PLAS-MCNC: 243 PG/ML
ACTH PLAS-MCNC: 258 PG/ML
ACTH PLAS-MCNC: 322 PG/ML
ACTH PLAS-MCNC: 380 PG/ML
ACTH PLAS-MCNC: 407 PG/ML
ACTH PLAS-MCNC: 647 PG/ML
ACTH PLAS-MCNC: 808 PG/ML
ACTH PLAS-MCNC: >1250 PG/ML
APTT PPP: 23 SEC (ref 22–37)
B-HCG SERPL-ACNC: 4 IU/L (ref 0–5)
CREAT SERPL-MCNC: 0.74 MG/DL (ref 0.52–1.04)
ERYTHROCYTE [DISTWIDTH] IN BLOOD BY AUTOMATED COUNT: 14.9 % (ref 10–15)
GFR SERPL CREATININE-BSD FRML MDRD: 82 ML/MIN/1.7M2
HCT VFR BLD AUTO: 46.8 % (ref 35–47)
HGB BLD-MCNC: 14.7 G/DL (ref 11.7–15.7)
INR PPP: 0.98 (ref 0.86–1.14)
MCH RBC QN AUTO: 29.1 PG (ref 26.5–33)
MCHC RBC AUTO-ENTMCNC: 31.4 G/DL (ref 31.5–36.5)
MCV RBC AUTO: 93 FL (ref 78–100)
PLATELET # BLD AUTO: 303 10E9/L (ref 150–450)
RBC # BLD AUTO: 5.06 10E12/L (ref 3.8–5.2)
WBC # BLD AUTO: 7.4 10E9/L (ref 4–11)

## 2018-11-16 PROCEDURE — 27210889 ZZH ACCESSORY CR8

## 2018-11-16 PROCEDURE — 82565 ASSAY OF CREATININE: CPT | Performed by: STUDENT IN AN ORGANIZED HEALTH CARE EDUCATION/TRAINING PROGRAM

## 2018-11-16 PROCEDURE — 25000128 H RX IP 250 OP 636: Performed by: STUDENT IN AN ORGANIZED HEALTH CARE EDUCATION/TRAINING PROGRAM

## 2018-11-16 PROCEDURE — 25500064 ZZH RX 255 OP 636: Performed by: NEUROLOGICAL SURGERY

## 2018-11-16 PROCEDURE — C1887 CATHETER, GUIDING: HCPCS

## 2018-11-16 PROCEDURE — 82024 ASSAY OF ACTH: CPT | Performed by: NEUROLOGICAL SURGERY

## 2018-11-16 PROCEDURE — 27210905 ZZH KIT CR7

## 2018-11-16 PROCEDURE — 27210738 ZZH ACCESSORY CR2

## 2018-11-16 PROCEDURE — 84702 CHORIONIC GONADOTROPIN TEST: CPT | Performed by: STUDENT IN AN ORGANIZED HEALTH CARE EDUCATION/TRAINING PROGRAM

## 2018-11-16 PROCEDURE — 27210732 ZZH ACCESSORY CR1

## 2018-11-16 PROCEDURE — C1769 GUIDE WIRE: HCPCS

## 2018-11-16 PROCEDURE — 27210802 ZZH SHEATH CR1

## 2018-11-16 PROCEDURE — 25000125 ZZHC RX 250: Performed by: NEUROLOGICAL SURGERY

## 2018-11-16 PROCEDURE — 85730 THROMBOPLASTIN TIME PARTIAL: CPT | Performed by: STUDENT IN AN ORGANIZED HEALTH CARE EDUCATION/TRAINING PROGRAM

## 2018-11-16 PROCEDURE — C1760 CLOSURE DEV, VASC: HCPCS

## 2018-11-16 PROCEDURE — 75860 VEIN X-RAY NECK: CPT

## 2018-11-16 PROCEDURE — 85610 PROTHROMBIN TIME: CPT | Performed by: STUDENT IN AN ORGANIZED HEALTH CARE EDUCATION/TRAINING PROGRAM

## 2018-11-16 PROCEDURE — 25000125 ZZHC RX 250: Performed by: STUDENT IN AN ORGANIZED HEALTH CARE EDUCATION/TRAINING PROGRAM

## 2018-11-16 PROCEDURE — 85027 COMPLETE CBC AUTOMATED: CPT | Performed by: STUDENT IN AN ORGANIZED HEALTH CARE EDUCATION/TRAINING PROGRAM

## 2018-11-16 PROCEDURE — 99152 MOD SED SAME PHYS/QHP 5/>YRS: CPT

## 2018-11-16 PROCEDURE — 25000128 H RX IP 250 OP 636: Performed by: PSYCHIATRY & NEUROLOGY

## 2018-11-16 PROCEDURE — 36012 PLACE CATHETER IN VEIN: CPT | Mod: 50

## 2018-11-16 PROCEDURE — 27210804 ZZH SHEATH CR3

## 2018-11-16 PROCEDURE — 40000172 ZZH STATISTIC PROCEDURE PREP ONLY

## 2018-11-16 PROCEDURE — 99153 MOD SED SAME PHYS/QHP EA: CPT

## 2018-11-16 RX ORDER — HEPARIN SODIUM 1000 [USP'U]/ML
8000 INJECTION, SOLUTION INTRAVENOUS; SUBCUTANEOUS
Status: COMPLETED | OUTPATIENT
Start: 2018-11-16 | End: 2018-11-16

## 2018-11-16 RX ORDER — DEXTROSE MONOHYDRATE 25 G/50ML
25-50 INJECTION, SOLUTION INTRAVENOUS
Status: DISCONTINUED | OUTPATIENT
Start: 2018-11-16 | End: 2018-11-16 | Stop reason: HOSPADM

## 2018-11-16 RX ORDER — NICOTINE POLACRILEX 4 MG
15-30 LOZENGE BUCCAL
Status: DISCONTINUED | OUTPATIENT
Start: 2018-11-16 | End: 2018-11-16 | Stop reason: HOSPADM

## 2018-11-16 RX ORDER — HEPARIN SODIUM 1000 [USP'U]/ML
1000 INJECTION, SOLUTION INTRAVENOUS; SUBCUTANEOUS
Status: COMPLETED | OUTPATIENT
Start: 2018-11-16 | End: 2018-11-16

## 2018-11-16 RX ORDER — SODIUM CHLORIDE 9 MG/ML
INJECTION, SOLUTION INTRAVENOUS CONTINUOUS
Status: DISCONTINUED | OUTPATIENT
Start: 2018-11-16 | End: 2018-11-16 | Stop reason: HOSPADM

## 2018-11-16 RX ORDER — IODIXANOL 320 MG/ML
150 INJECTION, SOLUTION INTRAVASCULAR ONCE
Status: COMPLETED | OUTPATIENT
Start: 2018-11-16 | End: 2018-11-16

## 2018-11-16 RX ORDER — NALOXONE HYDROCHLORIDE 0.4 MG/ML
.1-.4 INJECTION, SOLUTION INTRAMUSCULAR; INTRAVENOUS; SUBCUTANEOUS
Status: DISCONTINUED | OUTPATIENT
Start: 2018-11-16 | End: 2018-11-16 | Stop reason: HOSPADM

## 2018-11-16 RX ORDER — DIPHENHYDRAMINE HCL 25 MG
50 TABLET ORAL
Qty: 2 TABLET | Refills: 0 | Status: SHIPPED | OUTPATIENT
Start: 2018-11-16 | End: 2019-03-06

## 2018-11-16 RX ORDER — FENTANYL CITRATE 50 UG/ML
25-50 INJECTION, SOLUTION INTRAMUSCULAR; INTRAVENOUS EVERY 5 MIN PRN
Status: DISCONTINUED | OUTPATIENT
Start: 2018-11-16 | End: 2018-11-16 | Stop reason: HOSPADM

## 2018-11-16 RX ORDER — FLUMAZENIL 0.1 MG/ML
0.2 INJECTION, SOLUTION INTRAVENOUS
Status: DISCONTINUED | OUTPATIENT
Start: 2018-11-16 | End: 2018-11-16 | Stop reason: HOSPADM

## 2018-11-16 RX ORDER — LIDOCAINE 40 MG/G
CREAM TOPICAL
Status: DISCONTINUED | OUTPATIENT
Start: 2018-11-16 | End: 2018-11-16 | Stop reason: HOSPADM

## 2018-11-16 RX ADMIN — FENTANYL CITRATE 50 MCG: 50 INJECTION, SOLUTION INTRAMUSCULAR; INTRAVENOUS at 16:07

## 2018-11-16 RX ADMIN — HEPARIN SODIUM 5000 UNITS: 1000 INJECTION, SOLUTION INTRAVENOUS; SUBCUTANEOUS at 14:49

## 2018-11-16 RX ADMIN — SODIUM CHLORIDE: 9 INJECTION, SOLUTION INTRAVENOUS at 12:32

## 2018-11-16 RX ADMIN — LIDOCAINE HYDROCHLORIDE 20 ML: 10 INJECTION, SOLUTION EPIDURAL; INFILTRATION; INTRACAUDAL; PERINEURAL at 14:49

## 2018-11-16 RX ADMIN — MIDAZOLAM 0.5 MG: 1 INJECTION INTRAMUSCULAR; INTRAVENOUS at 14:30

## 2018-11-16 RX ADMIN — CORTICORELIN OVINE TRIFLUTATE 82 MCG: 100 INJECTION, POWDER, LYOPHILIZED, FOR SOLUTION INTRAVENOUS at 15:38

## 2018-11-16 RX ADMIN — FENTANYL CITRATE 25 MCG: 50 INJECTION, SOLUTION INTRAMUSCULAR; INTRAVENOUS at 14:30

## 2018-11-16 RX ADMIN — HEPARIN SODIUM 1000 UNITS: 1000 INJECTION, SOLUTION INTRAVENOUS; SUBCUTANEOUS at 15:40

## 2018-11-16 RX ADMIN — IODIXANOL 15 ML: 320 INJECTION, SOLUTION INTRAVASCULAR at 16:12

## 2018-11-16 RX ADMIN — HEPARIN SODIUM 8000 UNITS: 1000 INJECTION, SOLUTION INTRAVENOUS; SUBCUTANEOUS at 14:51

## 2018-11-16 NOTE — PROGRESS NOTES
Interventional Radiology Intra-procedural Nursing Note    Patient Name: Francis Flores  Medical Record Number: [id number]  Today's Date: November 16, 2018    Start Time: 1440  End of procedure time: 1610  Procedure: Petrosol sinus sampling  Fire Safety Score: 2    Consent Review/Timeout Performed by: Dr. Pratik Perry/ Dr. Ngo Male/ Dr. Crain  Procedure Performed By: Dr. Huber Nicolas / Dr. Pratik Perry    Fentanyl administered: 75 mcg  Versed administered: 0.5 mg  Heparin IV administered: 8000 units at 1450  Heparin administered: 1000 units at 1540    Start of Sedation Time: 1430  End of Sedation Time: 1610  Total Sedation Time: 100 minutes    Procedure start time: 1440  Puncture time: 1440  Procedure end time: 1610    Report given to: Wendi Ruiz pt departs:  1615  : Roni    Other Notes:  Alert non-english speaking female transported via cart from  to IR Procedure Room 3 for planned intervention.  ID band confirmed and patient acknowledges understanding of planned procedure. Patient repositioned to procedure table via hover-mat and positioned supine.  Patient prepped and draped per policy see VS flowsheet, MAR for further information.   Pre-procedure neuro exam obtained via  translation.     Prior to procedure, distal pulses were identified and marked via palpation +2, +CMS bilaterally.    LFV identified under image guidance.  Sheath in place at 1441.   RFV identified under image guidance.  Sheath in place at 1450.     Samples obtained per protocol and sent to lab for evalaution.     Bilateral introducer removed at 1600  Manual pressure held for 10 minutes.  Bilateral venous closure device deployed at 1605.  Groin site appearance is D/I.  Bilateral groin site is cleansed and dressed per protocol.     Per MD, bedrest for 2 Hours.      Patient condition post procedure is stable.   Patient returned to  for post-procedure monitoring.    Sarah Rocha RN

## 2018-11-16 NOTE — PROGRESS NOTES
Patient is ready for the petrosal sinus sampling.  is with her. She seems to have good knowledge of this procedure. Baseline Neuro exam done. Consent signed.

## 2018-11-16 NOTE — PROCEDURES
[facility]     Endovascular Surgical Neuroradiology Post-Procedure Note    Pre-Procedure Diagnosis:  Cushing syndrome  Post-Procedure Diagnosis:  Same    Procedure(s):   Inferior petrosal sinus sampling    Findings:  S/p successful bilateral inferior petrosal sinus sampling    Plan: 2 hours bedrest then discharge home.    Primary Surgeon:  Dr. Derrell Crain  Secondary Surgeon:  Not applicable  Secondary Surgeon Review:  None  Fellow:  Male  Additional Assistants:  None    Prior to the start of the procedure and with procedural staff participation, I verbally confirmed: the patient s identity using two indicators, relevant allergies, that the procedure was appropriate and matched the consent or emergent situation, and that the correct equipment/implants were available. Immediately prior to starting the procedure I conducted the Time Out with the procedural staff and re-confirmed the patient s name, procedure, and site/side. (The Joint Commission universal protocol was followed.)  Yes    PRU value: Not applicable    Anesthesia:  Conscious Sedation  Medications:  Fentanyl, Heparin, Midazolam 75mcg, 9000U and 0.5mg  Puncture site:  Bilateral femoral veins    Fluoroscopy time (minutes):  25.8  Radiation dose (mGy):  190    Contrast amount (mL):  15     Estimated blood loss (mL):  Minimal    Closure:  Device - B/L starclose    Disposition:  Home after recovery.        Sedation Post-Procedure Summary    Sedatives: Fentanyl and Midazolam (Versed) 75, 0.5    Vital signs and pulse oximetry were monitored and remained stable throughout the procedure, and sedation was maintained until the procedure was complete.  The patient was monitored by staff until sedation discharge criteria were met.    Patient tolerance:  Patient tolerated the procedure well with no immediate complications.    Time of sedation in minutes: 100 minutes from beginning to end of physician one to one  monitoring.    Huber HICKS Male  Pager:  [phone]

## 2018-11-16 NOTE — IP AVS SNAPSHOT
[address] [address]                                       After Visit Summary   11/16/2018    Francis Flores    MRN: [id number]           After Visit Summary Signature Page     I have received my discharge instructions, and my questions have been answered. I have discussed any challenges I see with this plan with the nurse or doctor.    ..........................................................................................................................................  Patient/Patient Representative Signature      ..........................................................................................................................................  Patient Representative Print Name and Relationship to Patient    ..................................................               ................................................  Date                                   Time    ..........................................................................................................................................  Reviewed by Signature/Title    ...................................................              ..............................................  Date                                               Time          22EPIC Rev 08/18

## 2018-11-16 NOTE — H&P
[facility], [place]     Endovascular Surgical Neuroradiology Pre-Procedure Note      HPI:  She is a 53 year old right-handed woman with a history of pituitary macroadenoma diagnosed in 2008.  She was apparently offered resection of the tumor, but due to miscommunication with the surgeon, it was never performed. She has been evaluated elsewhere for Cushing's disease and was apparently told that her tumor had regressed and that she did not have Cushing's disease. Please see Dr. James's detailed note regarding the specifics of her clinical course. Dr. James's workup strongly suggests that she has Cushing's disease.     Currently, her health has been well, but is not as great when she first moved to the . In 2008, she had episodes of disorientation upon standing, and had her first MRI revealing a tumor. When she was told she had a tumor, she was told she would be handicapped and not able to care for her children, which caused her become depressed. She began medication which helped with pain, dizziness. She has trouble now with vision, sleeping, and picking up her feet when walking. She last saw our partner, Dr. Sparks, in 2012 regarding this tumor, at which time she appeared to be asymptomatic.    Because the tumor is not well defined on the MRI, we believe that petrosal sinus sampling will be useful.       Medical History:  History reviewed. No pertinent past medical history.    Surgical History:  Past Surgical History:   Procedure Laterality Date     ORTHOPEDIC SURGERY      knee       Family History:  Family History   Problem Relation Age of Onset     Glaucoma No family hx of      Macular Degeneration No family hx of        Social History:  Social History     Social History     Marital status: Unknown     Spouse name: N/A     Number of children: N/A     Years of education: N/A     Occupational History     Not on file.     Social History Main Topics     Smoking status: Never  Smoker     Smokeless tobacco: Never Used     Alcohol use Not on file     Drug use: Not on file     Sexual activity: Not on file     Other Topics Concern     Not on file     Social History Narrative       Allergies:  No Known Allergies    Is there a contrast allergy?  No    Medications:  Prescriptions Prior to Admission   Medication Sig Dispense Refill Last Dose     calcium carbonate (OS- MG [place]. CA) 500 tablet Take 1 tablet by mouth 2 times daily   11/15/2018 at 0800     pantoprazole sodium 40 MG tablet Take 1 packet by mouth daily.   11/15/2018 at 0800     ranitidine (ZANTAC) 150 MG tablet Take 1 tablet by mouth 2 times daily.   11/15/2018 at 0800     VITAMIN D, CHOLECALCIFEROL, PO Take 2,000 Units by mouth daily   11/15/2018 at 0800     dexamethasone (DECADRON) 1 MG tablet Take 1 tablet (1 mg) by mouth once for 1 dose Take at night (midnight) and draw blood for cortisol 8 hours later (at 8 AM) 1 tablet 0      ibuprofen 200 MG capsule Take 400 mg by mouth every 4 hours as needed for fever 60 capsule 3 Unknown at Unknown time   .    ROS:  The 5 point Review of Systems is negative other than noted in the HPI or here.     PHYSICAL EXAMINATION  Vital Signs:  B/P: 125/81,  T: 98,  P: 74,  R: 20    Cardio:  RRR  Pulmonary:  no respiratory distress  Abdomen:  soft    Alert, oriented to time place and person.  Speech fluent with normal naming, repetition, comprehension. Pupils are equal, round, reactive to light.  Extraocular movements full.  Visual fields full.  Facial sensation, movement normal.  Palate moves symmetrically.  Tongue midline.  Sternocleidomastoid and trapezius strength intact.  Motor 5/5 in all four extremitis, sensation intact to touch bilaterally without any neglect.  No dysmetria noted.  Gait deferred.     Pre-procedure National Institutes of Health Stroke Scale:   Not applicable    LABS  (most recent Cr, BUN, GFR, PLT, INR, PTT within the past 7 days):    Recent Labs  Lab 11/16/18  1200   CR  0.74   GFRESTIMATED 82   GFRESTBLACK >90      INR 0.98   PTT 23        Platelet Function P2Y12 (PRU):  Not applicable      ASSESSMENT: Cushing's syndrome/ pituitary mass evaluation     PLAN: Petrosal sinus sampling         PRE-PROCEDURE SEDATION ASSESSMENT     Pre-Procedure Sedation Assessment done at 1230.    Expected Level:  Moderate Sedation    Indication:  Sedation is required to allow for neurointerventional procedure.    Consent obtained from patient after discussing the risks, benefits and alternatives.     PO Intake:  Appropriately NPO for procedure    ASA Class:  Class 2 - MILD SYSTEMIC DISEASE, NO ACUTE PROBLEMS, NO FUNCTIONAL LIMITATIONS.    Mallampati:  Grade 2:  Soft palate, base of uvula, tonsillar pillars, and portion of posterior pharyngeal wall visible    History and physical reviewed and no updates needed. I have reviewed the lab findings, diagnostic data, medications, and the plan for sedation. I have determined this patient to be an appropriate candidate for the planned sedation and procedure and have reassessed the patient IMMEDIATELY PRIOR to sedation and procedure.    Patient was discussed with the Attending, Dr. Crain , who agrees with the plan.    Pratik Perry   Pager: [phone]      I have reviewed the history. I have seen and examined the patient myself and agree with the assessment and plan above.  RODRIGUEZ Crain MD

## 2018-11-16 NOTE — IP AVS SNAPSHOT
MRN:[id number]                      After Visit Summary   11/16/2018    Francis Flores    MRN: [id number]           Visit Information        Department      11/16/2018 11:18 AM [address] [facility]          Review of your medicines      START taking        Dose / Directions    diphenhydrAMINE 25 MG tablet   Commonly known as:  BENADRYL   Used for:  Cushing disease (H)        Dose:  50 mg   Take 2 tablets (50 mg) by mouth nightly as needed for itching, allergies or sleep   Quantity:  2 tablet   Refills:  0         CONTINUE these medicines which have NOT CHANGED        Dose / Directions    calcium carbonate 500 mg (elemental) 500 MG tablet   Commonly known as:  OS-KI        Dose:  1 tablet   Take 1 tablet by mouth 2 times daily   Refills:  0       dexamethasone 1 MG tablet   Commonly known as:  DECADRON   Used for:  Pituitary macroadenoma (H), Cushing's syndrome (H)        Dose:  1 mg   Take 1 tablet (1 mg) by mouth once for 1 dose Take at night (midnight) and draw blood for cortisol 8 hours later (at 8 AM)   Quantity:  1 tablet   Refills:  0       ibuprofen 200 MG capsule   Commonly known as:  ADVIL/MOTRIN   Used for:  Pituitary adenoma (H)        Dose:  400 mg   Take 400 mg by mouth every 4 hours as needed for fever   Quantity:  60 capsule   Refills:  3       pantoprazole sodium 40 MG packet   Commonly known as:  PROTONIX        Dose:  1 packet   Take 1 packet by mouth daily.   Refills:  0       ranitidine 150 MG tablet   Commonly known as:  ZANTAC        Dose:  1 tablet   Take 1 tablet by mouth 2 times daily.   Refills:  0       VITAMIN D (CHOLECALCIFEROL) PO        Dose:  2000 Units   Take 2,000 Units by mouth daily   Refills:  0            Where to get your medicines      Some of these will need a paper prescription and others can be bought over the counter. Ask your nurse if you have questions.     Bring a paper prescription for each of these medications     diphenhydrAMINE 25 MG tablet                Prescriptions were sent or printed at these locations (1 Prescription)                   Other Prescriptions                Printed at Department/Unit printer (1 of 1)         diphenhydrAMINE (BENADRYL) 25 MG tablet                 Protect others around you: Learn how to safely use, store and throw away your medicines at www.disposemymeds.org.         Follow-ups after your visit        Your next 10 appointments already scheduled     Jan 08, 2019 10:30 AM CST   (Arrive by 10:15 AM)   RETURN ENDOCRINE with SHAYY James MD   [facility] ([facility])    [address] [place] [id number] [phone]               Care Instructions        Further instructions from your care team       [facility]         Interventional Radiology  Discharge Instructions Post Angiogram (NEURO)    AFTER YOU GO HOME  ? DO NOT drive or operate machinery at home or at work for at least 24 hours  ? If you were given sedation; we recommend that an adult stay with you for the first 24 hours  ? DO relax and take it easy for 24 hours  ? DO drink plenty of fluids  ? DO resume your regular diet, unless otherwise instructed by your Primary Physician  ? DO NOT SMOKE FOR AT LEAST 24 HOURS, if you have been given any medications that were to help you relax or sedate you during your procedure  ? DO NOT drink alcoholic beverages the day of your procedure  ? DO NOT do any strenuous exercise or lifting for at least 2 days following your procedure  ? DO NOT take a bath or shower for at least 12 hours following your procedure  ? DO NOT scrub the procedure site vigorously for 5 days  ? DO NOT make any important or legal decisions for 24 hours following your procedure if you were given sedation    CALL THE PHYSICIAN IF:  ? You start bleeding from the procedure site.  If you do start to bleed from that site, lie down flat and hold pressure on the site.   "Your physician will tell you if you need to return to the hospital.  It is common to have a small bruise or lump at the site  ? You have numbness, coolness or change in color of the arm or leg of the puncture site  ? You experience changes in your vision, hearing, balance, coordination, speech, thinking or memory  ? You experience weakness in one or more extremity  ? You experience pain or redness at the puncture site  ? You develop nausea or vomiting  ? You develop hives or a rash or unexplained itching  ? You develop a temperature of 101 degrees F or greater    ADDITIONAL INSTRUCTIONS  ? Support the puncture site for coughing, sneezing, or moving your bowels for the first 48 hours  ? No tub bath, hot tubs, or swimming for 5 days  ? No lotion or powder to the puncture site for 3 days  ? Star Closure on both femoral veins    [facility] INTERVENTIONAL RADIOLOGY DEPARTMENT  Procedure Physician: TRISHA Rodriges MD/ KURT Nicolas MD/ZACH Perry MD  Date of procedure: November 16, 2018  Telephone Numbers: [phone] Monday-Friday 8:00 am to 4:30 pm  [phone] After 4:30 pm Monday-Friday, Weekends & Holidays.   Ask for the Neuro-Radiologist on call.  Someone is on call 24 hrs/day  [facility] toll free number: [phone] Monday-Friday 8:00 am to 4:30 pm  [facility] Emergency Dept: [phone]         Additional Information About Your Visit        MyChart Information     [employer] lets you send messages to your doctor, view your test results, renew your prescriptions, schedule appointments and more. To sign up, go to [email or web address] . Click on \"Log in\" on the left side of the screen, which will take you to the Welcome page. Then click on \"Sign up Now\" on the right side of the page.     You will be asked to enter the access code listed below, as well as some personal information. Please follow the directions to create your username and password.     Your access code is: [email or web address]  Expires: 11/19/2018  5:30 AM     Your access code " "will  in 90 days. If you need help or a new code, please call your [facility] clinic or [phone].        Care EveryWhere ID     This is your Care EveryWhere ID. This could be used by other organizations to access your [facility] medical records  [id number]        Your Vitals Were     Blood Pressure Pulse Temperature Respirations Height Weight    128/80 (BP Location: Right arm) 68 98  F (36.7  C) (Oral) 16 1.6 m (5' 3\") 82.1 kg (181 lb)    Pulse Oximetry BMI (Body Mass Index)                98% 32.06 kg/m2           Primary Care Provider Office Phone # Fax #    Eliu Dewey [phone]      Equal Access to Services     ELYSSA MULLER : Hadii caleb morgano Socourtney, waaxda luqadaha, qaybta kaalmada adeegyada, holly foley . So [facility] [phone].    ATENCIÓN: Si habla español, tiene a nunez disposición servicios gratuitos de asistencia lingüística. Llame al [phone].    We comply with applicable federal civil rights laws and [place] laws. We do not discriminate on the basis of race, color, national origin, age, disability, sex, sexual orientation, or gender identity.            Thank you!     Thank you for choosing [facility] for your care. Our goal is always to provide you with excellent care. Hearing back from our patients is one way we can continue to improve our services. Please take a few minutes to complete the written survey that you may receive in the mail after you visit with us. Thank you!             Medication List: This is a list of all your medications and when to take them. Check marks below indicate your daily home schedule. Keep this list as a reference.      Medications           Morning Afternoon Evening Bedtime As Needed    calcium carbonate 500 mg (elemental) 500 MG tablet   Commonly known as:  OS-KI   Take 1 tablet by mouth 2 times daily                                dexamethasone 1 MG tablet   Commonly known as:  DECADRON   Take 1 " tablet (1 mg) by mouth once for 1 dose Take at night (midnight) and draw blood for cortisol 8 hours later (at 8 AM)                                diphenhydrAMINE 25 MG tablet   Commonly known as:  BENADRYL   Take 2 tablets (50 mg) by mouth nightly as needed for itching, allergies or sleep                                ibuprofen 200 MG capsule   Commonly known as:  ADVIL/MOTRIN   Take 400 mg by mouth every 4 hours as needed for fever                                pantoprazole sodium 40 MG packet   Commonly known as:  PROTONIX   Take 1 packet by mouth daily.                                ranitidine 150 MG tablet   Commonly known as:  ZANTAC   Take 1 tablet by mouth 2 times daily.                                VITAMIN D (CHOLECALCIFEROL) PO   Take 2,000 Units by mouth daily

## 2018-11-17 LAB
ACTH PLAS-MCNC: 384 PG/ML
ACTH PLAS-MCNC: 395 PG/ML
ACTH PLAS-MCNC: 402 PG/ML
ACTH PLAS-MCNC: 465 PG/ML
ACTH PLAS-MCNC: >1250 PG/ML

## 2018-11-17 NOTE — PROGRESS NOTES
Patient tolerated recovery stage well. VSS, RFV and LFV  site clean/dry/intact, no hematoma, and denies pain. Patient tolerated PO food and fluids. Teaching was done and discharge instructions were given with th e assistance of an  and her so. Patient ambulated, voided, and PIV was removed. Patient discharged from the hospital via wheel chair to home with son

## 2018-11-28 ENCOUNTER — TELEPHONE (OUTPATIENT)
Dept: NEUROSURGERY | Facility: CLINIC | Age: 53
End: 2018-11-28

## 2018-11-28 NOTE — TELEPHONE ENCOUNTER
Daughter calling Dr. Crain's office on behalf of her Mom for results from petrosal sinus sampling procedure done 11/16.    Can be reached at [phone].    Will forward to Neurosurgery team.

## 2018-11-29 NOTE — TELEPHONE ENCOUNTER
LVMM informing daughter that we will contact her after Dr. Crain reviews results and speaks with endocrin. Contact information provided.

## 2019-01-08 ENCOUNTER — OFFICE VISIT (OUTPATIENT)
Dept: ENDOCRINOLOGY | Facility: CLINIC | Age: 54
End: 2019-01-08
Payer: COMMERCIAL

## 2019-01-08 VITALS
BODY MASS INDEX: 32.39 KG/M2 | HEART RATE: 66 BPM | HEIGHT: 63 IN | WEIGHT: 182.8 LBS | DIASTOLIC BLOOD PRESSURE: 84 MMHG | SYSTOLIC BLOOD PRESSURE: 125 MMHG

## 2019-01-08 DIAGNOSIS — E24.0 PITUITARY DEPENDENT CUSHING DISEASE (H): Primary | ICD-10-CM

## 2019-01-08 DIAGNOSIS — M81.8 OTHER OSTEOPOROSIS WITHOUT CURRENT PATHOLOGICAL FRACTURE: ICD-10-CM

## 2019-01-08 DIAGNOSIS — D35.2 PITUITARY MACROADENOMA (H): ICD-10-CM

## 2019-01-08 DIAGNOSIS — E55.9 VITAMIN D DEFICIENCY: ICD-10-CM

## 2019-01-08 ASSESSMENT — PAIN SCALES - GENERAL: PAINLEVEL: NO PAIN (0)

## 2019-01-08 ASSESSMENT — MIFFLIN-ST. JEOR: SCORE: 1398.31

## 2019-01-08 NOTE — PROGRESS NOTES
[facility]inology [facility]    CC: F/u visit for pituitary macroadenoma.     HPI: Mrs. Flores is a 54 year old [place] woman that is seen today for follow-up of a pituitary macroadenoma. The interview was facilitated by an . Dr. Crain was also involved withthis visit, and had an active role while we were discussing the diagnosis and therapeutic options with the patient.     Briefly, Mrs. Flores  had a pituitary macroadenoma diagnosed in 2008. At the time, Mrs. Flores was complaining of headaches. She had a MRI done which revealed a sellar mass, with no evidence of hormone excess or deficiency at that time. On 7/08, cortisol was 19.3, prolactin 8, TSH 1.3, FT4 0.76 (replaced with Levothyroxine for couple of months and then discontinued), LH 35, FSH 58, and normal alpha subunit. On 4/09, cortisol 14.6, TSH 2.84, FT4 1.06, prolactin 6, IGF-1 111. She was seen by Dr. Omer in our clinic initially in 2/2010, and repeated hormonal evaluation was essentially unchanged. She was seen by neurophthalmology in 2/2010, and was found to have no visual field defects.   There were some concern for Cushing's disease based on a positive dexamethasone suppression test on 12/14/13. Subsequent tests showed increased ACTH and increased free urinary cortisol.  A dexamethasone tests was ordered however it is unclear whether or not the patient did the test.  At some point she reported she had it done in [place], taking the dexamethasone at 11 PM on a Sunday, and having blood work done at 7:30 AM the next morning. I never received the results of these tests and was not able to locate them.  The patient did not have regular clinical follow-up, and she was see in the endocrine clinic again in 2015.  At that time, it was found that her tumor was stable in size.  She was again lost to follow-up until after she was seen at the [facility].   The patient was seen at [facility] in 2017, and had multiple tests  done there, and she reports she was told she was cured.  We contacted the [facility] and ask for these results to be faxed to us.    Results from [facility] dated November 14, 2017, collect at 9:15 AM, show:  CBC: normal; 25-hydroxy vitamin D: Total 20, D3 20, D2 <4  Sodium 141, potassium 4.5, calcium 9.7, glucose at 85, alkaline phosphatase 87, AST 16, creatinine 0.8, albumin 4.0.  TSH 1.7, free T4 0.9, prolactin 7.3, FSH 44.8, IGF-I 90; ACTH 160 (7.2-63); cortisol 11  24 hour urine collection: Urinary volume 1390 ml; Calcium 167 mg per 24 hours, creatinine 917 mg per 24 hours, cortisol 43  g per 24 hours    Brain MRI - November 7, 2017, interpretation reads as follows:   Large pituitary adenoma present in 2009 and 2010 and has sparsely regressed in the 2011 exam and almost completely resolved with in the 2015 and current exam.  Focal defect in the left side of the pituitary gland, where the prior mass was once noted. The normal plan this displaced to the right, with moderate rightward deviation of the pituitary stalk. There remains some hypoenhancing material in the medial aspect of the left cavernous sinus of uncertain significance; the initial studies showed a subtotal encasement of the left cavernous ICA by tumor and that this material has almost completely resolved except for the his small residual hypoenhancing region.  The component of the tumor extending posteriorly over the left Meckel's cave was present on the initial 3 studies, but that this component has also almost completely resolved.  No evidence of new tumor.  The suprasellar cistern is free of mass.  The mild to generalized cerebral and cerebral volume loss is stable since 2009.  Reminder normal.    DEXA scan was done on that same day and showed lowest T-score is -3.5    Based on the results above, we concluded the patient was not cured.  We had a long discussion with the patient and with her family and ordered additional tests.  MsPricila Mark  continues to complain to report headache.  Headache has been noted since 2008, but the frequency and intensity of the headache episodes have varied over the years. Tylenol offers good relief.  Headaches are pressure-like, specially in the temporal and occipital areas, some as prior. Mrs. Flores have a history of nausea and some dizziness that started at about the same time as the headaches, but this has now improved.  She continues to complain of her eyesight, that is getting worse, with no evident field defect. The patient denies galactorrhea or breast symptoms. Last menstrual period was about 17 years ago (at age 41?), after having her last baby, which she breast fed for 3 years. Menarche was at age of 13 with regular periods afterwards. She now reports she has been gaining some weight. She denies polyuria or polydipsia. She was diagnosed with hypertension and was on hydrochlorothiazide 12.5 mg/day for a while.     Mrs. Flores had a right leg (knee) fracture in 11/2008. She was found to have mild vitamin D deficiency, with 25-OH vitamin D levels of 24, and a PTH of 115. She was placed on vitamin D2 50,000 units per week for 12 weeks, followed by maintenance with vitamin D3 2,000 units daily.      The tumor size was reported as follows:   7/08 1.4 x 2.1 x 1.3 cm   5/09 1.5 x 2.3 x 1.3 cm   12/09 1.5 x 2.5 x 1.6 cm   6/10 1.3 x 2.3 x 1.3 cm   11/11 2.0 x 1.2 cm  1/14 1.8 x 0.9 cm  4/15 1.8 x 1.2 x 1.9 cm   11/17    ???    ROS:  11 point ROS as detailed in the HPI, below, or negative  Constitutional: no fevers, chills, fatigue,  or change in appetite. Weight gain   HEENT: Endorses headaches and no vision changes; no diplopia  CV: no chest pain or palpitations.   RESP: no dyspnea, cough, SOB.   GI: no vomiting.    Endo: no polyuria, polydipsia, temperature intolerance or sleep problems     Medications  Current Outpatient Medications   Medication     calcium carbonate (OS- MG [place]. [place]) 500 tablet     ibuprofen  "200 MG capsule     pantoprazole sodium 40 MG tablet     VITAMIN D, CHOLECALCIFEROL, PO     dexamethasone (DECADRON) 1 MG tablet     diphenhydrAMINE (BENADRYL) 25 MG tablet     ranitidine (ZANTAC) 150 MG tablet     No current facility-administered medications for this visit.      Family Hx   No Family Hx of pituitary disease, asthma or osteoporosis.   Has 4 healthy children, 25 yrs old girl, 20 yrs old twin boys and 17 yrs old boy.     Social Hx   Behavioral history: No tobacco use.   Home environment: No secondhand tobacco smoke in home.   Mrs. Flores does not work.   She lives with her  and their 4 children. Mrs. Flores lives in [place].  She does not smoke or consume alcohol.     PMH   Illnesses:   Non functioning (?) pituitary tumor as per HPI   Early ovarian failure/ incorrect age?  Osteoporosis/previous bone fracture     Surgeries:   Right knee (11/2008)     Menstrual History:   Menarche at age 13   Cycles were regular   Gestae 8 Para 4 (one gemellar pregnancy)   Menopause 14 yrs ago.     Physical Exam:  /84   Pulse 66   Ht 1.6 m (5' 3\")   Wt 82.9 kg (182 lb 12.8 oz)   BMI 32.38 kg/m    Body mass index is 32.38 kg/m .  General : Alert, oriented X4, no acute distress, no pallor, jaundice or cyanosis.   Psychological: appropriate mood and affect    Imaging Results   MRI BRAIN 16 Jun 2010 - MRI BRAIN W AND WO CONTRAST*   Findings: A sellar mass is noted measuring 1.3 x 2.3 x 1.3 cm (AP by transverse by craniocaudal, not significantly changed from remeasurement of comparison MRI 12/16/2009. The heterogeneously enhancing mass expands the sella with iso to hyperintensity on T1-weighted images and mild hyperintensity on T2-weighted images. The pituitary stalk is slightly deviated to the right, unchanged. There is no displacement of the optic chiasm. The carotid flow-voids are patent.   Trace periventricular T2 hyperintensity is again noted with a small focal area of increased T2 signal adjacent to the " lateral horn of the left lateral ventricles similar to outside MRI 5/12/2009.   Impression: No change in size of sellar mass lesion compatible with a pituitary macroadenoma.     DEXA HIP PELVIS SPINE 12 May 2010   Presuming that the patient is not postmenopausal, the lowest and valid Z-score of -4.8 at the level of the lumbar spine is BELOW the expected range for age. (see Ref. #3) According to the ISCD position statements at www.iscd.org, a Z-score more negative than -2.0 is below expected range for age. Note the wide range in BMD values and T- scores within the lumbar   spine (L2 at -3.5, L3 at -2.3). This pattern suggests degenerative joint disease or occult fractures at the lumbar level that would limit the detection of low bone density in the L1 - L4 spine region. The lumbar spine is therefore represented by L1-L2. Note the very negative Z- score within the lumbar spine.This suggests that secondary causes of low bone density might be present. Repeat DXA testing in 1-2 years could be considered. Clinical correlation recommended.     CHEST TWO VIEW* 04 Feb 2010 1  Findings: Haziness is seen within the lower lungs which likely represents summation shadow. No definite airspace opacities are seen. Cardiac silhouette and pulmonary vasculature are within normal limits. There is no pneumothorax.   Impression: No acute airspace opacity.     Brain/Pituitary MRI without and with contrast  Findings:  1.8 x 0.9 cm hypoenhancing left sellar mass (series 13, image 10), previously 2.2 x 1.2 cm on 11/30/2011 and 2.5 x 1.3 cm on 11/8/2010. On coronal images, the mass now demonstrates a concave superior margin, previously convex on 11/8/2010 and intermediate 11/30/2011. The mass continues to invade the left cavernous sinus with enhancing  soft tissue encasing the cavernous portion of the left carotid artery and displacing the right internal carotid artery. Carotid flow-voids are patent. There is erosion of the dorsum sella. The  pituitary sac is deviated to the right. There is preserved CSF space between the mass and the optic chiasm and optic nerves. The ventricles are not enlarged  out of proportion to the surgical sulci. Diffusion-weighted images demonstrate no evidence of acute infarct. No intracranial hemorrhage, mass effect, midline shift or extra-axial  fluid collection. Minimal nonspecific punctate periventricular white matter T2 FLAIR hyperintensities, unchanged since previous exam. Calvarium and orbits are unremarkable. The paranasal sinuses and mastoid air cells are clear.  IMPRESSION  1. Decrease in size of left sellar mass, which invades the left cavernous sinus and encases the cavernous carotid artery, compatible with known pituitary macroadenoma. The mass does not abut the optic chiasm or nerves. No hydrocephalus.  2. Stable minimal nonspecific periventricular white matter T2 FLAIR hyperintensity, which may represent early onset chronic small vessel ischemic disease or sequelae of prior vasculopathy, Infection/inflammation.    4/17/15  Brain/Pituitary MRI without and with contrast  History: Benign neoplasm of pituitary gland and craniopharyngeal duct (pouch).   Comparison: MRI dated 1/2/14   Findings: Again noted is a mass within the sella predominantly along the left lateral aspect with extension into the left cavernous sinus. The mass  appears isointense on the T1 and hypointense on the T2-weighted images and demonstrates enhancement. It encases the cavernous segment of the  left ICA. The mass measures 1.8 x 1.2 x 1.9 cm (TR, CC, AP), and is unchanged in size and appearance since prior study. There is no  extension into the suprasellar cistern. No mass effect on the optic chiasm. The pituitary stalk is displaced to the level right as before.  The major cavernous carotid vascular flow-voids appear patent.  No mass effect, midline shift, or significant enlargement of the  ventricles. Minimal T2 hyperintensity are seen in the  bilateral periventricular white matter are nonspecific and unchanged since prior  study.  Impression:   1. Stable appearance of mass within the sella predominantly along the left lateral aspect with extension into the left cavernous sinus,  consistent with known pituitary macroadenoma. No mass effect on the optic chiasm. No hydrocephalus.  2. Unchanged minimal nonspecific periventricular white matter T2 hyperintensities likely representing early onset of chronic small  vessel ischemic changes.      Brain MRI - November 7, 2017, done at [facility] - Interpretation reads as follows:   Large pituitary adenoma present in 2009 and 2010 and has sparsely regressed in the 2011 exam and almost completely resolved with in the 2015 and current exam.  Focal defect in the left side of the pituitary gland, where the prior mass was once noted. The normal plan this displaced to the right, with moderate rightward deviation of the pituitary stalk. There remains some hypoenhancing material in the medial aspect of the left cavernous sinus of uncertain significance; the initial studies showed a subtotal encasement of the left cavernous ICA by tumor and that this material has almost completely resolved except for the his small residual hypoenhancing region.  The component of the tumor extending posteriorly over the left Meckel's cave was present on the initial 3 studies, but that this component has also almost completely resolved.  No evidence of new tumor.  The suprasellar cistern is free of mass.  The mild to generalized cerebral and cerebral volume loss is stable since 2009.  Reminder normal. I did not have access to the images      DEXA scan - November 7, 2017, done at [facility] showed lowest T-score is -3.5    Lab results    [facility]. Results from November 14, 2017, collect at 9:15 AM, show:  CBC: normal; 25-hydroxy vitamin D: Total 20, D3 20, D2 <4  Sodium 141, potassium 4.5, calcium 9.7, glucose at 85, alkaline phosphatase  87, AST 16, creatinine 0.8, albumin 4.0.  TSH 1.7, free T4 0.9, prolactin 7.3, FSH 44.8, IGF-I 90; ACTH 160 (7.2-63); cortisol 11  24 hour urine collection: Urinary volume 1390 ml; Calcium 167 mg per 24 hours, creatinine 917 mg per 24 hours, cortisol 43  g per 24 hours    [facility] 9/4/2018   TSH 0.40 - 4.00 mU/L 1.38   PROLACTIN 3 - 27 ug/L 7   PROLACTIN 2.8 - 26.0 ng/mL 6.6   ADRENAL CORTICOTROPIN <47 pg/mL 242 (H)   FSH IU/L 42.8   LUTROPIN IU/L 21.2   HUMAN GROWTH HORMONE <7.0 ug/L 0.2   INSULIN GROWTH FACTOR 1 94 - 252 ng/mL    INSULIN GROWTH FACTOR 1 SD SCORE     INS GROWTH FACTOR 1 52 - 233 ng/ml 140   GLUCOSE 70 - 99 mg/dL 78     [facility] PITUITARY [facility] Latest Ref Rng & Units 5/3/2018   CORTISOL, SERUM 4 - 22 ug/dL 4.0     [facility] PITUITARY [facility] Latest Ref Rng & Units 5/1/2018   CORTISOL, SERUM 4 - 22 ug/dL 11.9   ADRENAL CORTICOTROPIN <47 pg/mL 182 (H)     [facility] PITUITARY [facility] Latest Ref Rng & Units 4/17/2015   CORTISOL FREE, URINE  34.2   CORTISOL FREE, URINE RANDOM  24.10   CORTISOL UG/G CRT  41.55     ENDO PITUITARY [facility] Latest Ref Rng & Units 4/8/2015   TSH 0.40 - 4.00 mU/L 1.56   PROLACTIN 3 - 27 ug/L 6   CORTISOL, SERUM 4 - 22 ug/dL 11.2   ADRENAL CORTICOTROPIN <47 pg/mL 134 (H)   FSH IU/L 45.6   LUTROPIN IU/L 19.7   HUMAN GROWTH HORMONE <7.0 ug/L 0.4   INSULIN GROWTH FACTOR 1 94 - 252 ng/mL    INSULIN GROWTH FACTOR 1 SD SCORE     INS GROWTH FACTOR 1 52 - 233 ng/ml 93   GLUCOSE 70 - 99 mg/dL        [facility] PITUITARY [facility] Latest Ref Rng 1/1/2014 12/3/2013   TSH 0.4 - 5.0 mU/L  1.54   PROLACTIN 3 - 27 ug/L  5   PROLACTIN   4.4   CORTISOL FREE, URINE  69.5 (H)    CORTISOL FREE, URINE RANDOM  45.70    CORTISOL UG/G CRT  47.60    CORTISOL, SERUM 4 - 22 ug/dL  11.9   ADRENAL CORTICOTROPIN 10 - 47 pg/mL  200 (H)   FSH   50.5   LUTROPIN   26.6   ESTRADIOL   30    - 144 mmol/L  143   POTASSIUM 3.4 - 5.3 mmol/L  3.9       [facility] Latest Ref Rng  4/12/2012   CORTISOL FREE, URINE  17.3   CORTISOL FREE, URINE RANDOM  10.90   CORTISOL UG/G CRT  21.80     ENDO PITUITARY LABS-[facility] Latest Ref Rng 2/2/2012   CORTISOL FREE, URINE  29.7   CORTISOL FREE, URINE RANDOM  53.00   CORTISOL UG/G CRT  44.17     [facility]O [facility]ARY LAB[facility] Latest Ref Rng 1/17/2012   CORTISOL, SERUM 4 - 22 ug/dL 14.9   ADRENAL CORTICOTROPIN 10 - 47 pg/mL 197 (H)    - 144 mmol/L 143   POTASSIUM 3.4 - 5.3 mmol/L 4.0     ENDO PITUITARY LABS-[facility] Latest Ref Rng 12/14/2011   CORTISOL, SERUM 4 - 22 ug/dL 3.4 (L)   ADRENAL CORTICOTROPIN 10 - 47 pg/mL 72 (H)     ENDO [facility]ARY [facility] Latest Ref Rng 12/9/2011   CORTISOL FREE, URINE  41.2   CORTISOL FREE, URINE RANDOM  24.70   CORTISOL UG/G CRT  34.31     [facility] Latest Ref Rng 12/7/2011   TSH 0.4 - 5.0 mU/L 1.10   PROLACTIN 3 - 27 ug/L 6   CORTISOL, SERUM 4 - 22 ug/dL 13.6   ADRENAL CORTICOTROPIN 10 - 47 pg/mL 139 (H)   FSH  43.0   LUTROPIN  22.6   INSULIN GROWTH FACTOR 1 94 - 252 ng/mL 78 (L)   INSULIN GROWTH FACTOR 1 SD SCORE  NEG 2.4   GLUCOSE 60 - 99 mg/dL 82     ENDO [facility]UITARY LABS[facility] Latest Ref Rng 11/8/2010   CORTISOL FREE, URINE  31.3   CORTISOL FREE, URINE RANDOM  32.20   CORTISOL UG/G CRT  33.89     ENDO [facility]ARY LAB[facility] Latest Ref Rng 11/2/2010   TSH 0.4 - 5.0 mU/L 1.44   PROLACTIN 3 - 27 ug/L 6   CORTISOL, SERUM 4 - 22 ug/dL 15.8   ADRENAL CORTICOTROPIN 10 - 47 pg/mL 253 (H)   FSH  45.7   LUTROPIN  28.1   ESTRADIOL  19   URINE OSMOLALITY 100 - 1200 mmol/kg 727   INSULIN GROWTH FACTOR 1 94 - 252 ng/mL 140   INSULIN GROWTH FACTOR 1 SD SCORE  NEG 1.1   GLUCOSE 60 - 99 mg/dL 83     ENDO [facility]ARY LABS[facility] Latest Ref Rng 5/6/2010 2/4/2010   TSH 0.4 - 5.0 mU/L 1.62 1.74   PROLACTIN 3 - 27 ug/L  7   CORTISOL, SERUM 4 - 22 ug/dL  14.0   FSH   49.8   LUTROPIN   33.7   ESTRADIOL   11   INSULIN GROWTH FACTOR 1 94 - 252 ng/mL  101   INSULIN GROWTH FACTOR 1 SD SCORE   NEG 2.0   GLUCOSE 60 - 99 mg/dL       ENDO PITUITARY LABS-[facility]  Latest Ref Rng 4/15/2009   TSH 0.4 - 5.0 mU/L 2.84   PROLACTIN 3 - 27 ug/L 6   CORTISOL, SERUM 4 - 22 ug/dL 14.6   TESTOSTERONE TOTAL 14 - 75 ng/dL 35   INSULIN GROWTH FACTOR 1 94 - 252 ng/mL 111   INSULIN GROWTH FACTOR 1 SD SCORE  NEG 1.8     ENDO CALCIUM LABS[facility] Latest Ref Rng & Units 9/4/2018   CALCIUM 8.5 - 10.1 mg/dL 9.2   PHOSPHOROUS 2.5 - 4.5 mg/dL 3.1   ALBUMIN 3.9 - 5.1 g/dL    CREATININE 0.52 - 1.04 mg/dL 0.67   PARATHYROID HORMONE INTACT 18 - 80 pg/mL 71   25 OH VIT D2 ug/L <5   25 OH VIT D3 ug/L 31   25 OH VIT D TOTAL 20 - 75 ug/L <36       Assessment and Plan:  Mrs. Flores is a 54 year old woman with a pituitary macroadenoma.   1. Pituitary macroadenoma/Cushing's disease: Ms. Flores has a diagnosis of Cushing's disease.  This has been confirmed multiple times, including via recent pituitary venous sampling.  We had had a long discussion with the patient regarding the diagnosis and therapeutic options.  As I mentioned earlier, Dr. Crain was very involved in today's discussion.  The patient has multiple questions regarding the procedure including its risks and the likelihood of cure.    2. Vitamin D deficiency: Mrs. Flores has a history of bone fracture and low vitamin D levels, along with increased PTH levels. Patient is on vitamin D replacement, and with improvement on vitamin D levels PTH levels have normalized.    3.  Osteoporosis: Bone densitometry done in November 2017 indicate osteoporosis (negative T score of 3.5).  The patient has vitamin D deficiency and Cushing's disease.  We will manage her Cushing's disease as above and we will discuss bisphosphonate therapy with the patient as indicated.  We will not start postpone it until after surgery, as to allow for some recovery of bone density.  Vitamin D deficiency, Cushing's disease and possible premature menopause are likely contributing to her osteoporosis.     Ms. Flores had multiple questions regarding the surgical procedure.  Many of her  questions were answered during today's visit, but unfortunately the  was late for another appointment and needed to leave.  We thus determined that was better for the patient to see Dr. Crain in clinic in the next few weeks to discuss the surgical procedure in further detail.  She was encouraged to discuss the plans for surgery with her family and bringing them in for his upcoming appointment.    The patient has previously provided me with her daughter's phone number [phone], [place], and she gave me authorization to discuss any aspect related to her health with her daughter.    Today's visit lasted over 50 minutes (face to face) and more than 50% of the time was spent discussing the diagnosis and the treatment plan.    No orders of the defined types were placed in this encounter.      Maine James MD PhD    Division of Endocrinology and Diabetes

## 2019-01-08 NOTE — LETTER
1/8/2019       RE: Francis Flores  [address]  [facility] [address]     Dear Colleague,    Thank you for referring your patient, Francis Flores, to the [facility] ENDOCRINOLOGY at [facility]. Please see a copy of my visit note below.        [facility]    CC: F/u visit for pituitary macroadenoma.     HPI: Mrs. Flores is a 54 year old [place] woman that is seen today for follow-up of a pituitary macroadenoma. The interview was facilitated by an . Dr. Crain was also involved withthis visit, and had an active role while we were discussing the diagnosis and therapeutic options with the patient.     Briefly, Mrs. Flores  had a pituitary macroadenoma diagnosed in 2008. At the time, Mrs. Flores was complaining of headaches. She had a MRI done which revealed a sellar mass, with no evidence of hormone excess or deficiency at that time. On 7/08, cortisol was 19.3, prolactin 8, TSH 1.3, FT4 0.76 (replaced with Levothyroxine for couple of months and then discontinued), LH 35, FSH 58, and normal alpha subunit. On 4/09, cortisol 14.6, TSH 2.84, FT4 1.06, prolactin 6, IGF-1 111. She was seen by Dr. Omer in our clinic initially in 2/2010, and repeated hormonal evaluation was essentially unchanged. She was seen by neurophthalmology in 2/2010, and was found to have no visual field defects.   There were some concern for Cushing's disease based on a positive dexamethasone suppression test on 12/14/13. Subsequent tests showed increased ACTH and increased free urinary cortisol.  A dexamethasone tests was ordered however it is unclear whether or not the patient did the test.  At some point she reported she had it done in [place], taking the dexamethasone at 11 PM on a Sunday, and having blood work done at 7:30 AM the next morning. I never received the results of these tests and was not able to locate them.  The patient did not have  regular clinical follow-up, and she was see in the endocrine clinic again in 2015.  At that time, it was found that her tumor was stable in size.  She was again lost to follow-up until after she was seen at the [facility].   The patient was seen at [facility] in 2017, and had multiple tests done there, and she reports she was told she was cured.  We contacted the [facility] and ask for these results to be faxed to us.    Results from [facility] dated November 14, 2017, collect at 9:15 AM, show:  CBC: normal; 25-hydroxy vitamin D: Total 20, D3 20, D2 <4  Sodium 141, potassium 4.5, calcium 9.7, glucose at 85, alkaline phosphatase 87, AST 16, creatinine 0.8, albumin 4.0.  TSH 1.7, free T4 0.9, prolactin 7.3, FSH 44.8, IGF-I 90; ACTH 160 (7.2-63); cortisol 11  24 hour urine collection: Urinary volume 1390 ml; Calcium 167 mg per 24 hours, creatinine 917 mg per 24 hours, cortisol 43  g per 24 hours    Brain MRI - November 7, 2017, interpretation reads as follows:   Large pituitary adenoma present in 2009 and 2010 and has sparsely regressed in the 2011 exam and almost completely resolved with in the 2015 and current exam.  Focal defect in the left side of the pituitary gland, where the prior mass was once noted. The normal plan this displaced to the right, with moderate rightward deviation of the pituitary stalk. There remains some hypoenhancing material in the medial aspect of the left cavernous sinus of uncertain significance; the initial studies showed a subtotal encasement of the left cavernous ICA by tumor and that this material has almost completely resolved except for the his small residual hypoenhancing region.  The component of the tumor extending posteriorly over the left Meckel's cave was present on the initial 3 studies, but that this component has also almost completely resolved.  No evidence of new tumor.  The suprasellar cistern is free of mass.  The mild to generalized cerebral and cerebral volume loss  is stable since 2009.  Reminder normal.    DEXA scan was done on that same day and showed lowest T-score is -3.5    Based on the results above, we concluded the patient was not cured.  We had a long discussion with the patient and with her family and ordered additional tests.  Ms. Flores continues to complain to report headache.  Headache has been noted since 2008, but the frequency and intensity of the headache episodes have varied over the years. Tylenol offers good relief.  Headaches are pressure-like, specially in the temporal and occipital areas, some as prior. Mrs. Flores have a history of nausea and some dizziness that started at about the same time as the headaches, but this has now improved.  She continues to complain of her eyesight, that is getting worse, with no evident field defect. The patient denies galactorrhea or breast symptoms. Last menstrual period was about 17 years ago (at age 41?), after having her last baby, which she breast fed for 3 years. Menarche was at age of 13 with regular periods afterwards. She now reports she has been gaining some weight. She denies polyuria or polydipsia. She was diagnosed with hypertension and was on hydrochlorothiazide 12.5 mg/day for a while.     Mrs. Flores had a right leg (knee) fracture in 11/2008. She was found to have mild vitamin D deficiency, with 25-OH vitamin D levels of 24, and a PTH of 115. She was placed on vitamin D2 50,000 units per week for 12 weeks, followed by maintenance with vitamin D3 2,000 units daily.      The tumor size was reported as follows:   7/08 1.4 x 2.1 x 1.3 cm   5/09 1.5 x 2.3 x 1.3 cm   12/09 1.5 x 2.5 x 1.6 cm   6/10 1.3 x 2.3 x 1.3 cm   11/11 2.0 x 1.2 cm  1/14 1.8 x 0.9 cm  4/15 1.8 x 1.2 x 1.9 cm   11/17    ???    ROS:  11 point ROS as detailed in the HPI, below, or negative  Constitutional: no fevers, chills, fatigue,  or change in appetite. Weight gain   HEENT: Endorses headaches and no vision changes; no diplopia  CV: no  "chest pain or palpitations.   RESP: no dyspnea, cough, SOB.   GI: no vomiting.    Endo: no polyuria, polydipsia, temperature intolerance or sleep problems     Medications  Current Outpatient Medications   Medication     calcium carbonate (OS- MG [place]. CA) 500 tablet     ibuprofen 200 MG capsule     pantoprazole sodium 40 MG tablet     VITAMIN D, CHOLECALCIFEROL, PO     dexamethasone (DECADRON) 1 MG tablet     diphenhydrAMINE (BENADRYL) 25 MG tablet     ranitidine (ZANTAC) 150 MG tablet     No current facility-administered medications for this visit.      Family Hx   No Family Hx of pituitary disease, asthma or osteoporosis.   Has 4 healthy children, 25 yrs old girl, 20 yrs old twin boys and 17 yrs old boy.     Social Hx   Behavioral history: No tobacco use.   Home environment: No secondhand tobacco smoke in home.   Mrs. Flores does not work.   She lives with her  and their 4 children. Mrs. Flores lives in [place].  She does not smoke or consume alcohol.     PMH   Illnesses:   Non functioning (?) pituitary tumor as per HPI   Early ovarian failure/ incorrect age?  Osteoporosis/previous bone fracture     Surgeries:   Right knee (11/2008)     Menstrual History:   Menarche at age 13   Cycles were regular   Gestae 8 Para 4 (one gemellar pregnancy)   Menopause 14 yrs ago.     Physical Exam:  /84   Pulse 66   Ht 1.6 m (5' 3\")   Wt 82.9 kg (182 lb 12.8 oz)   BMI 32.38 kg/m     Body mass index is 32.38 kg/m .  General : Alert, oriented X4, no acute distress, no pallor, jaundice or cyanosis.   Psychological: appropriate mood and affect    Imaging Results   MRI BRAIN 16 Jun 2010 - MRI BRAIN W AND WO CONTRAST*   Findings: A sellar mass is noted measuring 1.3 x 2.3 x 1.3 cm (AP by transverse by craniocaudal, not significantly changed from remeasurement of comparison MRI 12/16/2009. The heterogeneously enhancing mass expands the sella with iso to hyperintensity on T1-weighted images and mild hyperintensity " on T2-weighted images. The pituitary stalk is slightly deviated to the right, unchanged. There is no displacement of the optic chiasm. The carotid flow-voids are patent.   Trace periventricular T2 hyperintensity is again noted with a small focal area of increased T2 signal adjacent to the lateral horn of the left lateral ventricles similar to outside MRI 5/12/2009.   Impression: No change in size of sellar mass lesion compatible with a pituitary macroadenoma.     DEXA HIP PELVIS SPINE 12 May 2010   Presuming that the patient is not postmenopausal, the lowest and valid Z-score of -4.8 at the level of the lumbar spine is BELOW the expected range for age. (see Ref. #3) According to the ISCD position statements at www.iscd.org, a Z-score more negative than -2.0 is below expected range for age. Note the wide range in BMD values and T- scores within the lumbar   spine (L2 at -3.5, L3 at -2.3). This pattern suggests degenerative joint disease or occult fractures at the lumbar level that would limit the detection of low bone density in the L1 - L4 spine region. The lumbar spine is therefore represented by L1-L2. Note the very negative Z- score within the lumbar spine.This suggests that secondary causes of low bone density might be present. Repeat DXA testing in 1-2 years could be considered. Clinical correlation recommended.     CHEST TWO VIEW* 04 Feb 2010 1  Findings: Haziness is seen within the lower lungs which likely represents summation shadow. No definite airspace opacities are seen. Cardiac silhouette and pulmonary vasculature are within normal limits. There is no pneumothorax.   Impression: No acute airspace opacity.     Brain/Pituitary MRI without and with contrast  Findings:  1.8 x 0.9 cm hypoenhancing left sellar mass (series 13, image 10), previously 2.2 x 1.2 cm on 11/30/2011 and 2.5 x 1.3 cm on 11/8/2010. On coronal images, the mass now demonstrates a concave superior margin, previously convex on 11/8/2010 and  intermediate 11/30/2011. The mass continues to invade the left cavernous sinus with enhancing  soft tissue encasing the cavernous portion of the left carotid artery and displacing the right internal carotid artery. Carotid flow-voids are patent. There is erosion of the dorsum sella. The pituitary sac is deviated to the right. There is preserved CSF space between the mass and the optic chiasm and optic nerves. The ventricles are not enlarged  out of proportion to the surgical sulci. Diffusion-weighted images demonstrate no evidence of acute infarct. No intracranial hemorrhage, mass effect, midline shift or extra-axial  fluid collection. Minimal nonspecific punctate periventricular white matter T2 FLAIR hyperintensities, unchanged since previous exam. Calvarium and orbits are unremarkable. The paranasal sinuses and mastoid air cells are clear.  IMPRESSION  1. Decrease in size of left sellar mass, which invades the left cavernous sinus and encases the cavernous carotid artery, compatible with known pituitary macroadenoma. The mass does not abut the optic chiasm or nerves. No hydrocephalus.  2. Stable minimal nonspecific periventricular white matter T2 FLAIR hyperintensity, which may represent early onset chronic small vessel ischemic disease or sequelae of prior vasculopathy, Infection/inflammation.    4/17/15  Brain/Pituitary MRI without and with contrast  History: Benign neoplasm of pituitary gland and craniopharyngeal duct (pouch).   Comparison: MRI dated 1/2/14   Findings: Again noted is a mass within the sella predominantly along the left lateral aspect with extension into the left cavernous sinus. The mass  appears isointense on the T1 and hypointense on the T2-weighted images and demonstrates enhancement. It encases the cavernous segment of the  left ICA. The mass measures 1.8 x 1.2 x 1.9 cm (TR, CC, AP), and is unchanged in size and appearance since prior study. There is no  extension into the suprasellar  cistern. No mass effect on the optic chiasm. The pituitary stalk is displaced to the level right as before.  The major cavernous carotid vascular flow-voids appear patent.  No mass effect, midline shift, or significant enlargement of the  ventricles. Minimal T2 hyperintensity are seen in the bilateral periventricular white matter are nonspecific and unchanged since prior  study.  Impression:   1. Stable appearance of mass within the sella predominantly along the left lateral aspect with extension into the left cavernous sinus,  consistent with known pituitary macroadenoma. No mass effect on the optic chiasm. No hydrocephalus.  2. Unchanged minimal nonspecific periventricular white matter T2 hyperintensities likely representing early onset of chronic small  vessel ischemic changes.      Brain MRI - November 7, 2017, done at [facility] - Interpretation reads as follows:   Large pituitary adenoma present in 2009 and 2010 and has sparsely regressed in the 2011 exam and almost completely resolved with in the 2015 and current exam.  Focal defect in the left side of the pituitary gland, where the prior mass was once noted. The normal plan this displaced to the right, with moderate rightward deviation of the pituitary stalk. There remains some hypoenhancing material in the medial aspect of the left cavernous sinus of uncertain significance; the initial studies showed a subtotal encasement of the left cavernous ICA by tumor and that this material has almost completely resolved except for the his small residual hypoenhancing region.  The component of the tumor extending posteriorly over the left Meckel's cave was present on the initial 3 studies, but that this component has also almost completely resolved.  No evidence of new tumor.  The suprasellar cistern is free of mass.  The mild to generalized cerebral and cerebral volume loss is stable since 2009.  Reminder normal. I did not have access to the images      DEXA scan -  November 7, 2017, done at [facility] showed lowest T-score is -3.5    Lab results    [facility]. Results from November 14, 2017, collect at 9:15 AM, show:  CBC: normal; 25-hydroxy vitamin D: Total 20, D3 20, D2 <4  Sodium 141, potassium 4.5, calcium 9.7, glucose at 85, alkaline phosphatase 87, AST 16, creatinine 0.8, albumin 4.0.  TSH 1.7, free T4 0.9, prolactin 7.3, FSH 44.8, IGF-I 90; ACTH 160 (7.2-63); cortisol 11  24 hour urine collection: Urinary volume 1390 ml; Calcium 167 mg per 24 hours, creatinine 917 mg per 24 hours, cortisol 43  g per 24 hours    [facility] Units 9/4/2018   TSH 0.40 - 4.00 mU/L 1.38   PROLACTIN 3 - 27 ug/L 7   PROLACTIN 2.8 - 26.0 ng/mL 6.6   ADRENAL CORTICOTROPIN <47 pg/mL 242 (H)   FSH IU/L 42.8   LUTROPIN IU/L 21.2   HUMAN GROWTH HORMONE <7.0 ug/L 0.2   INSULIN GROWTH FACTOR 1 94 - 252 ng/mL    INSULIN GROWTH FACTOR 1 SD SCORE     INS GROWTH FACTOR 1 52 - 233 ng/ml 140   GLUCOSE 70 - 99 mg/dL 78     [facility] Latest Ref Rng & Units 5/3/2018   CORTISOL, SERUM 4 - 22 ug/dL 4.0     [facility] Latest Ref Rng & Units 5/1/2018   CORTISOL, SERUM 4 - 22 ug/dL 11.9   ADRENAL CORTICOTROPIN <47 pg/mL 182 (H)     [facility] Latest Ref Rng & Units 4/17/2015   CORTISOL FREE, URINE  34.2   CORTISOL FREE, URINE RANDOM  24.10   CORTISOL UG/G CRT  41.55     [facility] Latest Ref R[facility] & Units 4/8/2015   TSH 0.40 - 4.00 mU/L 1.56   PROLACTIN 3 - 27 ug/L 6   CORTISOL, SERUM 4 - 22 ug/dL 11.2   ADRENAL CORTICOTROPIN <47 pg/mL 134 (H)   FSH IU/L 45.6   LUTROPIN IU/L 19.7   HUMAN GROWTH HORMONE <7.0 ug/L 0.4   INSULIN GROWTH FACTOR 1 94 - 252 ng/mL    INSULIN GROWTH FACTOR 1 SD SCORE     INS GROWTH FACTOR 1 52 - 233 ng/ml 93   GLUCOSE 70 - 99 mg/dL        [facility] LAB[facility] Latest Ref Rng 1/1/2014 12/3/2013   TSH 0.4 - 5.0 mU/L  1.54   PROLACTIN 3 - 27 ug/L  5   PROLACTIN   4.4   CORTISOL FREE, URINE  69.5 (H)    CORTISOL  FREE, URINE RANDOM  45.70    CORTISOL UG/G CRT  47.60    CORTISOL, SERUM 4 - 22 ug/dL  11.9   ADRENAL CORTICOTROPIN 10 - 47 pg/mL  200 (H)   FSH   50.5   LUTROPIN   26.6   ESTRADIOL   30    - 144 mmol/L  143   POTASSIUM 3.4 - 5.3 mmol/L  3.9       ENDO PITUITARY LABS-[facility] Latest Ref Rng 4/12/2012   CORTISOL FREE, URINE  17.3   CORTISOL FREE, URINE RANDOM  10.90   CORTISOL UG/G CRT  21.80     ENDO PITUITARY LABS[facility] Latest Ref Rng 2/2/2012   CORTISOL FREE, URINE  29.7   CORTISOL FREE, URINE RANDOM  53.00   CORTISOL UG/G CRT  44.17     ENDO PITUITARY LAB[facility] Latest Ref Rng 1/17/2012   CORTISOL, SERUM 4 - 22 ug/dL 14.9   ADRENAL CORTICOTROPIN 10 - 47 pg/mL 197 (H)    - 144 mmol/L 143   POTASSIUM 3.4 - 5.3 mmol/L 4.0     ENDO PITUITARY LABS[facility] Latest Ref Rng 12/14/2011   CORTISOL, SERUM 4 - 22 ug/dL 3.4 (L)   ADRENAL CORTICOTROPIN 10 - 47 pg/mL 72 (H)     ENDO PITUITARY LABS[facility] Latest Ref Rng 12/9/2011   CORTISOL FREE, URINE  41.2   CORTISOL FREE, URINE RANDOM  24.70   CORTISOL UG/G CRT  34.31     ENDO PITUITARY LABS[facility] Latest Ref Rng 12/7/2011   TSH 0.4 - 5.0 mU/L 1.10   PROLACTIN 3 - 27 ug/L 6   CORTISOL, SERUM 4 - 22 ug/dL 13.6   ADRENAL CORTICOTROPIN 10 - 47 pg/mL 139 (H)   FSH  43.0   LUTROPIN  22.6   INSULIN GROWTH FACTOR 1 94 - 252 ng/mL 78 (L)   INSULIN GROWTH FACTOR 1 SD SCORE  NEG 2.4   GLUCOSE 60 - 99 mg/dL 82     ENDO PITUITARY LABS[facility] Latest Ref Rng 11/8/2010   CORTISOL FREE, URINE  31.3   CORTISOL FREE, URINE RANDOM  32.20   CORTISOL UG/G CRT  33.89     ENDO [facility]IT[facility]ARY LAB[facility] Latest Ref Rng 11/2/2010   TSH 0.4 - 5.0 mU/L 1.44   PROLACTIN 3 - 27 ug/L 6   CORTISOL, SERUM 4 - 22 ug/dL 15.8   ADRENAL CORTICOTROPIN 10 - 47 pg/mL 253 (H)   FSH  45.7   LUTROPIN  28.1   ESTRADIOL  19   URINE OSMOLALITY 100 - 1200 mmol/kg 727   INSULIN GROWTH FACTOR 1 94 - 252 ng/mL 140   INSULIN GROWTH FACTOR 1 SD SCORE  NEG 1.1   GLUCOSE 60 - 99 mg/dL 83     ENDO PITUITARY LABS-[facility] Latest Ref Rng 5/6/2010  2/4/2010   TSH 0.4 - 5.0 mU/L 1.62 1.74   PROLACTIN 3 - 27 ug/L  7   CORTISOL, SERUM 4 - 22 ug/dL  14.0   FSH   49.8   LUTROPIN   33.7   ESTRADIOL   11   INSULIN GROWTH FACTOR 1 94 - 252 ng/mL  101   INSULIN GROWTH FACTOR 1 SD SCORE   NEG 2.0   GLUCOSE 60 - 99 mg/dL       ENDO PITUITARY LABS-[facility] Latest Ref Rng 4/15/2009   TSH 0.4 - 5.0 mU/L 2.84   PROLACTIN 3 - 27 ug/L 6   CORTISOL, SERUM 4 - 22 ug/dL 14.6   TESTOSTERONE TOTAL 14 - 75 ng/dL 35   INSULIN GROWTH FACTOR 1 94 - 252 ng/mL 111   INSULIN GROWTH FACTOR 1 SD SCORE  NEG 1.8     ENDO CALCIUM LABS-[facility] Latest Ref Rng & Units 9/4/2018   CALCIUM 8.5 - 10.1 mg/dL 9.2   PHOSPHOROUS 2.5 - 4.5 mg/dL 3.1   ALBUMIN 3.9 - 5.1 g/dL    CREATININE 0.52 - 1.04 mg/dL 0.67   PARATHYROID HORMONE INTACT 18 - 80 pg/mL 71   25 OH VIT D2 ug/L <5   25 OH VIT D3 ug/L 31   25 OH VIT D TOTAL 20 - 75 ug/L <36       Assessment and Plan:  Mrs. Flores is a 54 year old woman with a pituitary macroadenoma.   1. Pituitary macroadenoma/Cushing's disease: Ms. Flores has a diagnosis of Cushing's disease.  This has been confirmed multiple times, including via recent pituitary venous sampling.  We had had a long discussion with the patient regarding the diagnosis and therapeutic options.  As I mentioned earlier, Dr. Crain was very involved in today's discussion.  The patient has multiple questions regarding the procedure including its risks and the likelihood of cure.    2. Vitamin D deficiency: Mrs. Flores has a history of bone fracture and low vitamin D levels, along with increased PTH levels. Patient is on vitamin D replacement, and with improvement on vitamin D levels PTH levels have normalized.    3.  Osteoporosis: Bone densitometry done in November 2017 indicate osteoporosis (negative T score of 3.5).  The patient has vitamin D deficiency and Cushing's disease.  We will manage her Cushing's disease as above and we will discuss bisphosphonate therapy with the patient as indicated.  We will  not start postpone it until after surgery, as to allow for some recovery of bone density.  Vitamin D deficiency, Cushing's disease and possible premature menopause are likely contributing to her osteoporosis.     Ms. Flores had multiple questions regarding the surgical procedure.  Many of her questions were answered during today's visit, but unfortunately the  was late for another appointment and needed to leave.  We thus determined that was better for the patient to see Dr. Crain in clinic in the next few weeks to discuss the surgical procedure in further detail.  She was encouraged to discuss the plans for surgery with her family and bringing them in for his upcoming appointment.    The patient has previously provided me with her daughter's phone number [phone], [place], and she gave me authorization to discuss any aspect related to her health with her daughter.    Today's visit lasted over 50 minutes (face to face) and more than 50% of the time was spent discussing the diagnosis and the treatment plan.    No orders of the defined types were placed in this encounter.    Maine James MD PhD    Division of Endocrinology and Diabetes

## 2019-01-24 ENCOUNTER — TELEPHONE (OUTPATIENT)
Dept: NEUROSURGERY | Facility: CLINIC | Age: 54
End: 2019-01-24

## 2019-02-06 ENCOUNTER — OFFICE VISIT (OUTPATIENT)
Dept: NEUROSURGERY | Facility: CLINIC | Age: 54
End: 2019-02-06
Payer: COMMERCIAL

## 2019-02-06 VITALS
DIASTOLIC BLOOD PRESSURE: 84 MMHG | BODY MASS INDEX: 33.52 KG/M2 | SYSTOLIC BLOOD PRESSURE: 128 MMHG | RESPIRATION RATE: 16 BRPM | TEMPERATURE: 98.1 F | HEART RATE: 67 BPM | OXYGEN SATURATION: 98 % | WEIGHT: 189.2 LBS

## 2019-02-06 DIAGNOSIS — E24.0 PITUITARY-DEPENDENT CUSHING'S DISEASE (H): Primary | ICD-10-CM

## 2019-02-06 ASSESSMENT — PAIN SCALES - GENERAL: PAINLEVEL: NO PAIN (0)

## 2019-02-06 NOTE — LETTER
2/6/2019       RE: Francis Flores  [address]  [facility] [address]     Dear Colleague,    Thank you for referring your patient, Francis Flores, to the [facility] NEUROSURGERY at [facility]. Please see a copy of my visit note below.    Dear Dr. Stack:    We saw Ms. Francis Flores today in Pituitary Clinic for follow-up of her pituitary adenoma diagnosed in 2008. Of note, this visit was performed with the assistance of an . Please see our previous note for a more detailed history. Most importantly, Dr. James's workup, including petrosal sinus sampling, strongly suggests that she has Cushing's disease. She is here today to discuss surgical resection further. Currently, she is doing well. Ms. Flores is on vitamin D replacement as she has a history of osteoporosis, which is likely a manifestation of Cushing's disease. She raised concern of facial swelling and itching that began after her petrosal sinus sampling procedure. She was accompanied by three daughters, a niece, and her neighbor along with a [facility] interpretor.    PHYSICAL EXAM: On exam, her general appearance is good. Extraocular movements intact. She moves all extremities equally and with good coordination. She does not have the stereotypical Cushingoid phenotype.    REVIEW OF STUDIES: We reviewed her MRI's dating back to 2008 and her most recent MRI from last month. There is a hypoenhancing tumor in the left side of the sella that extends into the left parasellar space, consistent with the pituitary adenoma, however, we do not see a clear demarcation between the tumor and the pituitary gland. It does appear that the tumor's current size is smaller than a few years ago.    IMPRESSION AND PLAN: We have confirmed a diagnosis of Cushing's disease. The majority of this 60 minute visit was spent discussing management and the prognosis of the condition with and without treatment. We discussed  the roles of surgery, radiation, and medical management. We emphasized the early morbidity and mortality associated with untreated Cushing's disease. She understands and agrees with surgical resection. We discussed the endoscopic, transnasal approach including the risks of death, stroke, carotid artery injury, CSF leak, panhypopituitarism, failure to achieve a cure, need for retreatment, infection, and she agrees to proceed. We also discussed the potentially turbulent postoperative course. We will arrange for her to meet our skull base surgery partner from ENT, Dr. Jeong, to discuss the sinonasal aspects of the operation.    Please do not hesitate to contact us with questions. We will keep you informed of her progress.     MD JUDY MELGAR Asef Chowdhury, am serving as a scribe to document services personally performed by Derrell Crain MD, based upon my observations and the provider's statements to me. All documentation has been reviewed by the aforementioned doctor prior to being entered into the official medical record.    I, Derrell Crain, attest that above named individual is acting in scribe capacity, has observed my performance of the services and has documented them in accordance with my direction. The documentation recorded by the scribe accurately reflects the service I personally performed and the decisions made by me.

## 2019-02-06 NOTE — PROGRESS NOTES
Dear Dr. Stack:    We saw Ms. Francis Flores today in Pituitary Clinic for follow-up of her pituitary adenoma diagnosed in 2008. Of note, this visit was performed with the assistance of an . Please see our previous note for a more detailed history. Most importantly, Dr. James's workup, including petrosal sinus sampling, strongly suggests that she has Cushing's disease. She is here today to discuss surgical resection further. Currently, she is doing well. Ms. Flores is on vitamin D replacement as she has a history of osteoporosis, which is likely a manifestation of Cushing's disease. She raised concern of facial swelling and itching that began after her petrosal sinus sampling procedure. She was accompanied by three daughters, a niece, and her neighbor along with a [facility] interpretor.    PHYSICAL EXAM: On exam, her general appearance is good. Extraocular movements intact. She moves all extremities equally and with good coordination. She does not have the stereotypical Cushingoid phenotype.    REVIEW OF STUDIES: We reviewed her MRI's dating back to 2008 and her most recent MRI from last month. There is a hypoenhancing tumor in the left side of the sella that extends into the left parasellar space, consistent with the pituitary adenoma, however, we do not see a clear demarcation between the tumor and the pituitary gland. It does appear that the tumor's current size is smaller than a few years ago.    IMPRESSION AND PLAN: We have confirmed a diagnosis of Cushing's disease. The majority of this 60 minute visit was spent discussing management and the prognosis of the condition with and without treatment. We discussed the roles of surgery, radiation, and medical management. We emphasized the early morbidity and mortality associated with untreated Cushing's disease. She understands and agrees with surgical resection. We discussed the endoscopic, transnasal approach including the risks of death, stroke,  carotid artery injury, CSF leak, panhypopituitarism, failure to achieve a cure, need for retreatment, infection, and she agrees to proceed. We also discussed the potentially turbulent postoperative course. We will arrange for her to meet our skull base surgery partner from ENT, Dr. Jeong, to discuss the sinonasal aspects of the operation.    Please do not hesitate to contact us with questions. We will keep you informed of her progress.     CANDIDA CRAIN MD    I, Linnea Hoffmann, am serving as a scribe to document services personally performed by Candida Crain MD, based upon my observations and the provider's statements to me. All documentation has been reviewed by the aforementioned doctor prior to being entered into the official medical record.    I, Candida Crain, attest that above named individual is acting in scribe capacity, has observed my performance of the services and has documented them in accordance with my direction. The documentation recorded by the scribe accurately reflects the service I personally performed and the decisions made by me.

## 2019-02-06 NOTE — LETTER
2/6/2019      RE: Francis Flores  [address]  [facility] [address]       Dear Dr. Stack:    We saw Ms. Francis Flores today in Pituitary Clinic for follow-up of her pituitary adenoma diagnosed in 2008. Of note, this visit was performed with the assistance of an . Please see our previous note for a more detailed history. Most importantly, Dr. James's workup, including petrosal sinus sampling, strongly suggests that she has Cushing's disease. She is here today to discuss surgical resection further. Currently, she is doing well. Ms. Flores is on vitamin D replacement as she has a history of osteoporosis, which is likely a manifestation of Cushing's disease. She raised concern of facial swelling and itching that began after her petrosal sinus sampling procedure. She was accompanied by three daughters, a niece, and her neighbor along with a [facility] interpretor.    PHYSICAL EXAM: On exam, her general appearance is good. Extraocular movements intact. She moves all extremities equally and with good coordination. She does not have the stereotypical Cushingoid phenotype.    REVIEW OF STUDIES: We reviewed her MRI's dating back to 2008 and her most recent MRI from last month. There is a hypoenhancing tumor in the left side of the sella that extends into the left parasellar space, consistent with the pituitary adenoma, however, we do not see a clear demarcation between the tumor and the pituitary gland. It does appear that the tumor's current size is smaller than a few years ago.    IMPRESSION AND PLAN: We have confirmed a diagnosis of Cushing's disease. The majority of this 60 minute visit was spent discussing management and the prognosis of the condition with and without treatment. We discussed the roles of surgery, radiation, and medical management. We emphasized the early morbidity and mortality associated with untreated Cushing's disease. She understands and agrees with surgical resection.  We discussed the endoscopic, transnasal approach including the risks of death, stroke, carotid artery injury, CSF leak, panhypopituitarism, failure to achieve a cure, need for retreatment, infection, and she agrees to proceed. We also discussed the potentially turbulent postoperative course. We will arrange for her to meet our skull base surgery partner from ENT, Dr. Jeong, to discuss the sinonasal aspects of the operation.    Please do not hesitate to contact us with questions. We will keep you informed of her progress.     MD JUDY MELGAR, Linnea Hoffmann, am serving as a scribe to document services personally performed by Derrell Crain MD, based upon my observations and the provider's statements to me. All documentation has been reviewed by the aforementioned doctor prior to being entered into the official medical record.    I, Derrell Crain, attest that above named individual is acting in scribe capacity, has observed my performance of the services and has documented them in accordance with my direction. The documentation recorded by the scribe accurately reflects the service I personally performed and the decisions made by me.

## 2019-02-11 DIAGNOSIS — D35.2 BENIGN NEOPLASM OF PITUITARY GLAND AND CRANIOPHARYNGEAL DUCT (POUCH) (H): Primary | ICD-10-CM

## 2019-02-11 DIAGNOSIS — D35.3 BENIGN NEOPLASM OF PITUITARY GLAND AND CRANIOPHARYNGEAL DUCT (POUCH) (H): Primary | ICD-10-CM

## 2019-02-12 ENCOUNTER — DOCUMENTATION ONLY (OUTPATIENT)
Dept: OTOLARYNGOLOGY | Facility: CLINIC | Age: 54
End: 2019-02-12

## 2019-02-12 NOTE — PROGRESS NOTES
[facility] / Neurosurgery scheduled with Dr Jeong / Dr Crain    Pre-surgery visit Dr Jeong  3/6/19  1:00 pm    PAC Consult and H&P  3/6/19  11:00 am        Procedure: Endoscopic transnasal resection pituitary tumor poss lumbar drain placement    Date:  3/25/19    Place:  [facility]  Admit      Post-OP Dr Jeong:  4/17/19  12:00 pm        Coordinated with Eva ([phone]) from Neuro department.  Eva will set up with scheduling at the hospital.    Shruti Perez   ENT [facility]-Op Coordinator  [phone]

## 2019-02-20 DIAGNOSIS — D35.2 BENIGN NEOPLASM OF PITUITARY GLAND AND CRANIOPHARYNGEAL DUCT (POUCH) (H): Primary | ICD-10-CM

## 2019-02-20 DIAGNOSIS — D35.3 BENIGN NEOPLASM OF PITUITARY GLAND AND CRANIOPHARYNGEAL DUCT (POUCH) (H): Primary | ICD-10-CM

## 2019-03-04 NOTE — TELEPHONE ENCOUNTER
FUTURE VISIT INFORMATION      FUTURE VISIT INFORMATION:    Date: 3/6/19    Time: 1PM    Location: [facility]  REFERRAL INFORMATION:    Referring provider:  Derrell Crain MD    Referring providers clinic:  [facility] Neuro[facility]    Reason for visit/diagnosis  discuss pituitary surgery     RECORDS REQUESTED FROM:       Clinic name Comments Records Status Imaging Status   [facility] 10/9/18, 2/6/19 notes with Dr Breann CUMMINS    [facility] 9/9/18, 4/17/15 MRI Brain Pituitary   11/30/11, 11/8/10 MRI Brain  [facility] PACS   [facility] Emergency   11/20/18 ED notes with Sri Bo NP   Care Everywhere    [facility] Endocrinology  9/4/18 notes with Dr Erika CUMMINS    [place] records 11/14/17 MRI Brain  2017 Endocrine notes  Report scanned in Epic PACS

## 2019-03-06 ENCOUNTER — PRE VISIT (OUTPATIENT)
Dept: OTOLARYNGOLOGY | Facility: CLINIC | Age: 54
End: 2019-03-06

## 2019-03-06 ENCOUNTER — OFFICE VISIT (OUTPATIENT)
Dept: OTOLARYNGOLOGY | Facility: CLINIC | Age: 54
End: 2019-03-06
Payer: COMMERCIAL

## 2019-03-06 ENCOUNTER — OFFICE VISIT (OUTPATIENT)
Dept: SURGERY | Facility: CLINIC | Age: 54
End: 2019-03-06
Payer: COMMERCIAL

## 2019-03-06 ENCOUNTER — ANESTHESIA EVENT (OUTPATIENT)
Dept: SURGERY | Facility: CLINIC | Age: 54
End: 2019-03-06

## 2019-03-06 VITALS
BODY MASS INDEX: 32.88 KG/M2 | HEART RATE: 63 BPM | WEIGHT: 185.6 LBS | DIASTOLIC BLOOD PRESSURE: 83 MMHG | SYSTOLIC BLOOD PRESSURE: 127 MMHG

## 2019-03-06 VITALS
WEIGHT: 185 LBS | BODY MASS INDEX: 32.78 KG/M2 | OXYGEN SATURATION: 99 % | HEART RATE: 73 BPM | DIASTOLIC BLOOD PRESSURE: 88 MMHG | SYSTOLIC BLOOD PRESSURE: 139 MMHG | HEIGHT: 63 IN

## 2019-03-06 DIAGNOSIS — Z01.818 PREOP EXAMINATION: ICD-10-CM

## 2019-03-06 DIAGNOSIS — D35.2 PITUITARY ADENOMA (H): Primary | ICD-10-CM

## 2019-03-06 DIAGNOSIS — Z01.818 PREOP EXAMINATION: Primary | ICD-10-CM

## 2019-03-06 LAB
ANION GAP SERPL CALCULATED.3IONS-SCNC: 5 MMOL/L (ref 3–14)
BUN SERPL-MCNC: 8 MG/DL (ref 7–30)
CALCIUM SERPL-MCNC: 9.1 MG/DL (ref 8.5–10.1)
CHLORIDE SERPL-SCNC: 107 MMOL/L (ref 94–109)
CO2 SERPL-SCNC: 26 MMOL/L (ref 20–32)
CREAT SERPL-MCNC: 0.64 MG/DL (ref 0.52–1.04)
ERYTHROCYTE [DISTWIDTH] IN BLOOD BY AUTOMATED COUNT: 14.7 % (ref 10–15)
GFR SERPL CREATININE-BSD FRML MDRD: >90 ML/MIN/{1.73_M2}
GLUCOSE SERPL-MCNC: 81 MG/DL (ref 70–99)
HCT VFR BLD AUTO: 48.3 % (ref 35–47)
HGB BLD-MCNC: 14.7 G/DL (ref 11.7–15.7)
MCH RBC QN AUTO: 27.4 PG (ref 26.5–33)
MCHC RBC AUTO-ENTMCNC: 30.4 G/DL (ref 31.5–36.5)
MCV RBC AUTO: 90 FL (ref 78–100)
PLATELET # BLD AUTO: 289 10E9/L (ref 150–450)
POTASSIUM SERPL-SCNC: 3.8 MMOL/L (ref 3.4–5.3)
RBC # BLD AUTO: 5.36 10E12/L (ref 3.8–5.2)
SODIUM SERPL-SCNC: 138 MMOL/L (ref 133–144)
WBC # BLD AUTO: 5.9 10E9/L (ref 4–11)

## 2019-03-06 SDOH — HEALTH STABILITY: MENTAL HEALTH: HOW OFTEN DO YOU HAVE A DRINK CONTAINING ALCOHOL?: NEVER

## 2019-03-06 ASSESSMENT — PAIN SCALES - GENERAL: PAINLEVEL: NO PAIN (0)

## 2019-03-06 ASSESSMENT — LIFESTYLE VARIABLES: TOBACCO_USE: 0

## 2019-03-06 ASSESSMENT — MIFFLIN-ST. JEOR: SCORE: 1408.28

## 2019-03-06 NOTE — PATIENT INSTRUCTIONS
Saline Irrigations:    1) Wash hands. Fill the clean bottle with distilled, micro-filtered (through 0.2 micron filter), or previously boiled and cooled down water.     -Do not use tap or faucet water for dissolving the mixture unless it has been previously boiled for five minutes or more as boiling sterilizes the water.     -You can store boiled water in a clean container for seven days or more if refrigerated.     2) Cut the Sinus Rinse mixture packet at the corner and pour its contents into the bottle. Tighten the cap and tube on the bottle securely. Place one finger over the tip of the cap and shake the bottle gently to dissolve the mixture.    3) Standing in front of a sink, bend forward to your comfort level and tilt your head down.  Keeping your mouth open, without holding your breath, place the cap snugly against your nasal passage.   SQUEEZE BOTTLE GENTLY until the solution starts draining from the OPPOSITE nasal passage.   Some may drain from your mouth. For a proper rinse, keep squeezing the bottle GENTLY until at least 1/4 to 1/2 (60 mL to 120 mL or 2 to 4 fl oz) of the bottle is used.  Do not swallow the solution.    4) Now repeat steps 2 and 3 for your other nasal passage.    5) Clean the bottle and cap. Air dry the Sinus Rinse bottle, cap, and tube on a clean paper towel, a lint free towel.    -It is very important to keep these devices clean and free from any contamination.    -Recommended to replace the bottle every 3 months.    -Please perform routine inspections of the bottle and tube for any discolorations and cracks. If there are any visual signs of deterioration or permanent color changes, please clean thoroughly. If the discolorations remain after cleansing, discard the items and purchase new ones.     Step 1: Rinse the cap, tube and bottle using running water.  Step 2: Add a few drops of dish washing liquid or baby shampoo.  Step 3: Attach the cap and tube to the bottle; hold your finger over  the opening in the cap and shake the bottle vigorously.  Step 4: Squeeze the bottle hard to allow the soapy solution to clean the interior of the tube and the cap. Empty out the bottle completely.  Step 5: Rinse the soap from the bottle, cap and tube thoroughly and place the items on a clean paper towel to dry.

## 2019-03-06 NOTE — LETTER
3/6/2019       RE: Francis Flores  [address]  [facility] [address]     Dear Colleague,    Thank you for referring your patient, Francis Flores, to the [facility] EAR NOSE AND THROAT [facility]. Please see a copy of my visit note below.    Otolaryngology Adult Consultation    Patient: Francis Flores   : 1965     Patient presents with:  Consult:   Chief Complaint   Patient presents with     Consult     discuss pituitary surgery         Referring Provider: Derrell Reynolds*   Consulting Physician:  Leidy Jeong MD       Assessment/Plan: Patient with known pituitary macroadenoma, and evidence of Cushing's disease.  We discussed the surgical approach.  I answered her questions to the best of my ability.      HPI: Francis Flores is a 54 year old female seen today in the Otolaryngology Clinic for a history of known pituitary adenoma diagnosed in . She underwent petrosal sampling showing Cushing's disease.  She has osteoporosis, which is likely related. She does not complain of sinus or nasal issues.     Current Outpatient Medications on File Prior to Visit:  calcium carbonate (OS- MG [place]. [place]) 500 tablet Take 1 tablet by mouth 2 times daily   ibuprofen 200 MG capsule Take 400 mg by mouth every 4 hours as needed for fever   pantoprazole sodium 40 MG tablet Take 1 packet by mouth daily.   ranitidine (ZANTAC) 150 MG tablet Take 1 tablet by mouth 2 times daily.   VITAMIN D, CHOLECALCIFEROL, PO Take 2,000 Units by mouth daily     No current facility-administered medications on file prior to visit.      No Known Allergies    Past Medical History:   Diagnosis Date     GERD (gastroesophageal reflux disease)      Osteoporosis      Pituitary adenoma (H) 2012         Review of Systems   ENT ROS 3/6/2019   Eyes Visual loss   Ears, Nose, Throat Hearing loss, Ringing/noise in ears   Gastrointestinal/Genitourinary Heartburn/indigestion         14 point ROS neg other than the symptoms noted above.    Physical Exam:    General Assessment   The patient is alert, oriented and in no acute distress.   Head/Face  Grossly normal.   Nose  External nose is straight, skin is normal. Intranasal exam (anterior rhinoscopy) reveals normal moist mucosa, turbinate tissue without edema, erythema or crusting.  Septum mainly in the midline.      MRI  Impression: No change in pituitary mass extending into the left  cavernous sinus compatible with known pituitary macroadenoma.             Again, thank you for allowing me to participate in the care of your patient.      Sincerely,    Leidy Jeong MD

## 2019-03-06 NOTE — PATIENT INSTRUCTIONS
Preparing for Your Surgery      Name:  Francis Flores   MRN:  [id number]   :  1965   Today's Date:  3/6/2019     Arriving for surgery:  Surgery date:  3/25/19  Arrival time:  5AM  Please come to:       [facility] [address]  [address] [place]  [address]    -   parking is available in front of the hospital     -  Stop at the Information Desk in the lobby    -   Inform the information person that you are here for surgery. An escort to 3c will be provided. If you would not like an escort, please proceed to 3C on the 3rd floor. [phone]     Influenza visitor restrictions are in force at this time.  The [facility] is prohibiting the following visitors:  -Any sick persons regardless of age  -Anyone with known exposure to a communicable illness  -Anyone under the age of 5    What can I eat or drink?  -  You may have solid food or milk products until 8 hours prior to your surgery. 3/24/19, 11:30PM  -  You may have water, apple juice or 7up/Sprite until 2 hours prior to your surgery. 3/25/19, 5AM    Which medicines can I take?  Stop Aspirin one week prior to surgery.  Hold Ibuprofen for 24 hours and/or Naproxen for 48 hours prior to surgery.   -  Do NOT take these medications in the morning, the day of surgery:    Calcium  Vitamin D    -  Please take these medications the day of surgery:    Pantoprazole  Ranitidine(Zantac)    How do I prepare myself?  -  Take two showers: one the night before surgery; and one the morning of surgery.         Use Scrubcare or Hibiclens to wash from neck down.  You may use your own shampoo and conditioner. No other hair products.   -  Do NOT use lotion, powder, deodorant, or antiperspirant the day of your surgery.  -  Do NOT wear any makeup, fingernail polish or jewelry.  - Do not bring your own medications to the hospital, except for inhalers and eye drops.  -  Bring your ID and insurance card.    Questions or Concerns:  -If you have  questions or concerns regarding the day of surgery, please call [phone].     -For questions after surgery please call your surgeons office.           AFTER YOUR SURGERY  Breathing exercises   Breathing exercises help you recover faster. Take deep breaths and let the air out slowly. This will:     Help you wake up after surgery.    Help prevent complications like pneumonia.  Preventing complications will help you go home sooner.  Nausea and vomiting   You may feel sick to your stomach after surgery; if so, let your nurse know.    Pain control:  After surgery, you may have pain. Our goal is to help you manage your pain. Pain medicine will help you feel comfortable enough to do activities that will help you heal.  These activities may include breathing exercises, walking and physical therapy.   To help your health care team treat your pain we will ask: 1) If you have pain  2) where it is located 3) describe your pain in your words  Methods of pain control include medications given by mouth, vein or by nerve block for some surgeries.  Sequential Compression Device (SCD) or Pneumo Boots:  You may need to wear SCD S on your legs or feet. These are wraps connected to a machine that pumps in air and releases it. The repeated pumping helps prevent blood clots from forming.

## 2019-03-06 NOTE — NURSING NOTE
I provided patient education on saline irrigations.  She was able to demonstrate irrigations with positive teach back.    No questions or concerns noted at this time.  I provided written teaching education as patient states her son will assist with reading if she was to forget.    Saline Irrigations:    1) Wash hands. Fill the clean bottle with distilled, micro-filtered (through 0.2 micron filter), or previously boiled and cooled down water.     -Do not use tap or faucet water for dissolving the mixture unless it has been previously boiled for five minutes or more as boiling sterilizes the water.     -You can store boiled water in a clean container for seven days or more if refrigerated.     2) Cut the Sinus Rinse mixture packet at the corner and pour its contents into the bottle. Tighten the cap and tube on the bottle securely. Place one finger over the tip of the cap and shake the bottle gently to dissolve the mixture.    3) Standing in front of a sink, bend forward to your comfort level and tilt your head down.  Keeping your mouth open, without holding your breath, place the cap snugly against your nasal passage.   SQUEEZE BOTTLE GENTLY until the solution starts draining from the OPPOSITE nasal passage.   Some may drain from your mouth. For a proper rinse, keep squeezing the bottle GENTLY until at least 1/4 to 1/2 (60 mL to 120 mL or 2 to 4 fl oz) of the bottle is used.  Do not swallow the solution.    4) Now repeat steps 2 and 3 for your other nasal passage.    5) Clean the bottle and cap. Air dry the Sinus Rinse bottle, cap, and tube on a clean paper towel, a lint free towel.    -It is very important to keep these devices clean and free from any contamination.    -Recommended to replace the bottle every 3 months.    -Please perform routine inspections of the bottle and tube for any discolorations and cracks. If there are any visual signs of deterioration or permanent color changes, please clean thoroughly. If  the discolorations remain after cleansing, discard the items and purchase new ones.     Step 1: Rinse the cap, tube and bottle using running water.  Step 2: Add a few drops of dish washing liquid or baby shampoo.  Step 3: Attach the cap and tube to the bottle; hold your finger over the opening in the cap and shake the bottle vigorously.  Step 4: Squeeze the bottle hard to allow the soapy solution to clean the interior of the tube and the cap. Empty out the bottle completely.  Step 5: Rinse the soap from the bottle, cap and tube thoroughly and place the items on a clean paper towel to dry.

## 2019-03-06 NOTE — H&P
Pre-Operative H & P     CC:  Preoperative exam to assess for increased cardiopulmonary risk while undergoing surgery and anesthesia.    Date of Encounter: 3/6/2019  Primary Care Physician:  Eliu Dewey  Associated Diagnosis:  Pituitary Tumor    HPI  Francis Flores is a 54 year old female who presents for pre-operative H & P in preparation for Stealth Assisted Endoscopic Transnasal Resection of Pituitary Tumor with Ian Crain and Jean-Paul on 3/25/2019 at [facility].     Ms. Flores is a 54 year old female with GERD, osteoporosis, and Cushing's Disease.   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  At the time, Mrs. Flores was complaining of headaches. MRI revealed a sellar mass, labs did not show evidence of hormone excess or deficiency. She was seen by neurophthalmology in 2/2010, and was found to have no visual field defects.  Ms. Flores continues to complain of headache.  Headache has been noted since 2008, but the frequency and intensity of the headache episodes have varied over the years. Tylenol offers good relief.  Headaches are pressure-like, specially in the temporal and occipital areas, some as prior. Mrs. Flores has a history of nausea and some dizziness that started at about the same time as the headaches, but this has now improved.  On 11/16/2018 the patient underwent IR carotid cerebral angiogram bilateral with venous ACTH sampling of petrosal sinuses and the results suggested a pituitary source of the Cushing's syndrome (Cushing's disease).  The above procedure is planned.      History is obtained from the patient via .    Past Medical History  Past Medical History:   Diagnosis Date     GERD (gastroesophageal reflux disease)      Osteoporosis      Pituitary adenoma (H) 4/13/2012       Past Surgical History  Past Surgical History:   Procedure Laterality Date     CYSTOSCOPY BLADDER WITH STONE EXTRACTION Right      IR  CAROTID CEREBRAL ANGIOGRAM BILATERAL  11/16/2018     ORTHOPEDIC SURGERY      knee       Hx of Blood transfusions/reactions: h/o blood transfusion during birth of twins, no reaction     Hx of abnormal bleeding or anti-platelet use: none    Steroid use in the last year: fairly recent decadron use but dates unknown    Personal or FH with difficulty with Anesthesia:  none    Prior to Admission Medications  Current Outpatient Medications   Medication Sig Dispense Refill     calcium carbonate (OS- MG [place]. [place]) 500 tablet Take 1 tablet by mouth 2 times daily       ibuprofen 200 MG capsule Take 400 mg by mouth every 4 hours as needed for fever 60 capsule 3     pantoprazole sodium 40 MG tablet Take 1 packet by mouth daily.       ranitidine (ZANTAC) 150 MG tablet Take 1 tablet by mouth 2 times daily.       VITAMIN D, CHOLECALCIFEROL, PO Take 2,000 Units by mouth daily         Allergies  No Known Allergies    Social History  Social History     Socioeconomic History     Marital status: Unknown     Spouse name: Not on file     Number of children: Not on file     Years of education: Not on file     Highest education level: Not on file   Occupational History     Not on file   Social Needs     Financial resource strain: Not on file     Food insecurity:     Worry: Not on file     Inability: Not on file     Transportation needs:     Medical: Not on file     Non-medical: Not on file   Tobacco Use     Smoking status: Never Smoker     Smokeless tobacco: Never Used   Substance and Sexual Activity     Alcohol use: Not on file     Drug use: Not on file     Sexual activity: Not on file   Lifestyle     Physical activity:     Days per week: Not on file     Minutes per session: Not on file     Stress: Not on file   Relationships     Social connections:     Talks on phone: Not on file     Gets together: Not on file     Attends Yazidi service: Not on file     Active member of club or organization: Not on file     Attends meetings of  "clubs or organizations: Not on file     Relationship status: Not on file     Intimate partner violence:     Fear of current or ex partner: Not on file     Emotionally abused: Not on file     Physically abused: Not on file     Forced sexual activity: Not on file   Other Topics Concern     Not on file   Social History Narrative     Not on file       Family History  Family History   Problem Relation Age of Onset     No Known Problems Mother      No Known Problems Father          of old age     Glaucoma No family hx of      Macular Degeneration No family hx of        ROS/MED HX  The complete review of systems is negative other than noted in the HPI or here.   ENT/Pulmonary:      (-) tobacco use, asthma and NOEMI risk factors   Neurologic:  - neg neurologic ROS     Cardiovascular:  - neg cardiovascular ROS   (+) ----. : . . . :. . Previous cardiac testing date:results:Stress Testdate:3/14/18           METS/Exercise Tolerance:  3 - Able to walk 1-2 blocks without stopping   Hematologic:     (+) History of Transfusion no previous transfusion reaction -      Musculoskeletal:   (+) , , other musculoskeletal- torn meniscus of knee      GI/Hepatic:     (+) GERD Asymptomatic on medication,       Renal/Genitourinary:     (+) Other Renal/ Genitourinary, h/o ureterolithiasis      Endo:     (+) Obesity, Other Endocrine Disorder pituitary adenoma, Cushing's Disease, osteoporosis.      Psychiatric:  - neg psychiatric ROS       Infectious Disease:  - neg infectious disease ROS       Malignancy:         Other:    (+) No chance of pregnancy C-spine cleared: N/A, no H/O Chronic Pain,             BP: 139/88 Pulse: 73     SpO2: 99 %         185 lbs 0 oz  5' 3\"   Body mass index is 32.77 kg/m .       Physical Exam  Constitutional: Awake, alert, cooperative, no apparent distress, and appears stated age. Accompanied by sons and .  Eyes: Pupils equal, round and reactive to light, extra ocular muscles intact, sclera clear, " conjunctiva normal.  HENT: Normocephalic, oral pharynx with moist mucus membranes, good dentition. No goiter appreciated.   Respiratory: Clear to auscultation bilaterally, no crackles or wheezing.  Cardiovascular: Regular rate and rhythm, normal S1 and S2, and no murmur noted.  Carotids +2, no bruits. No edema. Palpable pulses to radial  DP and PT arteries.   GI: Normal bowel sounds, soft, non-distended, non-tender, no masses palpated, no hepatosplenomegaly.    Lymph/Hematologic: No cervical lymphadenopathy and no supraclavicular lymphadenopathy.  Genitourinary:  defered  Skin: Warm and dry.  No rashes at anticipated surgical site.   Musculoskeletal: Full ROM of neck. There is no redness, warmth, or swelling of the joints. Gross motor strength is normal.    Neurologic: Awake, alert, oriented to name, place and time. Cranial nerves II-XII are grossly intact. Gait is normal.   Neuropsychiatric: Calm, cooperative. Normal affect.     Labs: (personally reviewed)  Lab Results   Component Value Date    WBC 5.9 03/06/2019     Lab Results   Component Value Date    RBC 5.36 03/06/2019     Lab Results   Component Value Date    HGB 14.7 03/06/2019     Lab Results   Component Value Date    HCT 48.3 03/06/2019     No components found for: MCT  Lab Results   Component Value Date    MCV 90 03/06/2019     Lab Results   Component Value Date    MCH 27.4 03/06/2019     Lab Results   Component Value Date    MCHC 30.4 03/06/2019     Lab Results   Component Value Date    RDW 14.7 03/06/2019     Lab Results   Component Value Date     03/06/2019     Last Basic Metabolic Panel:  Sodium   Date Value Ref Range Status   03/06/2019 138 133 - 144 mmol/L Final     Potassium   Date Value Ref Range Status   03/06/2019 3.8 3.4 - 5.3 mmol/L Final     Chloride   Date Value Ref Range Status   03/06/2019 107 94 - 109 mmol/L Final     Carbon Dioxide   Date Value Ref Range Status   03/06/2019 26 20 - 32 mmol/L Final     Anion Gap   Date Value Ref  Range Status   03/06/2019 5 3 - 14 mmol/L Final     Glucose   Date Value Ref Range Status   03/06/2019 81 70 - 99 mg/dL Final     Urea Nitrogen   Date Value Ref Range Status   03/06/2019 8 7 - 30 mg/dL Final     Creatinine   Date Value Ref Range Status   03/06/2019 0.64 0.52 - 1.04 mg/dL Final     GFR Estimate   Date Value Ref Range Status   03/06/2019 >90 >60 mL/min/[1.73_m2] Final     Comment:     Non  GFR Calc  Starting 12/18/2018, serum creatinine based estimated GFR (eGFR) will be   calculated using the Chronic Kidney Disease Epidemiology Collaboration   (CKD-EPI) equation.       Calcium   Date Value Ref Range Status   03/06/2019 9.1 8.5 - 10.1 mg/dL Final     Stress test: 3/14/2018  results:Pre-exercise: Baseline LV size and systolic function appear normal. Ejection fraction estimated at 55%. Wall thickness appears normal. Left atrial size appears normal. With prescreening color flow Doppler exam there is no clinically significant valve disease.      Post-exercise:  Immediate post-exercise images obtained within 90 seconds demonstrate some increase in contractility in all visualized segments.  The ejection fraction improves to 65%.  There are no stress-induced regional wall motion abnormalities seen.      CONCLUSION:  1. Normal stress echocardiogram with no evidence of myocardial ischemia.    2. Normal baseline LV size and systolic function.    3. No significant valve disease.    IR carotid cerebral angiogram bilateral with venous ACTH sampling of petrosal sinuses  11/16/2018:  Findings:     Bilateral inferior petrosal venous sinus injections: Cranial view  Biplane angiography was performed over the cranium. Cranial view of  the bilateral inferior petrosal sinus injection in AP and lateral  projections reveal single venous channel of the right and the left  inferior petrosal sinuses that drain into their respective internal  jugular veins.     Skull single fluoroscopic images: Cranial  view  Single fluoroscopic images were obtained in AP and lateral projections  that confirm microcatheter positioning in right and left inferior  petrosal sinuses during the procedure.     Results table: (ACTH levels)     Time-frame  RIPSS  LIPSS  Peripheral  1520  (-10)    243      808  112  1525    (-5)    166      380  129  1530     (0)    129      647  121  1535   (+5)    258  >1250  232  1540 (+10)    407  >1250  322  1545 (+15)    402  >1250  384  1550 (+20)    465  >1250  395     Derrell Crain MD was present for the key portions and  immediately available for the entire procedure.     Impression:  Successful completion of bilateral inferior petrosal venous sinus  sampling without any immediate complications. The results suggest a  pituitary source of the Cushing's syndrome (Cushing's disease) with  left sided lateralization.     MRI brain 9/9/2018  Findings:    No change in the hypoenhancing pituitary mass measuring approximately  17 x 7 mm in the coronal plane (image 8, series 19) with persistent  extension into the left cavernous sinus surrounding portions of the  left internal carotid artery. Slight rightward deviation of the  pituitary stalk, unchanged. Intact nondisplaced optic chiasm.     Normal cavernous carotid vascular flow-voids.       Mild leukoaraiosis, normal for age. No midline shift. Ventricles are  proportionate to the cerebral sulci..     Impression: No change in pituitary mass extending into the left  cavernous sinus compatible with known pituitary macroadenoma.   Outside records reviewed from: care everywhere    Imaging and cardiac testing reviewed by this provider    Reviewed Care Everywhere      ASSESSMENT and PLAN  Francis Flores is a 54 year old female scheduled to undergo Stealth Assisted Endoscopic Transnasal Resection of Pituitary Tumor. She has osteoporosis, Cushing's disease and GERD.  She has the following specific operative considerations:     - RCRI : No serious cardiac  risks.  0.4% risk of major adverse cardiac event.   - Anesthesia considerations:  Refer to PAC assessment in anesthesia records  - VTE risk: 0.26%  - NOEMI # of risks 1/8 = low risk  - Risk of PONV score = 3.  If > 2, anti-emetic intervention recommended.    #GI - GERD, controlled with pantoprazole and ranitidine.    Type and screen drawn today.   Will require .    Patient was discussed with Dr Salas.    MIREYA Mcfarlane CNS  Preoperative Assessment Center  [facility]  Phone: [phone]  Fax: [phone]

## 2019-03-06 NOTE — NURSING NOTE
Chief Complaint   Patient presents with     Consult     discuss pituitary surgery      Jelly Lin LPN

## 2019-03-06 NOTE — ANESTHESIA PREPROCEDURE EVALUATION
Anesthesia Pre-Procedure Evaluation    Patient: Francis Flores   MRN:     [id number] Gender:   female   Age:    54 year old :      1965        Preoperative Diagnosis: * No surgery found *        Past Medical History:   Diagnosis Date     Pituitary adenoma (H) 2012      Past Surgical History:   Procedure Laterality Date     IR CAROTID CEREBRAL ANGIOGRAM BILATERAL  2018     ORTHOPEDIC SURGERY      knee          Anesthesia Evaluation     . Pt has had prior anesthetic. Type: MAC and General    No history of anesthetic complications          ROS/MED HX    ENT/Pulmonary:      (-) tobacco use, asthma and NOEMI risk factors   Neurologic:  - neg neurologic ROS     Cardiovascular:  - neg cardiovascular ROS   (+) ----. : . . . :. . Previous cardiac testing date:results:Stress Testdate:3/14/18 results:Pre-exercise: Baseline LV size and systolic function appear normal. Ejection fraction estimated at 55%. Wall thickness appears normal. Left atrial size appears normal. With prescreening color flow Doppler exam there is no clinically significant valve disease.      Post-exercise:  Immediate post-exercise images obtained within 90 seconds demonstrate some increase in contractility in all visualized segments.  The ejection fraction improves to 65%.  There are no stress-induced regional wall motion abnormalities seen.      CONCLUSION:  1. Normal stress echocardiogram with no evidence of myocardial ischemia.    2. Normal baseline LV size and systolic function.    3. No significant valve disease.     date: results: date: results:          METS/Exercise Tolerance:  3 - Able to walk 1-2 blocks without stopping   Hematologic:     (+) History of Transfusion no previous transfusion reaction -      Musculoskeletal:   (+) , , other musculoskeletal- torn meniscus of knee      GI/Hepatic:     (+) GERD Asymptomatic on medication,       Renal/Genitourinary:     (+) Other Renal/ Genitourinary, h/o ureterolithiasis      Endo:    "  (+) Obesity, Other Endocrine Disorder pituitary adenoma, Cushing's Disease, osteoporosis.      Psychiatric:  - neg psychiatric ROS       Infectious Disease:  - neg infectious disease ROS       Malignancy:      - no malignancy   Other:    (+) No chance of pregnancy C-spine cleared: N/A, no H/O Chronic Pain,no other significant disability                        PHYSICAL EXAM:   Mental Status/Neuro: A/A/O; Age Appropriate   Airway: Facies: Feasible  Mallampati: III  Mouth/Opening: Full  TM distance: > 6 cm  Neck ROM: Full   Respiratory: Auscultation: CTAB     Resp. Rate: Normal     Resp. Effort: Normal      CV: Rhythm: Regular  Rate: Age appropriate  Heart: Normal Sounds   Comments:      Dental: Normal                  Lab Results   Component Value Date    WBC 7.4 11/16/2018    HGB 14.7 11/16/2018    HCT 46.8 11/16/2018     11/16/2018     12/03/2013    POTASSIUM 3.9 12/03/2013    CR 0.74 11/16/2018    GLC 78 09/04/2018    KI 9.2 09/04/2018    PHOS 3.1 09/04/2018    ALBUMIN 4.5 02/04/2010    PTT 23 11/16/2018    INR 0.98 11/16/2018    TSH 1.38 09/04/2018    T4 0.84 09/04/2018    T3 99 12/03/2013       Preop Vitals  BP Readings from Last 3 Encounters:   02/06/19 128/84   01/08/19 125/84   11/16/18 106/64    Pulse Readings from Last 3 Encounters:   02/06/19 67   01/08/19 66   11/16/18 71      Resp Readings from Last 3 Encounters:   02/06/19 16   11/16/18 17    SpO2 Readings from Last 3 Encounters:   02/06/19 98%   11/16/18 97%   10/09/18 100%      Temp Readings from Last 1 Encounters:   02/06/19 98.1  F (36.7  C) (Oral)    Ht Readings from Last 1 Encounters:   01/08/19 1.6 m (5' 3\")      Wt Readings from Last 1 Encounters:   02/06/19 85.8 kg (189 lb 3.2 oz)    Estimated body mass index is 33.52 kg/m  as calculated from the following:    Height as of 1/8/19: 1.6 m (5' 3\").    Weight as of 2/6/19: 85.8 kg (189 lb 3.2 oz).     LDA:  Left Groin Interventional Procedure Access (Active)   Number of days: 110     "   Right Groin Interventional Procedure Access (Active)   Number of days: 110            JZG FV AN PLAN NO PONV RULE         PAC Discussion and Assessment    ASA Classification: 2  Case is suitable for:   Anesthetic techniques and relevant risks discussed: GA  Invasive monitoring and risk discussed: No  Types:   Possibility and Risk of blood transfusion discussed: Yes  NPO instructions given:   Additional anesthetic preparation and risks discussed:   Needs early admission to pre-op area:   Other:     PAC Resident/NP Anesthesia Assessment:  Francis Flores is a 54 year old female scheduled to undergo Stealth Assisted Endoscopic Transnasal Resection of Pituitary Tumor. She has osteoporosis, Cushing's disease and GERD.      She has the following specific operative considerations:   - RCRI : No serious cardiac risks.  0.4% risk of major adverse cardiac event.   - VTE risk: 0.26%  - NOEMI # of risks 1/8 = low risk  - Risk of PONV score = 3.  If > 2, anti-emetic intervention recommended.  - GI: will take pantoprazole and ranitidine on DOS     Type and screen drawn today.    Patient discussed with Dr. Salas    Reviewed and Signed by PAC Mid-Level Provider/Resident  Mid-Level Provider/Resident: MIREYA Monet, JOSÉ  Date: 3/6/19  Time: 3:00pm    Attending Anesthesiologist Anesthesia Assessment:  Otherwise healthy female without significant underlying medical conditions.  No further evaluation necessary prior to surgery    Reviewed and Signed by PAC Anesthesiologist  Anesthesiologist: Maritza  Date: March 6, 2019  Time:   Pass/Fail: Pass  Disposition:     PAC Pharmacist Assessment:        Pharmacist:   Date:   Time:        MIREYA Mcfarlane

## 2019-03-07 NOTE — PROGRESS NOTES
Otolaryngology Adult Consultation    Patient: Francis Flores   : 1965     Patient presents with:  Consult:   Chief Complaint   Patient presents with     Consult     discuss pituitary surgery         Referring Provider: Derrell Reynolds*   Consulting Physician:  Leidy Jeong MD       Assessment/Plan: Patient with known pituitary macroadenoma, and evidence of Cushing's disease.  We discussed the surgical approach.  I answered her questions to the best of my ability.      HPI: Francis Flores is a 54 year old female seen today in the Otolaryngology Clinic for a history of known pituitary adenoma diagnosed in . She underwent petrosal sampling showing Cushing's disease.  She has osteoporosis, which is likely related. She does not complain of sinus or nasal issues.     Current Outpatient Medications on File Prior to Visit:  calcium carbonate (OS- MG [place]. [place]) 500 tablet Take 1 tablet by mouth 2 times daily   ibuprofen 200 MG capsule Take 400 mg by mouth every 4 hours as needed for fever   pantoprazole sodium 40 MG tablet Take 1 packet by mouth daily.   ranitidine (ZANTAC) 150 MG tablet Take 1 tablet by mouth 2 times daily.   VITAMIN D, CHOLECALCIFEROL, PO Take 2,000 Units by mouth daily     No current facility-administered medications on file prior to visit.      No Known Allergies    Past Medical History:   Diagnosis Date     GERD (gastroesophageal reflux disease)      Osteoporosis      Pituitary adenoma (H) 2012         Review of Systems   ENT ROS 3/6/2019   Eyes Visual loss   Ears, Nose, Throat Hearing loss, Ringing/noise in ears   Gastrointestinal/Genitourinary Heartburn/indigestion        14 point ROS neg other than the symptoms noted above.    Physical Exam:    General Assessment   The patient is alert, oriented and in no acute distress.   Head/Face  Grossly normal.   Nose  External nose is straight, skin is normal. Intranasal exam (anterior rhinoscopy) reveals normal moist  mucosa, turbinate tissue without edema, erythema or crusting.  Septum mainly in the midline.      MRI  Impression: No change in pituitary mass extending into the left  cavernous sinus compatible with known pituitary macroadenoma.

## 2019-03-24 ENCOUNTER — ANESTHESIA EVENT (OUTPATIENT)
Dept: SURGERY | Facility: CLINIC | Age: 54
End: 2019-03-24
Payer: COMMERCIAL

## 2019-03-25 ENCOUNTER — HOSPITAL ENCOUNTER (OUTPATIENT)
Dept: MRI IMAGING | Facility: CLINIC | Age: 54
End: 2019-03-25
Attending: NEUROLOGICAL SURGERY | Admitting: NEUROLOGICAL SURGERY
Payer: COMMERCIAL

## 2019-03-25 ENCOUNTER — HOSPITAL ENCOUNTER (OUTPATIENT)
Dept: CT IMAGING | Facility: CLINIC | Age: 54
End: 2019-03-25
Attending: NEUROLOGICAL SURGERY | Admitting: NEUROLOGICAL SURGERY
Payer: COMMERCIAL

## 2019-03-25 ENCOUNTER — OFFICE VISIT (OUTPATIENT)
Dept: INTERPRETER SERVICES | Facility: CLINIC | Age: 54
End: 2019-03-25
Payer: COMMERCIAL

## 2019-03-25 ENCOUNTER — HOSPITAL ENCOUNTER (INPATIENT)
Facility: CLINIC | Age: 54
LOS: 3 days | Discharge: HOME | End: 2019-03-28
Attending: NEUROLOGICAL SURGERY | Admitting: NEUROLOGICAL SURGERY
Payer: COMMERCIAL

## 2019-03-25 ENCOUNTER — ANESTHESIA (OUTPATIENT)
Dept: SURGERY | Facility: CLINIC | Age: 54
End: 2019-03-25
Payer: COMMERCIAL

## 2019-03-25 DIAGNOSIS — E24.0 CUSHING'S DISEASE (H): Primary | ICD-10-CM

## 2019-03-25 DIAGNOSIS — D35.2 PITUITARY ADENOMA (H): ICD-10-CM

## 2019-03-25 DIAGNOSIS — D35.2 BENIGN NEOPLASM OF PITUITARY GLAND AND CRANIOPHARYNGEAL DUCT (POUCH) (H): ICD-10-CM

## 2019-03-25 DIAGNOSIS — D35.3 BENIGN NEOPLASM OF PITUITARY GLAND AND CRANIOPHARYNGEAL DUCT (POUCH) (H): ICD-10-CM

## 2019-03-25 LAB
ABO + RH BLD: NORMAL
ABO + RH BLD: NORMAL
ANION GAP SERPL CALCULATED.3IONS-SCNC: 7 MMOL/L (ref 3–14)
BASE DEFICIT BLDA-SCNC: 2.6 MMOL/L
BASE DEFICIT BLDA-SCNC: 4.1 MMOL/L
BLD GP AB SCN SERPL QL: NORMAL
BLOOD BANK CMNT PATIENT-IMP: NORMAL
BLOOD BANK CMNT PATIENT-IMP: NORMAL
BUN SERPL-MCNC: 6 MG/DL (ref 7–30)
CA-I BLD-MCNC: 4.4 MG/DL (ref 4.4–5.2)
CA-I BLD-MCNC: 4.6 MG/DL (ref 4.4–5.2)
CALCIUM SERPL-MCNC: 6.8 MG/DL (ref 8.5–10.1)
CHLORIDE SERPL-SCNC: 118 MMOL/L (ref 94–109)
CO2 SERPL-SCNC: 19 MMOL/L (ref 20–32)
CORTIS SERPL-MCNC: 23.1 UG/DL (ref 4–22)
CREAT SERPL-MCNC: 0.5 MG/DL (ref 0.52–1.04)
GFR SERPL CREATININE-BSD FRML MDRD: >90 ML/MIN/{1.73_M2}
GLUCOSE BLD-MCNC: 150 MG/DL (ref 70–99)
GLUCOSE BLD-MCNC: 155 MG/DL (ref 70–99)
GLUCOSE BLDC GLUCOMTR-MCNC: 114 MG/DL (ref 70–99)
GLUCOSE BLDC GLUCOMTR-MCNC: 86 MG/DL (ref 70–99)
GLUCOSE SERPL-MCNC: 123 MG/DL (ref 70–99)
HCO3 BLD-SCNC: 21 MMOL/L (ref 21–28)
HCO3 BLD-SCNC: 23 MMOL/L (ref 21–28)
HGB BLD-MCNC: 11.8 G/DL (ref 11.7–15.7)
HGB BLD-MCNC: 13.2 G/DL (ref 11.7–15.7)
LACTATE BLD-SCNC: 1.7 MMOL/L (ref 0.7–2)
O2/TOTAL GAS SETTING VFR VENT: 36 %
O2/TOTAL GAS SETTING VFR VENT: 36 %
PCO2 BLD: 39 MM HG (ref 35–45)
PCO2 BLD: 40 MM HG (ref 35–45)
PH BLD: 7.34 PH (ref 7.35–7.45)
PH BLD: 7.36 PH (ref 7.35–7.45)
PO2 BLD: 131 MM HG (ref 80–105)
PO2 BLD: 95 MM HG (ref 80–105)
POTASSIUM BLD-SCNC: 3.4 MMOL/L (ref 3.4–5.3)
POTASSIUM BLD-SCNC: 4.2 MMOL/L (ref 3.4–5.3)
POTASSIUM SERPL-SCNC: 4 MMOL/L (ref 3.4–5.3)
SODIUM BLD-SCNC: 142 MMOL/L (ref 133–144)
SODIUM BLD-SCNC: 143 MMOL/L (ref 133–144)
SODIUM SERPL-SCNC: 144 MMOL/L (ref 133–144)
SPECIMEN EXP DATE BLD: NORMAL
TROPONIN I SERPL-MCNC: <0.015 UG/L (ref 0–0.04)

## 2019-03-25 PROCEDURE — 83605 ASSAY OF LACTIC ACID: CPT | Performed by: ANESTHESIOLOGY

## 2019-03-25 PROCEDURE — 40000171 ZZH STATISTIC PRE-PROCEDURE ASSESSMENT III: Performed by: NEUROLOGICAL SURGERY

## 2019-03-25 PROCEDURE — 88342 IMHCHEM/IMCYTCHM 1ST ANTB: CPT | Performed by: NEUROLOGICAL SURGERY

## 2019-03-25 PROCEDURE — 25800030 ZZH RX IP 258 OP 636: Performed by: STUDENT IN AN ORGANIZED HEALTH CARE EDUCATION/TRAINING PROGRAM

## 2019-03-25 PROCEDURE — 25500064 ZZH RX 255 OP 636: Performed by: NEUROLOGICAL SURGERY

## 2019-03-25 PROCEDURE — 0GT00ZZ RESECTION OF PITUITARY GLAND, OPEN APPROACH: ICD-10-PCS | Performed by: NEUROLOGICAL SURGERY

## 2019-03-25 PROCEDURE — 84295 ASSAY OF SERUM SODIUM: CPT | Performed by: ANESTHESIOLOGY

## 2019-03-25 PROCEDURE — 40000275 ZZH STATISTIC RCP TIME EA 10 MIN

## 2019-03-25 PROCEDURE — 36000076 ZZH SURGERY LEVEL 6 EA 15 ADDTL MIN - UMMC: Performed by: NEUROLOGICAL SURGERY

## 2019-03-25 PROCEDURE — T1013 SIGN LANG/ORAL INTERPRETER: HCPCS | Mod: U3

## 2019-03-25 PROCEDURE — 00U20JZ SUPPLEMENT DURA MATER WITH SYNTHETIC SUBSTITUTE, OPEN APPROACH: ICD-10-PCS | Performed by: NEUROLOGICAL SURGERY

## 2019-03-25 PROCEDURE — 8E09XBZ COMPUTER ASSISTED PROCEDURE OF HEAD AND NECK REGION: ICD-10-PCS | Performed by: NEUROLOGICAL SURGERY

## 2019-03-25 PROCEDURE — 93005 ELECTROCARDIOGRAM TRACING: CPT

## 2019-03-25 PROCEDURE — 70450 CT HEAD/BRAIN W/O DYE: CPT

## 2019-03-25 PROCEDURE — 25000125 ZZHC RX 250: Performed by: NEUROLOGICAL SURGERY

## 2019-03-25 PROCEDURE — 37000008 ZZH ANESTHESIA TECHNICAL FEE, 1ST 30 MIN: Performed by: NEUROLOGICAL SURGERY

## 2019-03-25 PROCEDURE — 25000132 ZZH RX MED GY IP 250 OP 250 PS 637: Performed by: STUDENT IN AN ORGANIZED HEALTH CARE EDUCATION/TRAINING PROGRAM

## 2019-03-25 PROCEDURE — 25000128 H RX IP 250 OP 636: Performed by: STUDENT IN AN ORGANIZED HEALTH CARE EDUCATION/TRAINING PROGRAM

## 2019-03-25 PROCEDURE — 37000009 ZZH ANESTHESIA TECHNICAL FEE, EACH ADDTL 15 MIN: Performed by: NEUROLOGICAL SURGERY

## 2019-03-25 PROCEDURE — 71000017 ZZH RECOVERY PHASE 1 LEVEL 3 EA ADDTL HR: Performed by: NEUROLOGICAL SURGERY

## 2019-03-25 PROCEDURE — 36000074 ZZH SURGERY LEVEL 6 1ST 30 MIN - UMMC: Performed by: NEUROLOGICAL SURGERY

## 2019-03-25 PROCEDURE — 70552 MRI BRAIN STEM W/DYE: CPT

## 2019-03-25 PROCEDURE — 20000004 ZZH R&B ICU UMMC

## 2019-03-25 PROCEDURE — 82533 TOTAL CORTISOL: CPT | Performed by: STUDENT IN AN ORGANIZED HEALTH CARE EDUCATION/TRAINING PROGRAM

## 2019-03-25 PROCEDURE — 00000146 ZZHCL STATISTIC GLUCOSE BY METER IP

## 2019-03-25 PROCEDURE — 25000128 H RX IP 250 OP 636: Performed by: NEUROLOGICAL SURGERY

## 2019-03-25 PROCEDURE — 82330 ASSAY OF CALCIUM: CPT | Performed by: ANESTHESIOLOGY

## 2019-03-25 PROCEDURE — 71000016 ZZH RECOVERY PHASE 1 LEVEL 3 FIRST HR: Performed by: NEUROLOGICAL SURGERY

## 2019-03-25 PROCEDURE — 82947 ASSAY GLUCOSE BLOOD QUANT: CPT | Performed by: ANESTHESIOLOGY

## 2019-03-25 PROCEDURE — 82803 BLOOD GASES ANY COMBINATION: CPT | Performed by: ANESTHESIOLOGY

## 2019-03-25 PROCEDURE — 40000014 ZZH STATISTIC ARTERIAL MONITORING DAILY

## 2019-03-25 PROCEDURE — 27210995 ZZH RX 272: Performed by: NEUROLOGICAL SURGERY

## 2019-03-25 PROCEDURE — 25000125 ZZHC RX 250: Performed by: STUDENT IN AN ORGANIZED HEALTH CARE EDUCATION/TRAINING PROGRAM

## 2019-03-25 PROCEDURE — 80048 BASIC METABOLIC PNL TOTAL CA: CPT | Performed by: STUDENT IN AN ORGANIZED HEALTH CARE EDUCATION/TRAINING PROGRAM

## 2019-03-25 PROCEDURE — A9585 GADOBUTROL INJECTION: HCPCS | Performed by: NEUROLOGICAL SURGERY

## 2019-03-25 PROCEDURE — 84484 ASSAY OF TROPONIN QUANT: CPT | Performed by: ANESTHESIOLOGY

## 2019-03-25 PROCEDURE — 93010 ELECTROCARDIOGRAM REPORT: CPT | Performed by: INTERNAL MEDICINE

## 2019-03-25 PROCEDURE — 25000566 ZZH SEVOFLURANE, EA 15 MIN: Performed by: NEUROLOGICAL SURGERY

## 2019-03-25 PROCEDURE — 88305 TISSUE EXAM BY PATHOLOGIST: CPT | Performed by: NEUROLOGICAL SURGERY

## 2019-03-25 PROCEDURE — 84132 ASSAY OF SERUM POTASSIUM: CPT | Performed by: ANESTHESIOLOGY

## 2019-03-25 PROCEDURE — 25800025 ZZH RX 258: Performed by: NEUROLOGICAL SURGERY

## 2019-03-25 PROCEDURE — 88307 TISSUE EXAM BY PATHOLOGIST: CPT | Performed by: NEUROLOGICAL SURGERY

## 2019-03-25 PROCEDURE — 27210794 ZZH OR GENERAL SUPPLY STERILE: Performed by: NEUROLOGICAL SURGERY

## 2019-03-25 PROCEDURE — 88311 DECALCIFY TISSUE: CPT | Performed by: NEUROLOGICAL SURGERY

## 2019-03-25 PROCEDURE — C1762 CONN TISS, HUMAN(INC FASCIA): HCPCS | Performed by: NEUROLOGICAL SURGERY

## 2019-03-25 DEVICE — GRAFT DUREPAIR DURA MATRIX 2X2" 62100: Type: IMPLANTABLE DEVICE | Site: BRAIN | Status: FUNCTIONAL

## 2019-03-25 RX ORDER — OXYMETAZOLINE HYDROCHLORIDE 0.05 G/100ML
SPRAY NASAL PRN
Status: DISCONTINUED | OUTPATIENT
Start: 2019-03-25 | End: 2019-03-26 | Stop reason: HOSPADM

## 2019-03-25 RX ORDER — SODIUM CHLORIDE, SODIUM LACTATE, POTASSIUM CHLORIDE, CALCIUM CHLORIDE 600; 310; 30; 20 MG/100ML; MG/100ML; MG/100ML; MG/100ML
INJECTION, SOLUTION INTRAVENOUS CONTINUOUS
Status: DISCONTINUED | OUTPATIENT
Start: 2019-03-25 | End: 2019-03-26 | Stop reason: HOSPADM

## 2019-03-25 RX ORDER — MAGNESIUM SULFATE HEPTAHYDRATE 40 MG/ML
INJECTION, SOLUTION INTRAVENOUS PRN
Status: DISCONTINUED | OUTPATIENT
Start: 2019-03-25 | End: 2019-03-25

## 2019-03-25 RX ORDER — FENTANYL CITRATE 50 UG/ML
INJECTION, SOLUTION INTRAMUSCULAR; INTRAVENOUS PRN
Status: DISCONTINUED | OUTPATIENT
Start: 2019-03-25 | End: 2019-03-25

## 2019-03-25 RX ORDER — KETOROLAC TROMETHAMINE 30 MG/ML
INJECTION, SOLUTION INTRAMUSCULAR; INTRAVENOUS PRN
Status: DISCONTINUED | OUTPATIENT
Start: 2019-03-25 | End: 2019-03-25

## 2019-03-25 RX ORDER — CEFAZOLIN SODIUM 2 G/100ML
2 INJECTION, SOLUTION INTRAVENOUS
Status: DISCONTINUED | OUTPATIENT
Start: 2019-03-25 | End: 2019-03-25 | Stop reason: HOSPADM

## 2019-03-25 RX ORDER — KETOROLAC TROMETHAMINE 30 MG/ML
30 INJECTION, SOLUTION INTRAMUSCULAR; INTRAVENOUS EVERY 6 HOURS PRN
Status: DISCONTINUED | OUTPATIENT
Start: 2019-03-25 | End: 2019-03-26 | Stop reason: HOSPADM

## 2019-03-25 RX ORDER — SODIUM CHLORIDE 9 MG/ML
INJECTION, SOLUTION INTRAVENOUS CONTINUOUS
Status: DISCONTINUED | OUTPATIENT
Start: 2019-03-25 | End: 2019-03-26

## 2019-03-25 RX ORDER — SODIUM CHLORIDE 9 MG/ML
INJECTION, SOLUTION INTRAVENOUS CONTINUOUS PRN
Status: DISCONTINUED | OUTPATIENT
Start: 2019-03-25 | End: 2019-03-25

## 2019-03-25 RX ORDER — DEXMEDETOMIDINE HYDROCHLORIDE 4 UG/ML
0.2-1.2 INJECTION, SOLUTION INTRAVENOUS CONTINUOUS
Status: DISCONTINUED | OUTPATIENT
Start: 2019-03-25 | End: 2019-03-26 | Stop reason: HOSPADM

## 2019-03-25 RX ORDER — FENTANYL CITRATE 50 UG/ML
25-50 INJECTION, SOLUTION INTRAMUSCULAR; INTRAVENOUS
Status: CANCELLED | OUTPATIENT
Start: 2019-03-25

## 2019-03-25 RX ORDER — HYDROMORPHONE HYDROCHLORIDE 1 MG/ML
.3-.5 INJECTION, SOLUTION INTRAMUSCULAR; INTRAVENOUS; SUBCUTANEOUS EVERY 5 MIN PRN
Status: CANCELLED | OUTPATIENT
Start: 2019-03-25

## 2019-03-25 RX ORDER — SODIUM CHLORIDE, SODIUM LACTATE, POTASSIUM CHLORIDE, AND CALCIUM CHLORIDE .6; .31; .03; .02 G/100ML; G/100ML; G/100ML; G/100ML
IRRIGANT IRRIGATION PRN
Status: DISCONTINUED | OUTPATIENT
Start: 2019-03-25 | End: 2019-03-26 | Stop reason: HOSPADM

## 2019-03-25 RX ORDER — ACETAMINOPHEN 325 MG/1
975 TABLET ORAL ONCE
Status: COMPLETED | OUTPATIENT
Start: 2019-03-25 | End: 2019-03-25

## 2019-03-25 RX ORDER — CEFAZOLIN SODIUM 1 G/3ML
1 INJECTION, POWDER, FOR SOLUTION INTRAMUSCULAR; INTRAVENOUS SEE ADMIN INSTRUCTIONS
Status: DISCONTINUED | OUTPATIENT
Start: 2019-03-25 | End: 2019-03-25 | Stop reason: HOSPADM

## 2019-03-25 RX ORDER — ONDANSETRON 4 MG/1
4 TABLET, ORALLY DISINTEGRATING ORAL EVERY 30 MIN PRN
Status: DISCONTINUED | OUTPATIENT
Start: 2019-03-25 | End: 2019-03-26 | Stop reason: HOSPADM

## 2019-03-25 RX ORDER — POTASSIUM CHLORIDE 29.8 MG/ML
INJECTION INTRAVENOUS PRN
Status: DISCONTINUED | OUTPATIENT
Start: 2019-03-25 | End: 2019-03-25

## 2019-03-25 RX ORDER — GADOBUTROL 604.72 MG/ML
10 INJECTION INTRAVENOUS ONCE
Status: COMPLETED | OUTPATIENT
Start: 2019-03-25 | End: 2019-03-25

## 2019-03-25 RX ORDER — ESMOLOL HYDROCHLORIDE 10 MG/ML
INJECTION INTRAVENOUS PRN
Status: DISCONTINUED | OUTPATIENT
Start: 2019-03-25 | End: 2019-03-25

## 2019-03-25 RX ORDER — ONDANSETRON 2 MG/ML
INJECTION INTRAMUSCULAR; INTRAVENOUS PRN
Status: DISCONTINUED | OUTPATIENT
Start: 2019-03-25 | End: 2019-03-25

## 2019-03-25 RX ORDER — LIDOCAINE HYDROCHLORIDE AND EPINEPHRINE 10; 10 MG/ML; UG/ML
INJECTION, SOLUTION INFILTRATION; PERINEURAL PRN
Status: DISCONTINUED | OUTPATIENT
Start: 2019-03-25 | End: 2019-03-26 | Stop reason: HOSPADM

## 2019-03-25 RX ORDER — EPHEDRINE SULFATE 50 MG/ML
INJECTION, SOLUTION INTRAMUSCULAR; INTRAVENOUS; SUBCUTANEOUS PRN
Status: DISCONTINUED | OUTPATIENT
Start: 2019-03-25 | End: 2019-03-25

## 2019-03-25 RX ORDER — ONDANSETRON 2 MG/ML
4 INJECTION INTRAMUSCULAR; INTRAVENOUS EVERY 30 MIN PRN
Status: DISCONTINUED | OUTPATIENT
Start: 2019-03-25 | End: 2019-03-26 | Stop reason: HOSPADM

## 2019-03-25 RX ORDER — LABETALOL HYDROCHLORIDE 5 MG/ML
INJECTION, SOLUTION INTRAVENOUS PRN
Status: DISCONTINUED | OUTPATIENT
Start: 2019-03-25 | End: 2019-03-25

## 2019-03-25 RX ORDER — PROPOFOL 10 MG/ML
INJECTION, EMULSION INTRAVENOUS PRN
Status: DISCONTINUED | OUTPATIENT
Start: 2019-03-25 | End: 2019-03-25

## 2019-03-25 RX ORDER — CEFTRIAXONE 2 G/1
INJECTION, POWDER, FOR SOLUTION INTRAMUSCULAR; INTRAVENOUS PRN
Status: DISCONTINUED | OUTPATIENT
Start: 2019-03-25 | End: 2019-03-25

## 2019-03-25 RX ORDER — ACETAMINOPHEN 325 MG/1
650 TABLET ORAL EVERY 4 HOURS PRN
Status: DISCONTINUED | OUTPATIENT
Start: 2019-03-25 | End: 2019-03-28 | Stop reason: HOSPADM

## 2019-03-25 RX ADMIN — PHENYLEPHRINE HYDROCHLORIDE 25 MCG: 10 INJECTION, SOLUTION INTRAMUSCULAR; INTRAVENOUS; SUBCUTANEOUS at 12:12

## 2019-03-25 RX ADMIN — ROCURONIUM BROMIDE 10 MG: 10 INJECTION INTRAVENOUS at 11:47

## 2019-03-25 RX ADMIN — ONDANSETRON 4 MG: 2 INJECTION INTRAMUSCULAR; INTRAVENOUS at 13:29

## 2019-03-25 RX ADMIN — ROCURONIUM BROMIDE 50 MG: 10 INJECTION INTRAVENOUS at 07:54

## 2019-03-25 RX ADMIN — SODIUM CHLORIDE: 9 INJECTION, SOLUTION INTRAVENOUS at 07:44

## 2019-03-25 RX ADMIN — ACETAMINOPHEN 975 MG: 325 TABLET, FILM COATED ORAL at 06:14

## 2019-03-25 RX ADMIN — MAGNESIUM SULFATE IN WATER 2 G: 40 INJECTION, SOLUTION INTRAVENOUS at 09:10

## 2019-03-25 RX ADMIN — PHENYLEPHRINE HYDROCHLORIDE 0.1 MCG/KG/MIN: 10 INJECTION, SOLUTION INTRAMUSCULAR; INTRAVENOUS; SUBCUTANEOUS at 12:16

## 2019-03-25 RX ADMIN — ROCURONIUM BROMIDE 20 MG: 10 INJECTION INTRAVENOUS at 08:51

## 2019-03-25 RX ADMIN — PHENYLEPHRINE HYDROCHLORIDE 50 MCG: 10 INJECTION, SOLUTION INTRAMUSCULAR; INTRAVENOUS; SUBCUTANEOUS at 11:32

## 2019-03-25 RX ADMIN — HYDROCORTISONE SODIUM SUCCINATE 50 MG: 100 INJECTION, POWDER, FOR SOLUTION INTRAMUSCULAR; INTRAVENOUS at 17:07

## 2019-03-25 RX ADMIN — PROPOFOL 50 MG: 10 INJECTION, EMULSION INTRAVENOUS at 08:10

## 2019-03-25 RX ADMIN — PROPOFOL 30 MG: 10 INJECTION, EMULSION INTRAVENOUS at 09:00

## 2019-03-25 RX ADMIN — FENTANYL CITRATE 50 MCG: 50 INJECTION, SOLUTION INTRAMUSCULAR; INTRAVENOUS at 07:54

## 2019-03-25 RX ADMIN — ESMOLOL HYDROCHLORIDE 20 MG: 10 INJECTION, SOLUTION INTRAVENOUS at 09:49

## 2019-03-25 RX ADMIN — ROCURONIUM BROMIDE 10 MG: 10 INJECTION INTRAVENOUS at 11:19

## 2019-03-25 RX ADMIN — PROPOFOL 20 MG: 10 INJECTION, EMULSION INTRAVENOUS at 09:24

## 2019-03-25 RX ADMIN — ROCURONIUM BROMIDE 10 MG: 10 INJECTION INTRAVENOUS at 09:44

## 2019-03-25 RX ADMIN — ROCURONIUM BROMIDE 10 MG: 10 INJECTION INTRAVENOUS at 10:19

## 2019-03-25 RX ADMIN — Medication 5 MG: at 11:12

## 2019-03-25 RX ADMIN — KETOROLAC TROMETHAMINE 15 MG: 30 INJECTION, SOLUTION INTRAMUSCULAR; INTRAVENOUS at 22:05

## 2019-03-25 RX ADMIN — DEXMEDETOMIDINE HYDROCHLORIDE 0.2 MCG/KG/HR: 4 INJECTION, SOLUTION INTRAVENOUS at 08:15

## 2019-03-25 RX ADMIN — PROPOFOL 150 MG: 10 INJECTION, EMULSION INTRAVENOUS at 07:54

## 2019-03-25 RX ADMIN — PHENYLEPHRINE HYDROCHLORIDE 25 MCG: 10 INJECTION, SOLUTION INTRAMUSCULAR; INTRAVENOUS; SUBCUTANEOUS at 11:57

## 2019-03-25 RX ADMIN — LABETALOL HYDROCHLORIDE 5 MG: 5 INJECTION, SOLUTION INTRAVENOUS at 10:00

## 2019-03-25 RX ADMIN — KETOROLAC TROMETHAMINE 30 MG: 30 INJECTION, SOLUTION INTRAMUSCULAR at 13:45

## 2019-03-25 RX ADMIN — Medication 5 MG: at 09:08

## 2019-03-25 RX ADMIN — SUGAMMADEX 180 MG: 100 INJECTION, SOLUTION INTRAVENOUS at 13:29

## 2019-03-25 RX ADMIN — PHENYLEPHRINE HYDROCHLORIDE 25 MCG: 10 INJECTION, SOLUTION INTRAMUSCULAR; INTRAVENOUS; SUBCUTANEOUS at 12:05

## 2019-03-25 RX ADMIN — PHENYLEPHRINE HYDROCHLORIDE 25 MCG: 10 INJECTION, SOLUTION INTRAMUSCULAR; INTRAVENOUS; SUBCUTANEOUS at 12:17

## 2019-03-25 RX ADMIN — ROCURONIUM BROMIDE 10 MG: 10 INJECTION INTRAVENOUS at 10:45

## 2019-03-25 RX ADMIN — POTASSIUM CHLORIDE 20 MEQ: 400 INJECTION, SOLUTION INTRAVENOUS at 10:15

## 2019-03-25 RX ADMIN — PHENYLEPHRINE HYDROCHLORIDE 25 MCG: 10 INJECTION, SOLUTION INTRAMUSCULAR; INTRAVENOUS; SUBCUTANEOUS at 11:50

## 2019-03-25 RX ADMIN — ROCURONIUM BROMIDE 10 MG: 10 INJECTION INTRAVENOUS at 12:37

## 2019-03-25 RX ADMIN — PHENYLEPHRINE HYDROCHLORIDE 50 MCG: 10 INJECTION, SOLUTION INTRAMUSCULAR; INTRAVENOUS; SUBCUTANEOUS at 11:42

## 2019-03-25 RX ADMIN — PROPOFOL 30 MG: 10 INJECTION, EMULSION INTRAVENOUS at 09:47

## 2019-03-25 RX ADMIN — SODIUM CHLORIDE: 9 INJECTION, SOLUTION INTRAVENOUS at 08:00

## 2019-03-25 RX ADMIN — CEFTRIAXONE SODIUM 2 G: 2 INJECTION, POWDER, FOR SOLUTION INTRAMUSCULAR; INTRAVENOUS at 08:15

## 2019-03-25 RX ADMIN — SODIUM CHLORIDE: 9 INJECTION, SOLUTION INTRAVENOUS at 17:18

## 2019-03-25 RX ADMIN — PROPOFOL 30 MG: 10 INJECTION, EMULSION INTRAVENOUS at 08:20

## 2019-03-25 RX ADMIN — ACETAMINOPHEN 650 MG: 325 TABLET, FILM COATED ORAL at 17:58

## 2019-03-25 RX ADMIN — GADOBUTROL 8.5 ML: 604.72 INJECTION INTRAVENOUS at 07:11

## 2019-03-25 ASSESSMENT — LIFESTYLE VARIABLES: TOBACCO_USE: 0

## 2019-03-25 ASSESSMENT — MIFFLIN-ST. JEOR: SCORE: 1400.13

## 2019-03-25 ASSESSMENT — PAIN DESCRIPTION - DESCRIPTORS
DESCRIPTORS: ACHING
DESCRIPTORS: HEADACHE
DESCRIPTORS: HEADACHE

## 2019-03-25 NOTE — OR NURSING
Dr Calvo has been at bedside frequently as patient has been hypotensive in PACU, fluid bolus given for total of 1500 ml of .9 Sodium Chloride. Dose of ephedrine x2 administered by Dr. Calvo. Dr Calvo back to bedside to for US to determine fluid status.     Neurosurgery resident at bedside order for EKG and Trop-completed. Dr Calvo reviewed EKG.    Dr. Rosales updated as to status and patient complaints of RLE numbness, Will continue to monitor in PACU, no new orders at this time.    Heart function looks good per Dr Calvo, order for 500 ml normal saline bolus

## 2019-03-25 NOTE — PROGRESS NOTES
During my attempted visit, pt/family not available.     Will continue to provide support to pt/family during their hospitalization at least 1x/wk.

## 2019-03-25 NOTE — OP NOTE
Procedure Date: 03/25/2019      PREOPERATIVE DIAGNOSIS:  Cushing's disease secondary to pituitary adenoma.      POSTOPERATIVE DIAGNOSIS:  Same.     PROCEDURE PERFORMED: 1. Endoscopic transnasal resection of pituitary tumor. 2. Frameless stereotactic neuronavigation.     ATTENDING SURGEON:  Derrell Crain MD     RESIDENT SURGEON:  Abby Landa MD     INDICATIONS FOR PROCEDURE:  Ms. Francis Flores is a 54-year-old woman who was referred to our clinic with signs and symptoms of Cushing's disease. Her pituitary macroadenoma was diagnosed in 2008.  She was apparently offered resection of the tumor, but due to miscommunication with the surgeon, it was never performed. She has been evaluated elsewhere for Cushing's disease and was apparently told that her tumor had regressed and that she did not have Cushing's disease. However, recent re-evaluation by our endocrinologists confirmed that she has active Cushing's disease. The MRI shows a tumor in the left side of the sella with left parasellar extension; thus she presented today for the above-mentioned procedure.      DESCRIPTION OF PROCEDURE:  After informed consent was obtained (through a [employer] ), the patient was taken to the operating suite, where general endotracheal anesthesia was induced.  She was transferred to the operating table in the supine position.  Her head was affixed in the Richardson head sandy after induction of general endotracheal anesthesia.  All pressure points were padded adequately for the duration of the case.  Her head was registered to the neuronavigation system and appropriate lines were placed by our anesthesia colleagues.  Our ENT collegaues provided exposure to the floor of the sella.  Please see their operative note for the full description of the procedure.      After they had exposed the floor of the sella, we removed the bone under endoscopic visualization using the drill and Kerrison punches. We used the neuronavigation  to define the limits of bone removal; then, we opened the dura of the floor of the sella in a cruciate fashion using bayoneted 11 blades.  We could see tumor erosion of the inferior sella and posterior sella.  We then used a combination of ring curettes to remove the tumor, most of which was soft. We could see the arachnoid coming down.  We continued to resect tumor using until we were able to find the normal gland on the right.  On continued dissection around the gland, we did see that there was tumor invading the posterior sella. The eroded bone was curetted out, triggering extensive intercavernous sinus bleeding. This was controlled with Gelfoam slurry and tamponade. The patient had a five-second episode of asystole during the tamponade, and this resolved spontaneously. We worked behind the left cavernous carotid artery and removed additional tumor with side angle curettes. The specimens were sent for permanent pathology. We were able to get all of the visible tumor out of the sella; however, there was concern for further invasion into the dura. Thus, we cut out some of the leaflets of the dura and coagulated the remaining dura.  There was no obvious CSF leak. We then placed pledgets of Gelfoam soaked in hydrogen peroxide in the cavity, then removed these and obtained hemostasis, and the ENT surgeons returned to close.  After ENT closure, she was removed from the Richardson, extubated, and taken to PACU in stable condition.     Blood loss was approximately 200 mL.        CANDIDA BAEZ MD       As dictated by AKASH DELGADO MD        ATTESTATION: My signature attests that I was present for the key portions and immediately available for the entire operation described above. I agree with the above findings.    CANDIDA BAEZ MD     D: 2019   T: 2019   MT: WT      Name:     VINCE THOMPSON   MRN:      [id number]        Account:        [id number]   :      1965            Procedure Date: 03/25/2019      Document: [id number]

## 2019-03-25 NOTE — BRIEF OP NOTE
[facility], [place]    Brief Operative Note    Pre-operative diagnosis: Pituitary-Dependent Cushing's Disease  Post-operative diagnosis * No post-op diagnosis entered *  Procedure: Procedure(s):  Stealth Assisted Endoscopic Transnasal Resection Of Pituitary Tumor  Surgeon: Surgeon(s) and Role:     * Derrell Crain MD - Primary     * Leidy Jeong MD - Assisting     * Mitch Myles - Resident - Assisting     * Abby Landa MD - Resident - Assisting  Anesthesia: General   Estimated blood loss: 100 cc  Drains: None  Specimens:   ID Type Source Tests Collected by Time Destination   A : Clivus bone Bone Cranium SURGICAL PATHOLOGY EXAM Derrell Crain MD 3/25/2019 12:21 PM    B : Dura Tissue Brain SURGICAL PATHOLOGY EXAM Derrell Crain MD 3/25/2019 12:30 PM    C : Pituitary tumor Tissue Pituitary Gland SURGICAL PATHOLOGY EXAM Derrell Crain MD 3/25/2019 12:32 PM      Findings:   Tumor, no csf leak.  Complications: None.  Implants: None    Plan:  Admit to 6a  No narcotics, plan for tylenol  OK for toradol 3/26  No steroids  Am cortisol level  strict I/o  Sinus precautions

## 2019-03-25 NOTE — ANESTHESIA CARE TRANSFER NOTE
Patient: Francis Flores    Procedure(s):  Stealth Assisted Endoscopic Transnasal Resection Of Pituitary Tumor    Diagnosis: Pituitary-Dependent Cushing's Disease  Diagnosis Additional Information: No value filed.    Anesthesia Type:   No value filed.     Note:  Airway :Face Mask  Patient transferred to:PACU  Handoff Report: Identifed the Patient, Identified the Reponsible Provider, Reviewed the pertinent medical history, Discussed the surgical course, Reviewed Intra-OP anesthesia mangement and issues during anesthesia, Set expectations for post-procedure period and Allowed opportunity for questions and acknowledgement of understanding      Vitals: (Last set prior to Anesthesia Care Transfer)    CRNA VITALS  3/25/2019 1304 - 3/25/2019 1348      3/25/2019             Pulse:  69    SpO2:  100 %                Electronically Signed By: Marsha Stark MD  March 25, 2019  1:48 PM

## 2019-03-25 NOTE — ANESTHESIA PROCEDURE NOTES
Arterial Line Procedure Note  Staff:     Anesthesiologist:  Crystal Calvo MD    Resident/CRNA:  Marsha Stark MD    Arterial line performed by resident/CRNA in presence of a teaching physician    Location: In OR After Induction  Procedure Start/Stop Times:     patient identified, IV checked, site marked, risks and benefits discussed, informed consent, monitors and equipment checked, pre-op evaluation and at physician/surgeon's request      Correct Patient: Yes      Correct Position: Yes      Correct Site: Yes      Correct Procedure: Yes      Correct Laterality:  Yes    Site Marked:  Yes  Line Placement:     Procedure:  Arterial Line    Insertion Site:  Radial    Insertion laterality:  Left    Skin Prep: Chloraprep      Patient Prep: patient draped, mask, sterile gloves, hat and hand hygiene      Local skin infiltration:  None    Ultrasound Guided?: No      Catheter size:  20 gauge, 12 cm    Cath secured with: suture      Dressing:  Tegaderm    Complications:  None obvious    Arterial waveform: Yes      IBP within 10% of NIBP: Yes

## 2019-03-25 NOTE — ANESTHESIA PREPROCEDURE EVALUATION
Anesthesia Pre-Procedure Evaluation    Patient: Francis Flores   MRN:     [id number] Gender:   female   Age:    54 year old :      1965        Preoperative Diagnosis: * No surgery found *        Past Medical History:   Diagnosis Date     GERD (gastroesophageal reflux disease)      Osteoporosis      Pituitary adenoma (H) 2012      Past Surgical History:   Procedure Laterality Date     CYSTOSCOPY BLADDER WITH STONE EXTRACTION Right      IR CAROTID CEREBRAL ANGIOGRAM BILATERAL  2018     ORTHOPEDIC SURGERY      knee          Anesthesia Evaluation     . Pt has had prior anesthetic. Type: MAC and General    No history of anesthetic complications          ROS/MED HX    ENT/Pulmonary:      (-) tobacco use, asthma and NOEMI risk factors   Neurologic:  - neg neurologic ROS     Cardiovascular:  - neg cardiovascular ROS   (+) ----. : . . . :. . Previous cardiac testing date:results:Stress Testdate:3/14/18 results:Pre-exercise: Baseline LV size and systolic function appear normal. Ejection fraction estimated at 55%. Wall thickness appears normal. Left atrial size appears normal. With prescreening color flow Doppler exam there is no clinically significant valve disease.      Post-exercise:  Immediate post-exercise images obtained within 90 seconds demonstrate some increase in contractility in all visualized segments.  The ejection fraction improves to 65%.  There are no stress-induced regional wall motion abnormalities seen.      CONCLUSION:  1. Normal stress echocardiogram with no evidence of myocardial ischemia.    2. Normal baseline LV size and systolic function.    3. No significant valve disease.     date: results: date: results:          METS/Exercise Tolerance:  3 - Able to walk 1-2 blocks without stopping   Hematologic:     (+) History of Transfusion no previous transfusion reaction -      Musculoskeletal:   (+) , , other musculoskeletal- torn meniscus of knee      GI/Hepatic:     (+) GERD Asymptomatic  "on medication,       Renal/Genitourinary:     (+) Other Renal/ Genitourinary, h/o ureterolithiasis      Endo:     (+) Obesity, Other Endocrine Disorder pituitary adenoma, Cushing's Disease, osteoporosis.      Psychiatric:  - neg psychiatric ROS       Infectious Disease:  - neg infectious disease ROS       Malignancy:      - no malignancy   Other:    (+) No chance of pregnancy C-spine cleared: N/A, no H/O Chronic Pain,no other significant disability                        PHYSICAL EXAM:   Mental Status/Neuro: A/A/O; Age Appropriate   Airway: Facies: Feasible  Mallampati: III  Mouth/Opening: Full  TM distance: > 6 cm  Neck ROM: Full   Respiratory: Auscultation: CTAB     Resp. Rate: Normal     Resp. Effort: Normal      CV: Rhythm: Regular  Rate: Age appropriate  Heart: Normal Sounds   Comments:      Dental: Normal                  Lab Results   Component Value Date    WBC 5.9 03/06/2019    HGB 14.7 03/06/2019    HCT 48.3 (H) 03/06/2019     03/06/2019     03/06/2019    POTASSIUM 3.8 03/06/2019    CHLORIDE 107 03/06/2019    CO2 26 03/06/2019    BUN 8 03/06/2019    CR 0.64 03/06/2019    GLC 81 03/06/2019    KI 9.1 03/06/2019    PHOS 3.1 09/04/2018    ALBUMIN 4.5 02/04/2010    PTT 23 11/16/2018    INR 0.98 11/16/2018    TSH 1.38 09/04/2018    T4 0.84 09/04/2018    T3 99 12/03/2013       Preop Vitals  BP Readings from Last 3 Encounters:   03/06/19 127/83   03/06/19 139/88   02/06/19 128/84    Pulse Readings from Last 3 Encounters:   03/06/19 63   03/06/19 73   02/06/19 67      Resp Readings from Last 3 Encounters:   02/06/19 16   11/16/18 17    SpO2 Readings from Last 3 Encounters:   03/06/19 99%   02/06/19 98%   11/16/18 97%      Temp Readings from Last 1 Encounters:   02/06/19 36.7  C (98.1  F) (Oral)    Ht Readings from Last 1 Encounters:   03/06/19 1.6 m (5' 3\")      Wt Readings from Last 1 Encounters:   03/06/19 84.2 kg (185 lb 9.6 oz)    Estimated body mass index is 32.88 kg/m  as calculated from the " "following:    Height as of 3/6/19: 1.6 m (5' 3\").    Weight as of 3/6/19: 84.2 kg (185 lb 9.6 oz).     LDA:  Left Groin Interventional Procedure Access (Active)   Number of days: 129       Right Groin Interventional Procedure Access (Active)   Number of days: 129            Assessment:   ASA SCORE: 2    NPO Status: > 6 hours since completed Solid Foods   Documentation: H&P complete; Preop Testing complete; Consents complete   Proceeding: Proceed without further delay  Tobacco Use:  NO Active use of Tobacco/UNKNOWN Tobacco use status     Plan:   Anes. Type:  General   Pre-Induction: Acetaminophen PO   Induction:  IV (Standard)   Airway: Oral ETT   Access/Monitoring: PIV; 2nd PIV; A-Line; US   Maintenance: Balanced; IV; Inhalational; Dexmedetomidine   Emergence: Procedure Site   Logistics: Observation/Admission     Postop Pain/Sedation Strategy:  Standard-Options: Opioids PRN     PONV Management:  Adult Risk Factors: Female, Non-Smoker, Postop Opioids  Prevention: Ondansetron; Dexamethasone     CONSENT: Direct conversation   Plan and risks discussed with: Patient   Blood Products: Consented (ALL Blood Products)                    PAC Discussion and Assessment    ASA Classification: 2  Case is suitable for:   Anesthetic techniques and relevant risks discussed: GA  Invasive monitoring and risk discussed: No  Types:   Possibility and Risk of blood transfusion discussed: Yes  NPO instructions given:   Additional anesthetic preparation and risks discussed:   Needs early admission to pre-op area:   Other:     PAC Resident/NP Anesthesia Assessment:  Francis Flores is a 54 year old female scheduled to undergo Stealth Assisted Endoscopic Transnasal Resection of Pituitary Tumor. She has osteoporosis, Cushing's disease and GERD.      She has the following specific operative considerations:   - RCRI : No serious cardiac risks.  0.4% risk of major adverse cardiac event.   - VTE risk: 0.26%  - NOEMI # of risks 1/8 = low risk  - " Risk of PONV score = 3.  If > 2, anti-emetic intervention recommended.  - GI: will take pantoprazole and ranitidine on DOS     Type and screen drawn today.    Patient discussed with Dr. Salas    Reviewed and Signed by PAC Mid-Level Provider/Resident  Mid-Level Provider/Resident: MIREYA Monet CNS  Date: 3/6/19  Time: 3:00pm    Attending Anesthesiologist Anesthesia Assessment:  Otherwise healthy female without significant underlying medical conditions.  No further evaluation necessary prior to surgery    Reviewed and Signed by PAC Anesthesiologist  Anesthesiologist: Maritza  Date: March 6, 2019  Time:   Pass/Fail: Pass  Disposition:     PAC Pharmacist Assessment:        Pharmacist:   Date:   Time:        Marsha Stark MD

## 2019-03-25 NOTE — OR NURSING
Paged Dr. Rosales to ask about assessment for room placement, and if pt can have Toradol. SBPs upper 90s. MAPs 70s.

## 2019-03-26 ENCOUNTER — OFFICE VISIT (OUTPATIENT)
Dept: INTERPRETER SERVICES | Facility: CLINIC | Age: 54
End: 2019-03-26
Payer: COMMERCIAL

## 2019-03-26 ENCOUNTER — APPOINTMENT (OUTPATIENT)
Dept: PHYSICAL THERAPY | Facility: CLINIC | Age: 54
End: 2019-03-26
Attending: STUDENT IN AN ORGANIZED HEALTH CARE EDUCATION/TRAINING PROGRAM
Payer: COMMERCIAL

## 2019-03-26 ENCOUNTER — APPOINTMENT (OUTPATIENT)
Dept: OCCUPATIONAL THERAPY | Facility: CLINIC | Age: 54
End: 2019-03-26
Attending: STUDENT IN AN ORGANIZED HEALTH CARE EDUCATION/TRAINING PROGRAM
Payer: COMMERCIAL

## 2019-03-26 LAB
ANION GAP SERPL CALCULATED.3IONS-SCNC: 6 MMOL/L (ref 3–14)
ANION GAP SERPL CALCULATED.3IONS-SCNC: 6 MMOL/L (ref 3–14)
BUN SERPL-MCNC: 4 MG/DL (ref 7–30)
BUN SERPL-MCNC: 5 MG/DL (ref 7–30)
CALCIUM SERPL-MCNC: 7.2 MG/DL (ref 8.5–10.1)
CALCIUM SERPL-MCNC: 7.4 MG/DL (ref 8.5–10.1)
CHLORIDE SERPL-SCNC: 116 MMOL/L (ref 94–109)
CHLORIDE SERPL-SCNC: 116 MMOL/L (ref 94–109)
CO2 SERPL-SCNC: 21 MMOL/L (ref 20–32)
CO2 SERPL-SCNC: 21 MMOL/L (ref 20–32)
CORTIS SERPL-MCNC: 23.6 UG/DL (ref 4–22)
CREAT SERPL-MCNC: 0.46 MG/DL (ref 0.52–1.04)
CREAT SERPL-MCNC: 0.49 MG/DL (ref 0.52–1.04)
ERYTHROCYTE [DISTWIDTH] IN BLOOD BY AUTOMATED COUNT: 15.1 % (ref 10–15)
ERYTHROCYTE [DISTWIDTH] IN BLOOD BY AUTOMATED COUNT: 15.2 % (ref 10–15)
GFR SERPL CREATININE-BSD FRML MDRD: >90 ML/MIN/{1.73_M2}
GFR SERPL CREATININE-BSD FRML MDRD: >90 ML/MIN/{1.73_M2}
GLUCOSE SERPL-MCNC: 112 MG/DL (ref 70–99)
GLUCOSE SERPL-MCNC: 126 MG/DL (ref 70–99)
HCT VFR BLD AUTO: 34.8 % (ref 35–47)
HCT VFR BLD AUTO: 35 % (ref 35–47)
HGB BLD-MCNC: 10.7 G/DL (ref 11.7–15.7)
HGB BLD-MCNC: 10.8 G/DL (ref 11.7–15.7)
INTERPRETATION ECG - MUSE: NORMAL
MAGNESIUM SERPL-MCNC: 2.3 MG/DL (ref 1.6–2.3)
MCH RBC QN AUTO: 28.5 PG (ref 26.5–33)
MCH RBC QN AUTO: 29 PG (ref 26.5–33)
MCHC RBC AUTO-ENTMCNC: 30.6 G/DL (ref 31.5–36.5)
MCHC RBC AUTO-ENTMCNC: 31 G/DL (ref 31.5–36.5)
MCV RBC AUTO: 93 FL (ref 78–100)
MCV RBC AUTO: 93 FL (ref 78–100)
PHOSPHATE SERPL-MCNC: 2.7 MG/DL (ref 2.5–4.5)
PLATELET # BLD AUTO: 253 10E9/L (ref 150–450)
PLATELET # BLD AUTO: 254 10E9/L (ref 150–450)
POTASSIUM SERPL-SCNC: 3.6 MMOL/L (ref 3.4–5.3)
POTASSIUM SERPL-SCNC: 3.8 MMOL/L (ref 3.4–5.3)
RBC # BLD AUTO: 3.73 10E12/L (ref 3.8–5.2)
RBC # BLD AUTO: 3.76 10E12/L (ref 3.8–5.2)
SODIUM SERPL-SCNC: 143 MMOL/L (ref 133–144)
SODIUM SERPL-SCNC: 144 MMOL/L (ref 133–144)
TROPONIN I SERPL-MCNC: <0.015 UG/L (ref 0–0.04)
WBC # BLD AUTO: 7.9 10E9/L (ref 4–11)
WBC # BLD AUTO: 8.4 10E9/L (ref 4–11)

## 2019-03-26 PROCEDURE — T1013 SIGN LANG/ORAL INTERPRETER: HCPCS | Mod: U3

## 2019-03-26 PROCEDURE — 97165 OT EVAL LOW COMPLEX 30 MIN: CPT | Mod: GO | Performed by: OCCUPATIONAL THERAPIST

## 2019-03-26 PROCEDURE — 85027 COMPLETE CBC AUTOMATED: CPT | Performed by: NEUROLOGICAL SURGERY

## 2019-03-26 PROCEDURE — 80048 BASIC METABOLIC PNL TOTAL CA: CPT | Performed by: NEUROLOGICAL SURGERY

## 2019-03-26 PROCEDURE — 97116 GAIT TRAINING THERAPY: CPT | Mod: GP

## 2019-03-26 PROCEDURE — 25000132 ZZH RX MED GY IP 250 OP 250 PS 637: Performed by: NURSE PRACTITIONER

## 2019-03-26 PROCEDURE — 84484 ASSAY OF TROPONIN QUANT: CPT | Performed by: NEUROLOGICAL SURGERY

## 2019-03-26 PROCEDURE — 25000132 ZZH RX MED GY IP 250 OP 250 PS 637: Performed by: STUDENT IN AN ORGANIZED HEALTH CARE EDUCATION/TRAINING PROGRAM

## 2019-03-26 PROCEDURE — 83735 ASSAY OF MAGNESIUM: CPT | Performed by: NEUROLOGICAL SURGERY

## 2019-03-26 PROCEDURE — 82533 TOTAL CORTISOL: CPT | Performed by: NEUROLOGICAL SURGERY

## 2019-03-26 PROCEDURE — 25800030 ZZH RX IP 258 OP 636: Performed by: STUDENT IN AN ORGANIZED HEALTH CARE EDUCATION/TRAINING PROGRAM

## 2019-03-26 PROCEDURE — 97530 THERAPEUTIC ACTIVITIES: CPT | Mod: GP

## 2019-03-26 PROCEDURE — 84100 ASSAY OF PHOSPHORUS: CPT | Performed by: NEUROLOGICAL SURGERY

## 2019-03-26 PROCEDURE — 20000004 ZZH R&B ICU UMMC

## 2019-03-26 PROCEDURE — 97161 PT EVAL LOW COMPLEX 20 MIN: CPT | Mod: GP

## 2019-03-26 PROCEDURE — 97535 SELF CARE MNGMENT TRAINING: CPT | Mod: GO | Performed by: OCCUPATIONAL THERAPIST

## 2019-03-26 PROCEDURE — 97530 THERAPEUTIC ACTIVITIES: CPT | Mod: GO | Performed by: OCCUPATIONAL THERAPIST

## 2019-03-26 PROCEDURE — 36415 COLL VENOUS BLD VENIPUNCTURE: CPT | Performed by: NEUROLOGICAL SURGERY

## 2019-03-26 RX ORDER — POTASSIUM CHLORIDE 1.5 G/1.58G
20-40 POWDER, FOR SOLUTION ORAL
Status: DISCONTINUED | OUTPATIENT
Start: 2019-03-26 | End: 2019-03-28 | Stop reason: HOSPADM

## 2019-03-26 RX ORDER — POLYETHYLENE GLYCOL 3350 17 G/17G
17 POWDER, FOR SOLUTION ORAL 2 TIMES DAILY
Status: DISCONTINUED | OUTPATIENT
Start: 2019-03-26 | End: 2019-03-28 | Stop reason: HOSPADM

## 2019-03-26 RX ORDER — ONDANSETRON 2 MG/ML
4-8 INJECTION INTRAMUSCULAR; INTRAVENOUS EVERY 6 HOURS PRN
Status: DISCONTINUED | OUTPATIENT
Start: 2019-03-26 | End: 2019-03-28 | Stop reason: HOSPADM

## 2019-03-26 RX ORDER — PROCHLORPERAZINE MALEATE 10 MG
10 TABLET ORAL EVERY 6 HOURS PRN
Status: DISCONTINUED | OUTPATIENT
Start: 2019-03-26 | End: 2019-03-28 | Stop reason: HOSPADM

## 2019-03-26 RX ORDER — NALOXONE HYDROCHLORIDE 0.4 MG/ML
.1-.4 INJECTION, SOLUTION INTRAMUSCULAR; INTRAVENOUS; SUBCUTANEOUS
Status: DISCONTINUED | OUTPATIENT
Start: 2019-03-26 | End: 2019-03-28 | Stop reason: HOSPADM

## 2019-03-26 RX ORDER — LABETALOL 20 MG/4 ML (5 MG/ML) INTRAVENOUS SYRINGE
10-40 EVERY 10 MIN PRN
Status: DISCONTINUED | OUTPATIENT
Start: 2019-03-26 | End: 2019-03-28 | Stop reason: HOSPADM

## 2019-03-26 RX ORDER — CALCIUM CARBONATE 500(1250)
1 TABLET ORAL 2 TIMES DAILY WITH MEALS
Status: DISCONTINUED | OUTPATIENT
Start: 2019-03-26 | End: 2019-03-28 | Stop reason: HOSPADM

## 2019-03-26 RX ORDER — KETOROLAC TROMETHAMINE 10 MG/1
10 TABLET, FILM COATED ORAL EVERY 4 HOURS PRN
Status: DISCONTINUED | OUTPATIENT
Start: 2019-03-26 | End: 2019-03-28 | Stop reason: HOSPADM

## 2019-03-26 RX ORDER — LANOLIN ALCOHOL/MO/W.PET/CERES
3 CREAM (GRAM) TOPICAL
Status: DISCONTINUED | OUTPATIENT
Start: 2019-03-26 | End: 2019-03-28 | Stop reason: HOSPADM

## 2019-03-26 RX ORDER — AMOXICILLIN 250 MG
3 CAPSULE ORAL 2 TIMES DAILY
Status: DISCONTINUED | OUTPATIENT
Start: 2019-03-26 | End: 2019-03-28 | Stop reason: HOSPADM

## 2019-03-26 RX ORDER — POTASSIUM CHLORIDE 7.45 MG/ML
10 INJECTION INTRAVENOUS
Status: DISCONTINUED | OUTPATIENT
Start: 2019-03-26 | End: 2019-03-28 | Stop reason: HOSPADM

## 2019-03-26 RX ORDER — POTASSIUM CHLORIDE 750 MG/1
20-40 TABLET, EXTENDED RELEASE ORAL
Status: DISCONTINUED | OUTPATIENT
Start: 2019-03-26 | End: 2019-03-28 | Stop reason: HOSPADM

## 2019-03-26 RX ORDER — MAGNESIUM SULFATE HEPTAHYDRATE 40 MG/ML
4 INJECTION, SOLUTION INTRAVENOUS EVERY 4 HOURS PRN
Status: DISCONTINUED | OUTPATIENT
Start: 2019-03-26 | End: 2019-03-28 | Stop reason: HOSPADM

## 2019-03-26 RX ORDER — ONDANSETRON 4 MG/1
4-8 TABLET, ORALLY DISINTEGRATING ORAL EVERY 6 HOURS PRN
Status: DISCONTINUED | OUTPATIENT
Start: 2019-03-26 | End: 2019-03-28 | Stop reason: HOSPADM

## 2019-03-26 RX ORDER — NALOXONE HYDROCHLORIDE 0.4 MG/ML
.1-.4 INJECTION, SOLUTION INTRAMUSCULAR; INTRAVENOUS; SUBCUTANEOUS
Status: ACTIVE | OUTPATIENT
Start: 2019-03-26 | End: 2019-03-27

## 2019-03-26 RX ORDER — POTASSIUM CL/LIDO/0.9 % NACL 10MEQ/0.1L
10 INTRAVENOUS SOLUTION, PIGGYBACK (ML) INTRAVENOUS
Status: DISCONTINUED | OUTPATIENT
Start: 2019-03-26 | End: 2019-03-28 | Stop reason: HOSPADM

## 2019-03-26 RX ORDER — POTASSIUM CHLORIDE 29.8 MG/ML
20 INJECTION INTRAVENOUS
Status: DISCONTINUED | OUTPATIENT
Start: 2019-03-26 | End: 2019-03-28 | Stop reason: HOSPADM

## 2019-03-26 RX ORDER — PANTOPRAZOLE SODIUM 40 MG/1
40 TABLET, DELAYED RELEASE ORAL
Status: DISCONTINUED | OUTPATIENT
Start: 2019-03-26 | End: 2019-03-28 | Stop reason: HOSPADM

## 2019-03-26 RX ORDER — LIDOCAINE 40 MG/G
CREAM TOPICAL
Status: DISCONTINUED | OUTPATIENT
Start: 2019-03-26 | End: 2019-03-28 | Stop reason: HOSPADM

## 2019-03-26 RX ORDER — HYDRALAZINE HYDROCHLORIDE 20 MG/ML
10-20 INJECTION INTRAMUSCULAR; INTRAVENOUS EVERY 30 MIN PRN
Status: DISCONTINUED | OUTPATIENT
Start: 2019-03-26 | End: 2019-03-28 | Stop reason: HOSPADM

## 2019-03-26 RX ADMIN — CALCIUM 500 MG: 500 TABLET ORAL at 17:46

## 2019-03-26 RX ADMIN — SODIUM CHLORIDE: 9 INJECTION, SOLUTION INTRAVENOUS at 03:53

## 2019-03-26 RX ADMIN — CALCIUM 500 MG: 500 TABLET ORAL at 09:49

## 2019-03-26 RX ADMIN — ACETAMINOPHEN 650 MG: 325 TABLET, FILM COATED ORAL at 14:02

## 2019-03-26 RX ADMIN — POLYETHYLENE GLYCOL 3350 17 G: 17 POWDER, FOR SOLUTION ORAL at 08:07

## 2019-03-26 RX ADMIN — CEPHALEXIN 500 MG: 250 CAPSULE ORAL at 12:47

## 2019-03-26 RX ADMIN — RANITIDINE 150 MG: 150 TABLET ORAL at 08:07

## 2019-03-26 RX ADMIN — KETOROLAC TROMETHAMINE 10 MG: 10 TABLET, FILM COATED ORAL at 09:52

## 2019-03-26 RX ADMIN — ACETAMINOPHEN 650 MG: 325 TABLET, FILM COATED ORAL at 00:52

## 2019-03-26 RX ADMIN — MELATONIN TAB 3 MG 3 MG: 3 TAB at 20:59

## 2019-03-26 RX ADMIN — ACETAMINOPHEN 650 MG: 325 TABLET, FILM COATED ORAL at 05:28

## 2019-03-26 RX ADMIN — KETOROLAC TROMETHAMINE 10 MG: 10 TABLET, FILM COATED ORAL at 14:02

## 2019-03-26 RX ADMIN — ACETAMINOPHEN 650 MG: 325 TABLET, FILM COATED ORAL at 17:56

## 2019-03-26 RX ADMIN — RANITIDINE 150 MG: 150 TABLET ORAL at 20:58

## 2019-03-26 RX ADMIN — SENNOSIDES AND DOCUSATE SODIUM 3 TABLET: 8.6; 5 TABLET ORAL at 08:06

## 2019-03-26 RX ADMIN — CEPHALEXIN 500 MG: 250 CAPSULE ORAL at 23:32

## 2019-03-26 RX ADMIN — ACETAMINOPHEN 650 MG: 325 TABLET, FILM COATED ORAL at 09:50

## 2019-03-26 RX ADMIN — VITAMIN D, TAB 1000IU (100/BT) 2000 UNITS: 25 TAB at 08:06

## 2019-03-26 RX ADMIN — PANTOPRAZOLE SODIUM 40 MG: 40 TABLET, DELAYED RELEASE ORAL at 08:07

## 2019-03-26 RX ADMIN — CEPHALEXIN 500 MG: 250 CAPSULE ORAL at 17:46

## 2019-03-26 ASSESSMENT — PAIN DESCRIPTION - DESCRIPTORS
DESCRIPTORS: HEADACHE
DESCRIPTORS: ACHING

## 2019-03-26 ASSESSMENT — VISUAL ACUITY
OU: NORMAL ACUITY

## 2019-03-26 ASSESSMENT — ACTIVITIES OF DAILY LIVING (ADL)
ADLS_ACUITY_SCORE: 17
ADLS_ACUITY_SCORE: 13
ADLS_ACUITY_SCORE: 17
ADLS_ACUITY_SCORE: 17
ADLS_ACUITY_SCORE: 21
ADLS_ACUITY_SCORE: 23

## 2019-03-26 NOTE — OR NURSING
Received hand-off from Addi Nugent RN at 1815 for a break. Gave Hand-off report back to Addi Nugent RN at 1900 when he returned.

## 2019-03-26 NOTE — OR NURSING
Pt stable. Vitals stable. Family at bedside interacting with Pt. Pt's neuro status stable. Pt c/o pain/discomfort over teeth and gums. Pt's son said they talked to the anesthesiologist about this and were told that it may be d/t the ETT. Pt also reporting new pain over right and left legs, right leg hurting more than left. This started when Pt asked to lift legs during final neuro check. Report and neuro checks done with ICU Float RN prior to her bringing Pt to SICU.

## 2019-03-26 NOTE — PROGRESS NOTES
"Otolaryngology Progress Note  March 26, 2019    S: No acute events overnight. Patient reporting odd feeling to upper teeth/gums, otherwise denies any concerns.    O: /64   Pulse 63   Temp 98.2  F (36.8  C) (Axillary)   Resp 14   Ht 1.6 m (5' 3\")   Wt 83.1 kg (183 lb 3.2 oz)   LMP  (LMP Unknown)   SpO2 99%   BMI 32.45 kg/m     General: Alert and oriented x 3, No acute distress   HEENT: EOMI. HB 1/6. Facial sensation intact and equal throughout. Merocels present in b/l nasal passages. No anterior nasal drainage or bleeding.    Pulmonary: Breathing non-labored, no stridor, no accessory muscle use.    Intake/Output Summary (Last 24 hours) at 3/26/2019 0642  Last data filed at 3/26/2019 0600  Gross per 24 hour   Intake 6425.25 ml   Output 2880 ml   Net 3545.25 ml       LABS:  ROUTINE IP LABS (Last four results)  BMP  Recent Labs   Lab 03/26/19 0312 03/25/19  1706 03/25/19  1217 03/25/19  0934    144 142 143   POTASSIUM 3.8 4.0 4.2 3.4   CHLORIDE 116* 118*  --   --    KI 7.2* 6.8*  --   --    CO2 21 19*  --   --    BUN 4* 6*  --   --    CR 0.46* 0.50*  --   --    * 123* 150* 155*     CBC  Recent Labs   Lab 03/26/19 0312 03/25/19  1217 03/25/19  0934   WBC 8.4  --   --    RBC 3.76*  --   --    HGB 10.7* 11.8 13.2   HCT 35.0  --   --    MCV 93  --   --    MCH 28.5  --   --    MCHC 30.6*  --   --    RDW 15.1*  --   --      --   --      INRNo lab results found in last 7 days.    A/P: Francis Flores is a 54 year old female with a past medical history of signs and symptoms of Cushing disease found to have a pituitary adenoma. She is now POD#1 s/p endoscopic stealth-guided transsphenoidal resection of pituitary tumor.     - Nasal precautions - no nose blowing, sneeze/cough with mouth open, no lifting or bending over, no strenuous activity.  - Ensure adequate bowel regimen to avoid straining  - Recommend adding keflex today for prophylaxis while nasal packing is in place  - ENT will remove " nasal packing prior to discharge  - Avoid nasal saline spray in the hospital to avoid confusion with CSF, recommend starting nasal saline spray on discharge    Dispo: SICU. Primary cares per NSG    -- Patient and above plan to be discussed with Dr. Jean-Paul Jordan PA-C  Otolaryngology-Head & Neck Surgery  Please contact ENT with questions by dialing [phone] and entering job code [phone] when prompted.

## 2019-03-26 NOTE — PLAN OF CARE
4A - PT evaluation completed and treatment initiated.   Discharge Planner PT   Patient plan for discharge: home with assist from family  Current status: Educated on nasal precautions. Pt completed sit <> supine with CGA-min A x 1-2, sit <> stand with min Ax1, progressed from hand hold min A x2 to min A x1 with ambulation, demonstrates minor unsteadiness and stiff posture but improving with increased gait, declining walking outside of room. Recommend up to chair and ambulation 3-4x/day with RN staff and hand hold assist with gait belt.  Barriers to return to prior living situation: stairs, balance, activity tolerance, pain  Recommendations for discharge: rehab  Rationale for recommendations: Currently recommend rehab due to impaired functional mobility and balance deficits, pending progress anticipate pt will be appropriate for home with assist of family and OP PT due to increased strength and balance deficits over the past few weeks.       Entered by: Bonita Menjivar 03/26/2019 3:20 PM

## 2019-03-26 NOTE — PROGRESS NOTES
03/26/19 0900   Quick Adds   Type of Visit Initial PT Evaluation       Present yes   Language [place]ian   Living Environment   Lives With child(britton), adult;child(britton), dependent;significant other  (at least a few other/adult children living with her, 9 kids)   Living Arrangements apartment   Home Accessibility stairs to enter home   Number of Stairs, Main Entrance other (see comments)  (15 steps)   Stair Railings, Main Entrance railings on both sides of stairs   Transportation Anticipated family or friend will provide   Living Environment Comment Pt has combined tub/shower, has one of her sons or daughter available to assist most of the time. Sleeps in a regular flat bed. Pt does not work outside the home but  does.    Self-Care   Usual Activity Tolerance moderate   Current Activity Tolerance fair   Regular Exercise No   Equipment Currently Used at Home none   Functional Level Prior   Ambulation 0-->independent   Transferring 0-->independent   Toileting 2-->assistive person   Bathing 2-->assistive person   Communication 0-->understands/communicates without difficulty   Swallowing 0-->swallows foods/liquids without difficulty   Cognition 0 - no cognition issues reported   Fall history within last six months yes   Number of times patient has fallen within last six months 2   Which of the above functional risks had a recent onset or change? ambulation;transferring;toileting;dressing;bathing;fall history   Prior Functional Level Comment Pt was previously IND-SBA; has been needing increased assist from family with ADLs(bathing, dressing, toileting) due to having trouble getting into bathroom. Increased unsteadiness over the past few months   General Information   Onset of Illness/Injury or Date of Surgery - Date 03/25/19   Referring Physician Onel Rosales MD   Patient/Family Goals Statement to get home with family helping   Pertinent History of Current Problem (include personal factors  and/or comorbidities that impact the POC) 54 year old female with history of Cushing s disease s/p endoscopic transnasal pituitary resection    Precautions/Limitations fall precautions  (nasal precautions)   Heart Disease Risk Factors Overweight;Lack of physical activity;Age   Cognitive Status Examination   Orientation person;place  (time not assessed)   Level of Consciousness alert   Follows Commands and Answers Questions 100% of the time   Personal Safety and Judgment at risk behaviors demonstrated   Integumentary/Edema   Integumentary/Edema Comments facial and hand edema   Posture    Posture Forward head position;Protracted shoulders   Range of Motion (ROM)   ROM Comment BUE and BLE appear grossly WFL   Strength   Strength Comments Demonstrates at least 3/5 in BUE and BLE with transfers   Bed Mobility   Bed Mobility Comments Supine > sit with min A   Transfer Skills   Transfer Comments Sit <> stand with min A x2   Gait   Gait Comments Amb x 10' with min A x2   Balance   Balance Comments Unsteady requiring min A x 1-2 with CGA with static standing and gait   General Therapy Interventions   Planned Therapy Interventions bed mobility training;balance training;gait training;ROM;strengthening;stretching;transfer training;risk factor education;home program guidelines;progressive activity/exercise   Clinical Impression   Criteria for Skilled Therapeutic Intervention yes, treatment indicated   PT Diagnosis impaired functional mobility   Influenced by the following impairments balance, pain, strength, activity tolerance, nasal precautions   Functional limitations due to impairments gait, stairs, transfers, bed mobility, functional endurance   Clinical Presentation Stable/Uncomplicated   Clinical Presentation Rationale POD #1 with tumor resection, no known complications, clinical reasoning   Clinical Decision Making (Complexity) Low complexity   Therapy Frequency` other (see comments)  (6x/wk)   Predicted Duration of  "Therapy Intervention (days/wks) 1 week   Anticipated Discharge Disposition Home with Outpatient Therapy;Home with Assist   Risk & Benefits of therapy have been explained Yes   Patient, Family & other staff in agreement with plan of care Yes   [facility] TM \"6 Clicks\"   2016, Trustees of [facility], under license to [employer].  All rights reserved.   6 Clicks Short Forms Basic Mobility Inpatient Short Form   [facility]  \"6 Clicks\" V.2 Basic Mobility Inpatient Short Form   1. Turning from your back to your side while in a flat bed without using bedrails? 3 - A Little   2. Moving from lying on your back to sitting on the side of a flat bed without using bedrails? 3 - A Little   3. Moving to and from a bed to a chair (including a wheelchair)? 3 - A Little   4. Standing up from a chair using your arms (e.g., wheelchair, or bedside chair)? 3 - A Little   5. To walk in hospital room? 3 - A Little   6. Climbing 3-5 steps with a railing? 3 - A Little   Basic Mobility Raw Score (Score out of 24.Lower scores equate to lower levels of function) 18   Total Evaluation Time   Total Evaluation Time (Minutes) 10     "

## 2019-03-26 NOTE — OP NOTE
Procedure Date: 03/25/2019      PREOPERATIVE DIAGNOSIS:  Cushing disease with pituitary adenoma.      POSTOPERATIVE DIAGNOSIS:  Cushing disease with pituitary adenoma.      PROCEDURE PERFORMED:  Endoscopic Stealth-guided transsphenoidal resection of pituitary tumor.      ATTENDING SURGEON:  Leidy Jeong MD and Derrell Crain MD      ASSISTANT SURGEON:  Mitch Myles MD and Abby Landa MD      INDICATIONS:  Ms. Flores is a 54-year-old female who presented with signs and symptoms of Cushing disease.  A workup revealed a pituitary adenoma on the left side of the sella.  The adenoma has been growing larger.  The adenoma creates ACTH and has been growing over time.  Therefore, she desired to have a surgical intervention.      INTRAOPERATIVE FINDINGS:   1.  Normal nasal and paranasal sinus anatomy.   2.  Left-sided rescue nasoseptal flap was raised.  A right-sided nasoseptal turnaround flap was also raised.   3.  Pituitary adenoma removed by Neurosurgery service, please see their portion of the dictation.   4.  The surgical defect was reconstructed with the repair underlay covered by Surgicel, then covered by Gelfoam.  Two of the 4 cm Merocel were placed to secure the repair.     5.  No CSF leak.      DESCRIPTION OF PROCEDURE:  After properly identifying the patient and obtaining signed informed consent, the patient was transferred to the operative room.  General anesthesia was induced and patient was intubated orotracheally by Anesthesia staff.  The patient was placed in a supine position and the table was turned 180 degrees to facilitate the procedure.  Neurosurgery service place patient's head in the Richardson with the neck in a neutral position.  The [employer] Stealth guidance system was then registered and tested for accuracy.  The patient's face was then prepped with Betadine and draped in a sterile fashion.  A timeout was performed to confirm the identity of the patient and the name of the procedure.      The  patient's nasal cavity was decongested with Afrin-soaked pledgets.  A total of 7 mL of 1% lidocaine with 1:100,000 epinephrine was injected to bilateral nasal septum, middle turbinate root, middle turbinate body, and sphenopalatine artery area.  The middle turbinates were lateralized at both sides with a El elevator.  The superior turbinate was exposed and cauterized with Aquamantys. The superior turbinate on both sides were then resected with a straight Faizan-Cut.  We then proceeded with raising left-sided nasoseptal rescue flap.  The left sphenoid sinus ostium was identified.  All incisions were then made just below the sphenoid ostium and brought medially to the septum and anteriorly to the level of the middle turbinate head.  The incision was then carried superiorly and anteriorly to the mucocutaneous junction.  The incision was then carried down from a superior to inferior direction right at the mucocutaneous junction.  A relaxing incision was then made at the left choana, anchored medially towards the septum and posterior edge of the vomer.  The left septal mucosa was then elevated off the bony and cartilaginous septum at the subperichondrial and subperiosteal plane.  The mucoperichondrial flap was then placed inferiorly in the nasal cavity for later use.  A posterior septectomy was then performed to remove the bony and a portion of the cartilaginous septums from the sphenoid sinus rostrum to the level of the middle turbinate.  The right-sided nasal mucoperichondrial flap was then developed at the nasal septum and turned around to the left side of the nasal cavity and brought anteriorly to form a nasoseptal turnaround flap.  At this point, we turned our attention to the sphenoid sinus.  The bilateral sphenoid ostium was identified.  The Kerrison rongeur was then used to enlarge the sphenoid sinus ostium bilaterally from a lateral to medial direction, as well as from superior to inferior direction.  The  sphenoid rostrum and anterior plate face of the sinus wall was then carefully removed with a combination of rongeur and drill.  The anterior sphenoid septum was then carefully removed to the posterior wall of the sphenoid sinus with a desiree drill.  The entire sphenoid sinus was then further exposed with removal of the posterior ethmoid sinus cells.  The posterior wall of the sphenoid sinus was then thinned with a desiree drill, then carefully removed with a Kerrison rongeur.  The pituitary gland as well as the adenoma was then revealed.  At this point, Neurosurgery service came in and performed their portion of the procedure to remove the pituitary adenoma.  After removal of the pituitary adenoma, hemostasis was achieved by Floseal as well as Aquamantys cautery.  There was no intraoperative CSF leak.      We then proceeded with repair of the surgical defect.  A piece of dural repair graft was trimmed to match the bony defect of the posterior a sphenoid sinus wall.  The dural repair graft was then placed in an underlay fashion to cover the bony defect.  The edge of the graft was secured with multiple pieces of Surgicel.  The sphenoid sinus was then packed with Gelfoam.  Two 4 cm  Merocel were then placed against the sphenoid sinus to secure the packing.  The nasal cavity was then irrigated with normal saline and suctioned.  An OG tube was placed to suction patient's stomach content out.  This concluded the procedure.  The patient was then turned back to Anesthesia staff and extubated without difficulties.      Dr. Figueroa was present during the entire procedure.      COMPLICATIONS:  None.      ESTIMATED BLOOD LOSS:  100 mL.      SPECIMENS:   1.  Pituitary mass.   2.  Clivus bone.   3.  Dura.        DISPOSITION:  The patient was transferred to PACU in stable condition.       I was present with the resident during the entire procedure and participated in the critical aspects.    KALI FIGUEROA MD       As dictated by  DARRYN LAMA MD            D: 2019   T: 2019   MT: ELVA      Name:     VINCE THOMPSON   MRN:      [id number]        Account:        [id number]   :      1965           Procedure Date: 2019      Document: [id number]

## 2019-03-26 NOTE — PLAN OF CARE
Neuro: Intact other than a baseline numbness and tingling on bilateral upper extremities and the RLE. Q 1 hour neuro check.     CV: Hemodynamically stable. SR 60s-70s. Maintaining SBP < 140. Afebrile.     Resp: Sounds clear. On RA sating 99%     GI: Positive bowel sounds. On a clear liquid diet and No Bm.     : Hernandez in place with adequate urine production.     Plan: Neuro checks, hemodynamic monitoring, pain management and notifying the MD with any

## 2019-03-26 NOTE — PLAN OF CARE
Admitted/transferred from: PACU  Reason for admission/transfer:  Cushing's disease.  Patient status upon admission/transfer: Stable.  Interventions: Labs sent.   Plan: Continuing patient care per MD orders.  2 RN skin assessment: completed by Marysol Vigil RN and Bj LUCAS RN.  Result of skin assessment and interventions/actions: Intact  Height, weight, drug calc weight: done  Patient belongings: Belongings with the family.  MDRO education (if applicable):

## 2019-03-26 NOTE — PROGRESS NOTES
Neurosurgery Daily Progress Note:    S: No Acute Events Overnight; Feeling Well This Morning    O:  Exam:  General: Awake;  Alert, In No Acute Distress  Pulm: Breathing Comfortably on Room Air  Mental status: Oriented x 3  Cranial Nerves: Cranial Nerves II-XII Intact Bilaterally  Strength:      Del Tr Bi WE WF Gr  R 5 5 5 5 5 5  L 5 5 5 5 5 5     HF KE KF DF PF   R 5 5 5 5 5   L 5 5 5 5 5     Pronator Drift: Absent  Sensory: Intact to Light Touch    Assessment:  54 year old female with history of Cushing s disease s/p endoscopic transnasal pituitary resection     Plan by problem:     Neuro:   Neuro Examination Q 4 HR  Monitor for CSF Leak  Tylenol PRN Pain    Cardiovascular: Goal SBP < 160 mmHg    Pulmonary: Nasal Precautions    Gastrointestinal: Advance Diet as Tolerated; Bowel Regimen     Renal: Monitor Urine Output Q 1 HR; RN to Notify Neurosurgery for Urine Output > 300 mL/hr for 3 consecutive hours    Endocrine: H/O Cushings Disease; Monitor for DI; Monitor Cortisol Level Q 12 HR    Infectious: Kephlex 500 mg Q 6 HR with Nasal Packing Intact    PT/OT: Pending    DVT prophylaxis: Sequential compression devices     Ulcer prophylaxis: Home  protonix and zantac ordered      DISPO:  6A; Anticipate D/C Home 3/27    Barriers: Evaluation by PT and OT    Hanna Mckeon, MSN, ACNP-BC  Department of Neurosurgery  Pager: [phone]

## 2019-03-26 NOTE — CONSULTS
Endocrinology Fellow note    Chief complaint:  Francis is a 54 year old female seen in consultation at the request of Dr. Rosales for Cushing's disease s/p transphenoidal resection of pituitary tumor on 3/25/2019.      HISTORY OF PRESENT ILLNESS  Mrs. Flores  had a pituitary macroadenoma diagnosed in 2008. She has been seen and following by endocrine in clinic ().     In summary of pituitary tumor:  Patient has h/o headaches and had MRI done in 2008 and found sellar mass, no evidence of hormonal excess or deficiency at that time. On f/u in 2009 and 2010, no hormonal changes, eye exam in 2010 showed no visual field defects. There was a concern for Cushing's disease in 12/2013 due to positive dexamethasone suppression test. Subsequent tests showed increased ACTH and increased free urinary cortisol.  A dexamethasone tests was ordered however it is unclear whether or not the patient did the test. She loss f/u and came back to clinic in 2015 and found to have stable tumor size. Patient was seen at [facility] in 2017, labs from 11/14/2017 (9.15AM) showed elevated ACTH 160 (7.2-63), cortisol 11, 24 hour urine collection: Urinary volume 1390 ml; Calcium 167 mg per 24 hours, creatinine 917 mg per 24 hours, cortisol 43  g per 24 hours, TSH 1.7, free T4 0.9, prolactin 7.3, FSH 44.8, IGF-I 90. Brain MRI on 11/7/2017 showed almost completely resolved pituitary adenoma.There remains some hypoenhancing material in the medial aspect of the left cavernous sinus of uncertain significance; the initial studies showed a subtotal encasement of the left cavernous ICA by tumor and that this material has almost completely resolved except for the his small residual hypoenhancing region. No evidence of new tumor.     IPSS was done on 11/16/2018 showed strongly suggesting Cushing's disease. So, she was planned to have pituitary surgery for that. She also has h/o osteoporosis and vitamin D deficiency.     Please see further extensive  HPI from Dr. James note 1/8/2019:    Cushing'd disease was diagnosed. She was admitted for transphenoidal resection surgery on 3/25/2019. The surgery was complicated by hypotensive in PACU. She was given IVF as well as hydrocortisone 50 mg IV x1 at 1700 on 3/25 due to hypotension. Her hemodynamics have been stable throughout the night. This morning, she complaint about headache and nasal congestion. She denies blurry or double vision. No n/v. Denies excessive thirst. Denies chest pain, palpitations, abdominal pain, muscles weakness, lightheadedness.       REVIEW OF SYSTEMS    10 system ROS otherwise as per the HPI or negative    Past Medical History  Past Medical History:   Diagnosis Date     GERD (gastroesophageal reflux disease)      Osteoporosis      Pituitary adenoma (H) 4/13/2012       Medications  Current Facility-Administered Medications   Medication     acetaminophen (TYLENOL) tablet 650 mg     calcium carbonate 500 mg (elemental) (OSCAL;OYSTER SHELL CALCIUM) tablet 500 mg     hydrALAZINE (APRESOLINE) injection 10-20 mg     ketorolac (TORADOL) tablet 10 mg     labetalol (NORMODYNE/TRANDATE) syringe 10-40 mg     lidocaine (LMX4) cream     lidocaine 1 % 0.1-1 mL     magnesium sulfate 4 g in 100 mL sterile water (premade)     naloxone (NARCAN) injection 0.1-0.4 mg     naloxone (NARCAN) injection 0.1-0.4 mg     ondansetron (ZOFRAN-ODT) ODT tab 4-8 mg    Or     ondansetron (ZOFRAN) injection 4-8 mg     pantoprazole (PROTONIX) EC tablet 40 mg     polyethylene glycol (MIRALAX/GLYCOLAX) Packet 17 g     potassium chloride (KLOR-CON) Packet 20-40 mEq     potassium chloride 10 mEq in 100 mL intermittent infusion with 10 mg lidocaine     potassium chloride 10 mEq in 100 mL sterile water intermittent infusion (premix)     potassium chloride 20 mEq in 50 mL intermittent infusion     potassium chloride ER (K-DUR/KLOR-CON M) CR tablet 20-40 mEq     potassium phosphate 15 mmol in D5W 250 mL intermittent infusion      "potassium phosphate 20 mmol in D5W 250 mL intermittent infusion     potassium phosphate 20 mmol in D5W 500 mL intermittent infusion     potassium phosphate 25 mmol in D5W 500 mL intermittent infusion     prochlorperazine (COMPAZINE) injection 10 mg    Or     prochlorperazine (COMPAZINE) tablet 10 mg     ranitidine (ZANTAC) tablet 150 mg     senna-docusate (SENOKOT-S/PERICOLACE) 8.6-50 MG per tablet 3 tablet     sodium chloride (PF) 0.9% PF flush 3 mL     sodium chloride (PF) 0.9% PF flush 3 mL     vitamin D3 (CHOLECALCIFEROL) 1000 units (25 mcg) tablet 2,000 Units       No current outpatient medications on file.       Allergies  No Known Allergies    Family History  No Family Hx of pituitary disease, asthma or osteoporosis.   Has 4 healthy children, 25 yrs old girl, 20 yrs old twin boys and 17 yrs old boy.    Social History  Behavioral history: No tobacco use.   Home environment: No secondhand tobacco smoke in home.   Mrs. Flores does not work.   She lives with her  and their 4 children. Mrs. Flores lives in [place].  She does not smoke or consume alcohol.     Physical Exam  /61 (BP Location: Right arm)   Pulse 65   Temp 98.9  F (37.2  C) (Axillary)   Resp 15   Ht 1.6 m (5' 3\")   Wt 83.1 kg (183 lb 3.2 oz)   LMP  (LMP Unknown)   SpO2 99%   BMI 32.45 kg/m    Body mass index is 32.45 kg/m .  GENERAL :  Lying on bed, breathing on room air, in mild distress due to headache and nasal congestion  SKIN: Normal color, normal temperature, texture.  No hirsutism, alopecia or purple striae.     EYES: PERRL, EOMI, No scleral icterus,  No proptosis, conjunctival redness. Nasal packing in placed.   MOUTH: Moist, pink; pharynx clear  NECK: No visible masses. No palpable adenopathy, or masses. No carotid bruits.   THYROID:  Normal, nontender, smooth / firm texture,  no nodules  RESP: Lungs clear to auscultation bilaterally  CARDIAC: Regular rate and rhythm, normal S1 S2, without murmurs  ABDOMEN: Normal bowel " sounds; soft, nontender, no HSM or masses       NEURO: awake, alert, responds appropriately to questions.  Cranial nerves intact.  Moves all extremities. No tremor of the outstretched hand.   EXTREMITIES: No clubbing, cyanosis or edema.    DATA REVIEW    ENDO PITUITARY LABS[facility] Latest Ref Rng & Units 3/26/2019 3/26/2019   CORTISOL, SERUM 4 - 22 ug/dL 23.6 (H)      ENDO PITUITARY LABS-[facility] Latest Ref Rng & Units 3/25/2019 3/25/2019   CORTISOL, SERUM 4 - 22 ug/dL 23.1 (H)      3/26/2019  Na 143    MRI brain w contrast 3/25/2019  Impression: Limited imaging primarily for the purposes of stereotactic  localization.  -  No significant change in the residual 1.8 cm pituitary macroadenoma  extending into the left cavernous sinus and surrounding portions of  the  left internal carotid artery.        Intake/Output Summary (Last 24 hours) at 3/26/2019 1022  Last data filed at 3/26/2019 0800  Gross per 24 hour   Intake 5425.25 ml   Output 2180 ml   Net 3245.25 ml       ASSESSMENT/PLAN:   Mrs. Flores  had a pituitary macroadenoma diagnosed in 2008, found to have Cushing's disease s/p transphenoidal resection of pituitary tumor on 3/25/2019.    #Cushing's disease  #Pituitary macroadenoma   S/p transphenoidal resection 3/25/2019. The surgery was complicated by hypotensive in PACU. She was given hydrocortisone at 1700 on 3/25. Her cortisol at 1700 on 3/25 was 23.1, unclear if this was before or after hydrocortisone given, could be after since it was higher than her baseline cortisol prior to surgery, however, could still be from the residual tumor and the stress response following surgery. Her AM cortisol on 3/26 remains elevated 23.6. Will continue monitoring cortisol level and other hormonal deficiency.     #pituitary-adrenal axis  - Monitor cortisol level q12h to evaluate for curative.   - Plan start hydrocortisone if cortisol <2, 20 mg in AM and 10 mg in PM    #pituitary-thyroid axis  - plan check free T4 in 1  week    #pituitary-gonadal axis  Menopause 14 years ago. No need for hormonal evaluation or replacement.     #central DI  I/O positive 3.5L. Na stable. No signs or symptoms of DI currently.   - Monitor urine output and Na level.  - If urine output >300 ml x3 hr consecutively, check Na level and urine sp.gr  - if urine spr gr <1.005 and Na > 145, consider DDAVP subcutaneous    Patient was seen and discussed with .    Enoc Mueller MD  Endocrine fellow. PGY-4  [phone]    --- Addendum----  I saw the patient with endocrine fellow Dr. Mueller and directly examined patient and discussed. Agree above note and plan.     Cushing disease, post op. Overnight hypootensive episode which required hydrocortisone 50 mg IV. Am cortisol 23.   Will follow Q12, then evaluate.     Tete Weir MD  Staff Physician  Endocrinology and Metabolism  [facility]  License: [id number]  Pager: [phone]

## 2019-03-26 NOTE — PLAN OF CARE
Neuro: Intact. Numbness/tingling BUE/RLE baseline. AxOx4  sx/family interprets. PERRL. Up to bathroom with A of 1.    Cardiac: NSR 70s. Normotensive SBP 110s-120s. Afebrile. A-line dc'd.     Resp: 100% on RA. Clear lung sounds.    GI: Regular diet, needs encouragement. Good fluid intake.    : Hernandez out at 1200. Has voided x2 in bathroom.    Dispo: 6A pending bed availability.

## 2019-03-27 ENCOUNTER — APPOINTMENT (OUTPATIENT)
Dept: SPEECH THERAPY | Facility: CLINIC | Age: 54
End: 2019-03-27
Attending: NURSE PRACTITIONER
Payer: COMMERCIAL

## 2019-03-27 ENCOUNTER — APPOINTMENT (OUTPATIENT)
Dept: OCCUPATIONAL THERAPY | Facility: CLINIC | Age: 54
End: 2019-03-27
Attending: NEUROLOGICAL SURGERY
Payer: COMMERCIAL

## 2019-03-27 ENCOUNTER — APPOINTMENT (OUTPATIENT)
Dept: PHYSICAL THERAPY | Facility: CLINIC | Age: 54
End: 2019-03-27
Attending: NEUROLOGICAL SURGERY
Payer: COMMERCIAL

## 2019-03-27 LAB
ANION GAP SERPL CALCULATED.3IONS-SCNC: 10 MMOL/L (ref 3–14)
BUN SERPL-MCNC: 6 MG/DL (ref 7–30)
CALCIUM SERPL-MCNC: 8.2 MG/DL (ref 8.5–10.1)
CHLORIDE SERPL-SCNC: 115 MMOL/L (ref 94–109)
CO2 SERPL-SCNC: 20 MMOL/L (ref 20–32)
CORTIS SERPL-MCNC: 12.8 UG/DL (ref 4–22)
CORTIS SERPL-MCNC: 15.6 UG/DL (ref 4–22)
CORTIS SERPL-MCNC: 5.9 UG/DL (ref 4–22)
CORTIS SERPL-MCNC: 6.1 UG/DL (ref 4–22)
CREAT SERPL-MCNC: 0.54 MG/DL (ref 0.52–1.04)
ERYTHROCYTE [DISTWIDTH] IN BLOOD BY AUTOMATED COUNT: 15.4 % (ref 10–15)
GFR SERPL CREATININE-BSD FRML MDRD: >90 ML/MIN/{1.73_M2}
GLUCOSE SERPL-MCNC: 149 MG/DL (ref 70–99)
HCT VFR BLD AUTO: 35.9 % (ref 35–47)
HGB BLD-MCNC: 10.7 G/DL (ref 11.7–15.7)
MAGNESIUM SERPL-MCNC: 2.3 MG/DL (ref 1.6–2.3)
MCH RBC QN AUTO: 28.5 PG (ref 26.5–33)
MCHC RBC AUTO-ENTMCNC: 29.8 G/DL (ref 31.5–36.5)
MCV RBC AUTO: 96 FL (ref 78–100)
PHOSPHATE SERPL-MCNC: 1.9 MG/DL (ref 2.5–4.5)
PLATELET # BLD AUTO: 238 10E9/L (ref 150–450)
POTASSIUM SERPL-SCNC: 3.2 MMOL/L (ref 3.4–5.3)
POTASSIUM SERPL-SCNC: 3.9 MMOL/L (ref 3.4–5.3)
RBC # BLD AUTO: 3.76 10E12/L (ref 3.8–5.2)
SODIUM SERPL-SCNC: 144 MMOL/L (ref 133–144)
SODIUM SERPL-SCNC: 145 MMOL/L (ref 133–144)
WBC # BLD AUTO: 7.1 10E9/L (ref 4–11)

## 2019-03-27 PROCEDURE — 82533 TOTAL CORTISOL: CPT | Performed by: NEUROLOGICAL SURGERY

## 2019-03-27 PROCEDURE — 83735 ASSAY OF MAGNESIUM: CPT | Performed by: NEUROLOGICAL SURGERY

## 2019-03-27 PROCEDURE — 12000001 ZZH R&B MED SURG/OB UMMC

## 2019-03-27 PROCEDURE — 84295 ASSAY OF SERUM SODIUM: CPT | Performed by: NEUROLOGICAL SURGERY

## 2019-03-27 PROCEDURE — 36415 COLL VENOUS BLD VENIPUNCTURE: CPT | Performed by: STUDENT IN AN ORGANIZED HEALTH CARE EDUCATION/TRAINING PROGRAM

## 2019-03-27 PROCEDURE — 84132 ASSAY OF SERUM POTASSIUM: CPT | Performed by: NEUROLOGICAL SURGERY

## 2019-03-27 PROCEDURE — 92610 EVALUATE SWALLOWING FUNCTION: CPT | Mod: GN

## 2019-03-27 PROCEDURE — 25000132 ZZH RX MED GY IP 250 OP 250 PS 637: Performed by: STUDENT IN AN ORGANIZED HEALTH CARE EDUCATION/TRAINING PROGRAM

## 2019-03-27 PROCEDURE — 92526 ORAL FUNCTION THERAPY: CPT | Mod: GN

## 2019-03-27 PROCEDURE — 97535 SELF CARE MNGMENT TRAINING: CPT | Mod: GO | Performed by: OCCUPATIONAL THERAPIST

## 2019-03-27 PROCEDURE — 25000132 ZZH RX MED GY IP 250 OP 250 PS 637: Performed by: NURSE PRACTITIONER

## 2019-03-27 PROCEDURE — 97116 GAIT TRAINING THERAPY: CPT | Mod: GP | Performed by: PHYSICAL THERAPIST

## 2019-03-27 PROCEDURE — 82533 TOTAL CORTISOL: CPT | Performed by: STUDENT IN AN ORGANIZED HEALTH CARE EDUCATION/TRAINING PROGRAM

## 2019-03-27 PROCEDURE — 84100 ASSAY OF PHOSPHORUS: CPT | Performed by: NEUROLOGICAL SURGERY

## 2019-03-27 PROCEDURE — 85027 COMPLETE CBC AUTOMATED: CPT | Performed by: NEUROLOGICAL SURGERY

## 2019-03-27 PROCEDURE — 36415 COLL VENOUS BLD VENIPUNCTURE: CPT | Performed by: NEUROLOGICAL SURGERY

## 2019-03-27 PROCEDURE — 97530 THERAPEUTIC ACTIVITIES: CPT | Mod: GP | Performed by: PHYSICAL THERAPIST

## 2019-03-27 PROCEDURE — 80048 BASIC METABOLIC PNL TOTAL CA: CPT | Performed by: NEUROLOGICAL SURGERY

## 2019-03-27 RX ADMIN — PANTOPRAZOLE SODIUM 40 MG: 40 TABLET, DELAYED RELEASE ORAL at 08:02

## 2019-03-27 RX ADMIN — ACETAMINOPHEN 650 MG: 325 TABLET, FILM COATED ORAL at 08:07

## 2019-03-27 RX ADMIN — POTASSIUM CHLORIDE 40 MEQ: 750 TABLET, EXTENDED RELEASE ORAL at 08:02

## 2019-03-27 RX ADMIN — VITAMIN D, TAB 1000IU (100/BT) 2000 UNITS: 25 TAB at 08:02

## 2019-03-27 RX ADMIN — RANITIDINE 150 MG: 150 TABLET ORAL at 08:02

## 2019-03-27 RX ADMIN — CEPHALEXIN 500 MG: 250 CAPSULE ORAL at 05:09

## 2019-03-27 RX ADMIN — POLYETHYLENE GLYCOL 3350 17 G: 17 POWDER, FOR SOLUTION ORAL at 08:02

## 2019-03-27 RX ADMIN — POTASSIUM CHLORIDE 20 MEQ: 750 TABLET, EXTENDED RELEASE ORAL at 12:30

## 2019-03-27 RX ADMIN — CALCIUM 500 MG: 500 TABLET ORAL at 17:13

## 2019-03-27 RX ADMIN — RANITIDINE 150 MG: 150 TABLET ORAL at 20:37

## 2019-03-27 RX ADMIN — SENNOSIDES AND DOCUSATE SODIUM 3 TABLET: 8.6; 5 TABLET ORAL at 08:02

## 2019-03-27 RX ADMIN — POLYETHYLENE GLYCOL 3350 17 G: 17 POWDER, FOR SOLUTION ORAL at 20:37

## 2019-03-27 RX ADMIN — CALCIUM 500 MG: 500 TABLET ORAL at 08:02

## 2019-03-27 RX ADMIN — ACETAMINOPHEN 650 MG: 325 TABLET, FILM COATED ORAL at 17:22

## 2019-03-27 RX ADMIN — CEPHALEXIN 500 MG: 250 CAPSULE ORAL at 12:30

## 2019-03-27 RX ADMIN — SENNOSIDES AND DOCUSATE SODIUM 3 TABLET: 8.6; 5 TABLET ORAL at 20:37

## 2019-03-27 RX ADMIN — CEPHALEXIN 500 MG: 250 CAPSULE ORAL at 17:13

## 2019-03-27 ASSESSMENT — ACTIVITIES OF DAILY LIVING (ADL)
ADLS_ACUITY_SCORE: 20

## 2019-03-27 ASSESSMENT — PAIN DESCRIPTION - DESCRIPTORS
DESCRIPTORS: HEADACHE

## 2019-03-27 ASSESSMENT — VISUAL ACUITY: OU: NORMAL ACUITY

## 2019-03-27 NOTE — PLAN OF CARE
PT 4A: Discharge Planner PT   Patient plan for discharge: home with assist  Current status: pt ambulates 500ft with CGA progressing from hand hold assist to no AD, mild instability but no LOB. Pt with some fatigue but no SOB, vitals stable. Session ended early as pt transferring units. Pt able to recall nasal precautions.   Barriers to return to prior living situation: stairs at home, assist for mobility.   Recommendations for discharge: anticipate return home with assist  Rationale for recommendations: pt with improved mobility from last session, pending trial of stairs anticipate return home with assist when medically appropriate.        Entered by: Tiara Mathias 03/27/2019 1:46 PM

## 2019-03-27 NOTE — PROGRESS NOTES
"Endocrine Consult Follow Up   Patient: Francis Thompson   MRN: [id number]  Date of Service: 3/27/2019    Subjective:  No acute event overnight. Feeling well. Improving headache. No excessive thirst or urination.     Medications:  Calcium carbonate 500 mg bid  Cholecalciferol 2000 international unit(s) daily    Physical Examination:  Blood pressure 113/80, pulse 75, temperature 97.8  F (36.6  C), temperature source Oral, resp. rate 16, height 1.6 m (5' 3\"), weight 83.1 kg (183 lb 3.2 oz), SpO2 94 %.  GEN: Awake, alert, no distress.  HEENT: EOMI.Nasal packing in placed  NECK: Thyroid normal. No cervical or clavicular LAD.   CV: RRR with no murmur.   RESP: CTA bilaterally.   ABD: Soft, non-tender, and non-distended, with normal BS.   EXT: No peripheral edema.   SKIN: Normal skin temperature and turgor with no lesions.   NEURO: Normoactive reflexes. No tremor.     Labs:   Results for FRANCIS THOMPSON (MRN [id number]) as of 3/27/2019 08:56   Ref. Range 3/26/2019 03:12 3/26/2019 06:59 3/26/2019 23:40 3/27/2019 04:24 3/27/2019 07:00   Sodium Latest Ref Range: 133 - 144 mmol/L 144 143  145 (H)        Results for FRANCIS THOMPSON (MRN [id number]) as of 3/27/2019 08:56   Ref. Range 3/26/2019 03:12 3/26/2019 06:59 3/26/2019 23:40 3/27/2019 04:24 3/27/2019 07:00   Cortisol Serum Latest Ref Range: 4 - 22 ug/dL  23.6 (H) 6.1 15.6 12.8           Intake/Output Summary (Last 24 hours) at 3/27/2019 1335  Last data filed at 3/27/2019 1224  Gross per 24 hour   Intake 900 ml   Output 1950 ml   Net -1050 ml       Assessment/Recommendations:   Mrs. Thompson  had a pituitary macroadenoma diagnosed in 2008, found to have Cushing's disease s/p transphenoidal resection of pituitary tumor on 3/25/2019.     #Cushing's disease  #Pituitary macroadenoma   S/p transphenoidal resection 3/25/2019. The surgery was complicated by hypotensive in PACU. She was given hydrocortisone at 1700 on 3/25. Her cortisol at 1700 on 3/25 was 23.1, unclear if this " was before or after hydrocortisone given, could be after since it was higher than her baseline cortisol prior to surgery, however, could still be from the residual tumor and the stress response following surgery. Her AM cortisol on 3/26 remains elevated 23.6 and 3/27 at 7AM wasa 12.8. Cortisol level has not come down as expected for curative. This suggests that the patient may still have residual tumor.      #pituitary-adrenal axis  - no hydrocortisone needed     #pituitary-thyroid axis  - plan check free T4 in 1 week     #pituitary-gonadal axis  Menopause 14 years ago. No need for hormonal evaluation or replacement.      #central DI  Increased urine output, I/O net negative 1050 ml in 24h. Na is elevating to 145 this morning. Will monitor UOP. Could still be diuresis following fluid resuscitation.  - Monitor urine output and Na level.  - If urine output >300 ml x3 hr consecutively, check Na level and urine sp.gr  - if urine spr gr <1.005 and Na > 145, consider DDAVP subcutaneous       Discharge recommendations:  - monitor for DI per discharge protocol  - Na checks on 4/1/2019  - Free T4 checks on 4/1/2019  - Endocrine clinic follow up in 2 weeks with .       Patient was seen and discussed with .      Enoc Mueller MD  Endocrine fellow, PGY-4  [phone]    --- Addendum----  I saw the patient with endocrine fellow Dr. Mueller and directly examined patient and discussed. Agree above note and plan.    out patient follow up as routine.       Tete Weir MD  Staff Physician  Endocrinology and Metabolism  [facility]  License: [place] [id number]  Pager: [phone]

## 2019-03-27 NOTE — PLAN OF CARE
Discharge Planner SLP   Patient plan for discharge: Unknown  Current status: Clinical swallow eval completed per MD order. Pt presents with functional oropharyngeal swallow mechanism. Oral mech exam unremarkable. Assessed with thin liquids, puree, and higher texture solids. Oral phase WFL. Pharyngeal phase WFL, no overt s/sx of aspiration. Recommend regular diet/thin liquids. Ensure pt is fully alert and upright for all PO, taking small bites/sips at slow rate. No straws per ENT. No additional SLP services indicated.  Barriers to return to prior living situation: None from ST perspective  Recommendations for discharge: Defer to MD  Rationale for recommendations: Functional oropharyngeal swallow mechanism       Entered by: Silvia Toney 03/27/2019 3:45 PM

## 2019-03-27 NOTE — PLAN OF CARE
OT/6A:  Discharge Planner OT   Patient plan for discharge: home  Current status: Pt SBA for ambulation and toileting. Educated pt and family to progress pt ind in encouraging activity and letting pt complete ADLs  Barriers to return to prior living situation: medical status   Recommendations for discharge: home with assist   Rationale for recommendations: pt may require increased assist due to post op precautions and decreased activity tolerance.        Entered by: Allison Pastor 03/27/2019 2:43 PM

## 2019-03-27 NOTE — PLAN OF CARE
Pt AVSS, complained of pain but didn't request anything for it.  Pt is A+O x4, follows all commands.  Pt has been SR without ectopy. Lung sounds clear. Pt has nasal packing in place without drainage.  Pt will possibly be discharged today.  Will continue with plan of care.     Pain Acute  Optimal Pain Control  3/27/2019 0519 - No Change by Winifred Ruiz RN

## 2019-03-27 NOTE — PLAN OF CARE
Discharge Planner OT   Patient plan for discharge: Not stated  Current status: Pt required max encouragement for ind throughout. OT educated pt on precautions post surgery. Pt SBA LB dressing doffing/donning socks, CGA STS transfer EOB> Chair pt refused further activity. VSS throughout.   Barriers to return to prior living situation: medical status  Recommendations for discharge: TCU  Rationale for recommendations: to increase ind in ADLS/IADLS       Entered by: Lesa Chang 03/26/2019 7:58 PM

## 2019-03-27 NOTE — PROGRESS NOTES
Neurosurgery Progress Note    S: No Acute Events Overnight; Feeling Well This Morning    O:  Exam:  General: Awake;  Alert, In No Acute Distress  Pulm: Breathing Comfortably on Room Air  Mental status: Oriented x 3  Cranial Nerves: Cranial Nerves II-XII Intact Bilaterally  Strength:      Del Tr Bi WE WF Gr  R 5 5 5 5 5 5  L 5 5 5 5 5 5     HF KE KF DF PF   R 5 5 5 5 5   L 5 5 5 5 5     Pronator Drift: Absent  Sensory: Intact to Light Touch    Assessment:  54 year old female with history of Cushing s disease s/p endoscopic transnasal pituitary resection     Plan by problem:     Neuro:   Neuro Examination Q 4 HR  Monitor for CSF Leak  Tylenol PRN Pain    Cardiovascular: Goal SBP < 160 mmHg    Pulmonary: Nasal Precautions    Gastrointestinal: Advance Diet as Tolerated; Bowel Regimen     Renal: Monitor Urine Output Q 1 HR; RN to Notify Neurosurgery for Urine Output > 300 mL/hr for 3 consecutive hours    Endocrine: H/O Cushings Disease; Monitor for DI; Monitor Cortisol Level Q 12 HR    Infectious: Kephlex 500 mg Q 6 HR with Nasal Packing Intact    PT/OT: PT: Rehabilitation; OT Pending    DVT prophylaxis: Sequential compression devices     Ulcer prophylaxis: Home  protonix and zantac ordered      DISPO:  6A; Anticipate Readiness of Discharge 3/28/2019    Barriers: Evaluation by PT and OT    -----------------------------------  Heidy Francis MD, MS  Neurosurgery PGY-2      I have reviewed the history. I have seen and examined the patient myself and agree with the assessment and plan above. Awaiting cortisol level this morning. Continue to hold steroids, mobilize.  RODRIGUEZ Crain MD

## 2019-03-27 NOTE — PROGRESS NOTES
03/27/19 1538   General Information   Onset Date 03/25/19   Start of Care Date 03/27/19   Referring Physician Winifred Brown APRN CNP   Patient Profile Review/OT: Additional Occupational Profile Info See Profile for full history and prior level of function   Patient/Family Goals Statement None stated   Swallowing Evaluation Bedside swallow evaluation   Behaviorial Observations WFL (within functional limits)   Mode of current nutrition Oral diet   Type of oral diet Thin liquid;Regular   Respiratory Status Room air   Comments SLP: Pt is a 54 year old female with a past medical history of signs and symptoms of Cushing disease found to have a pituitary adenoma. She is now POD#2 s/p endoscopic stealth-guided transsphenoidal resection of pituitary tumor. Pt had initially reported dysphagia, however, now denies swallowing difficulty. Clinical swallow eval completed per MD order.   Clinical Swallow Evaluation   Oral Musculature generally intact   Dentition present and adequate   Mucosal Quality good   Mandibular Strength and Mobility intact   Oral Labial Strength and Mobility WFL   Laryngeal Function Cough;Throat clear;Swallow;Voicing initiated   Oral Musculature Comments Functional   Clinical Swallow Eval: Thin Liquid Texture Trial   Mode of Presentation, Thin Liquids cup;self-fed  (No straws per ENT)   Volume of Liquid or Food Presented 3oz water   Oral Phase of Swallow WFL   Pharyngeal Phase of Swallow intact   Diagnostic Statement WFL, no overt s/sx of aspiration   Clinical Swallow Eval: Puree Solid Texture Trial   Mode of Presentation, Puree spoon;self-fed   Volume of Puree Presented 2oz pudding   Oral Phase, Puree WFL   Pharyngeal Phase, Puree intact   Diagnostic Statement WFL, no overt s/sx of aspiration   Clinical Swallow Eval: Solid Food Texture Trial   Mode of Presentation, Solid self-fed   Volume of Solid Food Presented 1 cracker   Oral Phase, Solid WFL   Pharyngeal Phase, Solid intact    Diagnostic Statement WFL, no overt s/sx of aspiration   Esophageal Phase of Swallow   Patient reports or presents with symptoms of esophageal dysphagia No   Swallow Eval: Clinical Impressions   Skilled Criteria for Therapy Intervention No problems identified which require skilled intervention   Functional Assessment Scale (FAS) 7   Treatment Diagnosis Functional oropharyngeal swallow mechanism   Diet texture recommendations Regular diet;Thin liquids   Recommended Feeding/Eating Techniques no straws;small sips/bites  (slow rate, upright for all PO)   Risks and Benefits of Treatment have been explained. Yes   Patient, family and/or staff in agreement with Plan of Care Yes   Clinical Impression Comments SLP: Clinical swallow eval completed per MD order. Pt presents with functional oropharyngeal swallow mechanism. Oral mech exam unremarkable. Assessed with thin liquids, puree, and higher texture solids. Oral phase WFL. Pharyngeal phase WFL, no overt s/sx of aspiration. Recommend regular diet/thin liquids. Ensure pt is fully alert and upright for all PO, taking small bites/sips at slow rate. No straws per ENT. No additional SLP services indicated.   Total Evaluation Time   Total Evaluation Time (Minutes) 10

## 2019-03-27 NOTE — PROGRESS NOTES
03/26/19 2000   Quick Adds   Type of Visit Initial Occupational Therapy Evaluation   Living Environment   Lives With child(britton), adult;child(britton), dependent;significant other  (at least a few other/adult children living with her, 9 kids)   Living Arrangements apartment   Home Accessibility stairs to enter home   Number of Stairs, Main Entrance (15 steps)   Stair Railings, Main Entrance railings on both sides of stairs   Transportation Anticipated family or friend will provide   Living Environment Comment Pt has combined tub/shower, has one of her sons or daughter available to assist most of the time. Sleeps in a regular flat bed. Pt does not work outside the home but  does   Self-Care   Usual Activity Tolerance moderate   Current Activity Tolerance fair   Regular Exercise No   Equipment Currently Used at Home none   Functional Level   Ambulation 0-->independent   Transferring 0-->independent   Toileting 2-->assistive person   Bathing 2-->assistive person   Dressing 2-->assistive person   Eating 0-->independent   Communication 0-->understands/communicates without difficulty   Swallowing 0-->swallows foods/liquids without difficulty   Cognition 0 - no cognition issues reported   Fall history within last six months yes   Number of times patient has fallen within last six months 2   Which of the above functional risks had a recent onset or change? ambulation;transferring;toileting;dressing;bathing;fall history   General Information   Onset of Illness/Injury or Date of Surgery - Date 03/25/19   Referring Physician Onel Rosales MD   Additional Occupational Profile Info/Pertinent History of Current Problem 54 year old female with history of Cushing s disease s/p endoscopic transnasal pituitary resection    Precautions/Limitations fall precautions  (nasal precautions)   Cognitive Status Examination   Orientation orientation to person, place and time   Visual Perception   Visual Perception No deficits were  "identified   Range of Motion (ROM)   ROM Comment OT: BUE WFL   Strength   Strength Comments OT: apperas deconditioned, NT formally 2/2 pt falling asleep    Muscle Tone Assessment   Muscle Tone Comments OT: BUE overall deconditioned   Mobility   Bed Mobility Comments min A   Transfer Skills   Transfer Comments OT:CGA   Balance   Balance Comments OT: CGA   Instrumental Activities of Daily Living (IADL)   IADL Comments OT:Pt's family assists w/ IADLS   Activities of Daily Living Analysis   Impairments Contributing to Impaired Activities of Daily Living balance impaired;strength decreased;post surgical precautions   General Therapy Interventions   Planned Therapy Interventions ADL retraining;balance training;strengthening;transfer training   Clinical Impression   Criteria for Skilled Therapeutic Interventions Met yes, treatment indicated   OT Diagnosis decreased ind in ADLS/IADLS   Influenced by the following impairments generalized weakness and precautions   Assessment of Occupational Performance 1-3 Performance Deficits   Identified Performance Deficits decreased ind in ADLS/IADLS   Clinical Decision Making (Complexity) Low complexity   Therapy Frequency 5 times/wk   Predicted Duration of Therapy Intervention (days/wks) 4/12/19   Anticipated Discharge Disposition Transitional Care Facility   Risks and Benefits of Treatment have been explained. Yes   Patient, Family & other staff in agreement with plan of care Yes   [facility] TM \"6 Clicks\"   2016, Trustees of [facility], under license to [employer].  All rights reserved.   6 Clicks Short Forms Daily Activity Inpatient Short Form   [facility]  \"6 Clicks\" Daily Activity Inpatient Short Form   1. Putting on and taking off regular lower body clothing? 3 - A Little   2. Bathing (including washing, rinsing, drying)? 3 - A Little   3. Toileting, which includes using toilet, bedpan or urinal? 3 - A Little   4. Putting on and taking off " regular upper body clothing? 4 - None   5. Taking care of personal grooming such as brushing teeth? 4 - None   6. Eating meals? 4 - None   Daily Activity Raw Score (Score out of 24.Lower scores equate to lower levels of function) 21   Total Evaluation Time   Total Evaluation Time (Minutes) 5

## 2019-03-27 NOTE — PROGRESS NOTES
"Otolaryngology Progress Note  March 27, 2019    S: No acute events overnight. Continues to have a headache, but improved compared to yesterday.     O: /67 (BP Location: Right arm)   Pulse 76   Temp 98  F (36.7  C) (Axillary)   Resp 16   Ht 1.6 m (5' 3\")   Wt 83.1 kg (183 lb 3.2 oz)   LMP  (LMP Unknown)   SpO2 99%   BMI 32.45 kg/m     General: Alert and oriented x 3, No acute distress   HEENT: EOMI. HB 1/6.  Merocels present in b/l nasal passages. No anterior nasal drainage or bleeding. Oropharynx clear.    Pulmonary: Breathing non-labored, no stridor, no accessory muscle use.      Intake/Output Summary (Last 24 hours) at 3/27/2019 0739  Last data filed at 3/27/2019 0700  Gross per 24 hour   Intake 1255 ml   Output 1400 ml   Net -145 ml       LABS:  ROUTINE IP LABS (Last four results)  BMP  Recent Labs   Lab 03/27/19  0424 03/26/19  0659 03/26/19  0312 03/25/19  1706   * 143 144 144   POTASSIUM 3.2* 3.6 3.8 4.0   CHLORIDE 115* 116* 116* 118*   KI 8.2* 7.4* 7.2* 6.8*   CO2 20 21 21 19*   BUN 6* 5* 4* 6*   CR 0.54 0.49* 0.46* 0.50*   * 112* 126* 123*     CBC  Recent Labs   Lab 03/27/19  0424 03/26/19  0659 03/26/19  0312 03/25/19  1217   WBC 7.1 7.9 8.4  --    RBC 3.76* 3.73* 3.76*  --    HGB 10.7* 10.8* 10.7* 11.8   HCT 35.9 34.8* 35.0  --    MCV 96 93 93  --    MCH 28.5 29.0 28.5  --    MCHC 29.8* 31.0* 30.6*  --    RDW 15.4* 15.2* 15.1*  --     254 253  --      INRNo lab results found in last 7 days.    A/P: Francis Flores is a 54 year old female with a past medical history of signs and symptoms of Cushing disease found to have a pituitary adenoma. She is now POD#2 s/p endoscopic stealth-guided transsphenoidal resection of pituitary tumor.     - Nasal precautions - no nose blowing, sneeze/cough with mouth open, no lifting or bending over, no strenuous activity.  - Ensure adequate bowel regimen to avoid straining  - keflex for prophylaxis while nasal packing is in place  - ENT " will remove nasal packing prior to discharge  - endocrine following, trending cortisol levels   - Avoid nasal saline spray in the hospital to avoid confusion with CSF, recommend starting nasal saline spray on discharge    Dispo: OK to transfer to the floor from an ENT standpoint. Primary cares per NSG    -- Patient and above plan to be discussed with Dr. Jean-Paul Jordan PA-C  Otolaryngology-Head & Neck Surgery  Please contact ENT with questions by dialing [phone] and entering job code [phone] when prompted.

## 2019-03-27 NOTE — PLAN OF CARE
Status:Had pituitary tumor resection and came up from 4A at 12:10pm with interperter present.  Replaced potassium. Recheck at 5pm   VS:stable   Neuros:intact except N in hands and feet, denies tingling. States that fingers feels swollen and tight.  GI:PO'ing regular diet.  :Voided 400 ml on arrival.?  IV:SL'd  Activity:Up with one assist to bathroom and came up from 4A in wheelchair.  Pain:Denied.  Skin:nasal packs  Social:2 kids at bedside     Plan of care:Therapies to see.

## 2019-03-28 ENCOUNTER — TELEPHONE (OUTPATIENT)
Dept: ENDOCRINOLOGY | Facility: CLINIC | Age: 54
End: 2019-03-28

## 2019-03-28 ENCOUNTER — APPOINTMENT (OUTPATIENT)
Dept: PHYSICAL THERAPY | Facility: CLINIC | Age: 54
End: 2019-03-28
Attending: NEUROLOGICAL SURGERY
Payer: COMMERCIAL

## 2019-03-28 ENCOUNTER — PATIENT OUTREACH (OUTPATIENT)
Dept: CARE COORDINATION | Facility: CLINIC | Age: 54
End: 2019-03-28

## 2019-03-28 VITALS
WEIGHT: 183.2 LBS | DIASTOLIC BLOOD PRESSURE: 72 MMHG | TEMPERATURE: 96.5 F | RESPIRATION RATE: 16 BRPM | HEIGHT: 63 IN | BODY MASS INDEX: 32.46 KG/M2 | HEART RATE: 83 BPM | OXYGEN SATURATION: 99 % | SYSTOLIC BLOOD PRESSURE: 119 MMHG

## 2019-03-28 LAB
ANION GAP SERPL CALCULATED.3IONS-SCNC: 6 MMOL/L (ref 3–14)
BUN SERPL-MCNC: 7 MG/DL (ref 7–30)
CALCIUM SERPL-MCNC: 8.8 MG/DL (ref 8.5–10.1)
CHLORIDE SERPL-SCNC: 111 MMOL/L (ref 94–109)
CO2 SERPL-SCNC: 25 MMOL/L (ref 20–32)
CORTIS SERPL-MCNC: 6.8 UG/DL (ref 4–22)
CREAT SERPL-MCNC: 0.51 MG/DL (ref 0.52–1.04)
ERYTHROCYTE [DISTWIDTH] IN BLOOD BY AUTOMATED COUNT: 15.5 % (ref 10–15)
GFR SERPL CREATININE-BSD FRML MDRD: >90 ML/MIN/{1.73_M2}
GLUCOSE SERPL-MCNC: 113 MG/DL (ref 70–99)
HCT VFR BLD AUTO: 38.3 % (ref 35–47)
HGB BLD-MCNC: 11.3 G/DL (ref 11.7–15.7)
MAGNESIUM SERPL-MCNC: 2.2 MG/DL (ref 1.6–2.3)
MCH RBC QN AUTO: 28.1 PG (ref 26.5–33)
MCHC RBC AUTO-ENTMCNC: 29.5 G/DL (ref 31.5–36.5)
MCV RBC AUTO: 95 FL (ref 78–100)
PHOSPHATE SERPL-MCNC: 4.5 MG/DL (ref 2.5–4.5)
PLATELET # BLD AUTO: 260 10E9/L (ref 150–450)
POTASSIUM SERPL-SCNC: 4 MMOL/L (ref 3.4–5.3)
RBC # BLD AUTO: 4.02 10E12/L (ref 3.8–5.2)
SODIUM SERPL-SCNC: 142 MMOL/L (ref 133–144)
WBC # BLD AUTO: 7.2 10E9/L (ref 4–11)

## 2019-03-28 PROCEDURE — 97116 GAIT TRAINING THERAPY: CPT | Mod: GP

## 2019-03-28 PROCEDURE — 97750 PHYSICAL PERFORMANCE TEST: CPT | Mod: GP

## 2019-03-28 PROCEDURE — 84100 ASSAY OF PHOSPHORUS: CPT | Performed by: NEUROLOGICAL SURGERY

## 2019-03-28 PROCEDURE — 80048 BASIC METABOLIC PNL TOTAL CA: CPT | Performed by: NEUROLOGICAL SURGERY

## 2019-03-28 PROCEDURE — 36415 COLL VENOUS BLD VENIPUNCTURE: CPT | Performed by: NEUROLOGICAL SURGERY

## 2019-03-28 PROCEDURE — 84132 ASSAY OF SERUM POTASSIUM: CPT | Performed by: NEUROLOGICAL SURGERY

## 2019-03-28 PROCEDURE — 85027 COMPLETE CBC AUTOMATED: CPT | Performed by: NEUROLOGICAL SURGERY

## 2019-03-28 PROCEDURE — 82533 TOTAL CORTISOL: CPT | Performed by: NEUROLOGICAL SURGERY

## 2019-03-28 PROCEDURE — 25000125 ZZHC RX 250: Performed by: STUDENT IN AN ORGANIZED HEALTH CARE EDUCATION/TRAINING PROGRAM

## 2019-03-28 PROCEDURE — 83735 ASSAY OF MAGNESIUM: CPT | Performed by: NEUROLOGICAL SURGERY

## 2019-03-28 PROCEDURE — 25000132 ZZH RX MED GY IP 250 OP 250 PS 637: Performed by: NURSE PRACTITIONER

## 2019-03-28 PROCEDURE — 25000132 ZZH RX MED GY IP 250 OP 250 PS 637: Performed by: STUDENT IN AN ORGANIZED HEALTH CARE EDUCATION/TRAINING PROGRAM

## 2019-03-28 PROCEDURE — 25800030 ZZH RX IP 258 OP 636: Performed by: STUDENT IN AN ORGANIZED HEALTH CARE EDUCATION/TRAINING PROGRAM

## 2019-03-28 RX ORDER — AMOXICILLIN 250 MG
3 CAPSULE ORAL 2 TIMES DAILY
Qty: 30 TABLET | Refills: 0 | Status: SHIPPED | OUTPATIENT
Start: 2019-03-28

## 2019-03-28 RX ORDER — ONDANSETRON 4 MG/1
4-8 TABLET, ORALLY DISINTEGRATING ORAL EVERY 6 HOURS PRN
Qty: 30 TABLET | Refills: 0 | Status: SHIPPED | OUTPATIENT
Start: 2019-03-28 | End: 2020-07-29

## 2019-03-28 RX ORDER — TRAMADOL HYDROCHLORIDE 50 MG/1
50 TABLET ORAL EVERY 6 HOURS PRN
Qty: 30 TABLET | Refills: 0 | Status: SHIPPED | OUTPATIENT
Start: 2019-03-28 | End: 2019-07-15

## 2019-03-28 RX ORDER — POLYETHYLENE GLYCOL 3350 17 G/17G
17 POWDER, FOR SOLUTION ORAL 2 TIMES DAILY
Qty: 30 PACKET | Refills: 0 | Status: SHIPPED | OUTPATIENT
Start: 2019-03-28

## 2019-03-28 RX ORDER — ECHINACEA PURPUREA EXTRACT 125 MG
2 TABLET ORAL
Qty: 50 ML | Refills: 0 | Status: SHIPPED | OUTPATIENT
Start: 2019-03-28

## 2019-03-28 RX ADMIN — RANITIDINE 150 MG: 150 TABLET ORAL at 08:27

## 2019-03-28 RX ADMIN — SENNOSIDES AND DOCUSATE SODIUM 3 TABLET: 8.6; 5 TABLET ORAL at 08:27

## 2019-03-28 RX ADMIN — CEPHALEXIN 500 MG: 250 CAPSULE ORAL at 00:28

## 2019-03-28 RX ADMIN — ACETAMINOPHEN 650 MG: 325 TABLET, FILM COATED ORAL at 08:27

## 2019-03-28 RX ADMIN — CEPHALEXIN 500 MG: 250 CAPSULE ORAL at 05:00

## 2019-03-28 RX ADMIN — PANTOPRAZOLE SODIUM 40 MG: 40 TABLET, DELAYED RELEASE ORAL at 08:27

## 2019-03-28 RX ADMIN — CALCIUM 500 MG: 500 TABLET ORAL at 08:27

## 2019-03-28 RX ADMIN — POLYETHYLENE GLYCOL 3350 17 G: 17 POWDER, FOR SOLUTION ORAL at 08:27

## 2019-03-28 RX ADMIN — POTASSIUM PHOSPHATE, MONOBASIC AND POTASSIUM PHOSPHATE, DIBASIC 20 MMOL: 224; 236 INJECTION, SOLUTION INTRAVENOUS at 01:48

## 2019-03-28 RX ADMIN — VITAMIN D, TAB 1000IU (100/BT) 2000 UNITS: 25 TAB at 08:27

## 2019-03-28 ASSESSMENT — ACTIVITIES OF DAILY LIVING (ADL)
ADLS_ACUITY_SCORE: 20
ADLS_ACUITY_SCORE: 18

## 2019-03-28 NOTE — PLAN OF CARE
Status: Pt on 6A s/p pituitary tumor resection. [place] speaking. Receiving  services & has family at bedside assisting w/ communication.    Neuros: A&O x4. Pt has numbness to hands & feet at reported baseline.   GI: Reg diet. BS+, passing flatus. No BM over shift.    : Strict I&O. Voiding spontaneously. UO 1,100 mL over shift.  IV: Phos replacement given. Now SL.     Activity: Up w/ SBA.   Pain: IV site discomfort - no S/S of infiltration. Declined heat or cold.   Skin: Scant dried blood around nares. No bleeding or drainage noted overnight.   Social: Son at bedside, assisting in cares.  Plan of care: Continue to monitor & follow POC.

## 2019-03-28 NOTE — PLAN OF CARE
Discharge Planner PT  6A  Patient plan for discharge: home with multiple family members to A as needed  Current status:  arrived 10 minutes late to session. Progressed gait endurance and pattern with out assistive device for ~100ft to/from 6B stair well - mild intermittent unsteadiness on feet and mild lateral deviation from path therefore, PT completed DGI to facilitate a safe discharge home. Pt scored 20/24 on DGI (see notes for details). No assistive device recommend as pt demonstrates that she is not a high falls risk. Additional, 1 x 13 stairs completed with B rails, self selected speed and step to pattern for ascent/descent.     Nursing: SBA     Barriers to return to prior living situation: medical status  Recommendations for discharge: home with family members to assist as needed  Rationale for recommendations: Pt demonstrates that she is safe to disch home after session today pending medical stability.       Entered by: Smitha Yancey 03/28/2019 10:58 AM

## 2019-03-28 NOTE — PROGRESS NOTES
Neurosurgery Progress Note    S: No Acute Events Overnight; Feeling Well This Morning    O:  Exam:  General: Awake;  Alert, In No Acute Distress  Pulm: Breathing Comfortably on Room Air  Mental status: Oriented x 3  Cranial Nerves: Cranial Nerves II-XII Intact Bilaterally  Strength:      Del Tr Bi WE WF Gr  R 5 5 5 5 5 5  L 5 5 5 5 5 5     HF KE KF DF PF   R 5 5 5 5 5   L 5 5 5 5 5     Pronator Drift: Absent  Sensory: Intact to Light Touch    Assessment:  54 year old female with history of Cushing s disease s/p endoscopic transnasal pituitary resection     Plan by problem:     Neuro:   Neuro Examination Q 4 HR  Monitor for CSF Leak  Tylenol PRN Pain    Cardiovascular: Goal SBP < 160 mmHg    Pulmonary: Nasal Precautions    Gastrointestinal: Advance Diet as Tolerated; Bowel Regimen     Renal: Monitor Urine Output Q 1 HR; RN to Notify Neurosurgery for Urine Output > 300 mL/hr for 3 consecutive hours    Endocrine: H/O Cushings Disease; Monitor for DI; Monitor Cortisol Level Q 12 HR    Infectious: Kephlex 500 mg Q 6 HR with Nasal Packing Intact    PT/OT: OT: home with assist PT: home with assist     DVT prophylaxis: Sequential compression devices     Ulcer prophylaxis: Home  protonix and zantac ordered      DISPO:  6A; Ready for Discharge 3/28/2019    Barriers: none     -----------------------------------  Heidy Francis MD, MS  Neurosurgery PGY-2    I have reviewed the history above and agree with the resident's assessment and plan.  RODRIGUEZ Crain MD

## 2019-03-28 NOTE — PROGRESS NOTES
03/28/19 1100   Signing Clinician's Name / Credentials   Signing clinician's name / credentials Smitha Yancey DPT   Dynamic Gait Index (Haim and [place], 1995)   Gait Level Surface 2   Change in Gait Speed 3   Gait and Horizontal Head Turns 3   Gait with Vertical Head Turns 3   Gait and Pivot Turns 2   Step Over Obstacle 3   Step Around Obstacles 3   Steps 1   Total Dynamic Gait Index Score  (A score of 19 or less has been correlated to an increased risk of falls in community dwelling older adults, patients with vestibular disorders, and patients with MS.)   Total Score (out of 24) 20     Dynamic Gait Index (DGI):The DGI is a measure of balance during gait that is reliable and valid for the elderly and individuals with Parkinson's disease, MS, vestibular disorders, or s/p stroke. Gait assistive device used: none    Patient score: 20/24  Scores ?19/24 indicate an increased risk for falls according to Nataly et al 2000  Minimal Detectable Change = 2.9 in community dwelling elderly according to Oneil et al 2011    Assessment (rationale for performing, application to patient s function & care plan): PT administered the DGI as a formal balance assessment to assess whether or not the pt needed an assistive device to facilitate a safe disch home.  Pt scored 20/24 on DGI therefore, pt demonstrated that she is not at an increased risk for falls therefore, per scoring and PT clinical judgement pt is safe to discharge home without need for an  assistive device.    Minutes billed as physical performance test: 9

## 2019-03-28 NOTE — TELEPHONE ENCOUNTER
Patient is an established patient of Dr James. Scheduled for clinic follow up with her in 2 weeks.

## 2019-03-28 NOTE — DISCHARGE SUMMARY
[facility] Discharge Summary and Instructions    Francis Flores MRN# [id number]   Age: 54 year old YOB: 1965     Date of Admission:  3/25/2019  Date of Discharge::  3/28/2019  Admitting Physician:  Derrell Crain MD  Discharge Physician:  Derrell Crain MD          Admission Diagnoses:   Pituitary adenoma (H) [D35.2]          Discharge Diagnosis:   Pituitary adenoma (H) [D35.2]          Procedures:   3/25/2019  Endoscopic Stealth-guided transsphenoidal resection of pituitary tumor.                Brief History of Illness:   Ms. Flores is a 54-year-old female who presented with signs and symptoms of Cushing disease.  A workup revealed a pituitary adenoma on the left side of the sella.  The adenoma has been growing larger.  The adenoma creates ACTH and has been growing over time.  Therefore, she desired to have a surgical intervention.                Hospital Course:   Postoperatively the patient was transferred to  due to post operative hypotension.   Was able to transfer to general floor POD #1.   Endocrinology followed patient throughout her stay.  Patient did necessiate supplemental steroid use X 1 dose.  Final Serum Cortisol levels were noted to be 6.8  Patient did not display signs of Diabetes Insipidus and did not necessitate administration of DDVAP.   Patient counseled to continue to drink to thirst.    ENT followed patient, no signs of CSF leak were noted. Patient denied salty/metallic taste in mouth.  Patient instructed on sinus precautions as listed below.   Nasal splints were discontinued prior to discharge.  PT and OT evaluated patient following surgery and felt patient was safe to discharge home with assist.   Patient was ambulating independently, voiding without khan, passing bowel movements, pain was adequately controlled on oral analgesia, and patient was medically and neurologically stable upon discharge.  Surgical pathology was pending at time of  "discharge.   Patient will need to follow up with Dr Crain in 3 months with MRI brain.  Patient will need to follow up with Endocrinology in 2 weeks.  Patient will follow up in 3 weeks in ENT clinic.   Patient will need to have free T4 and sodium level drawn on 4/1/19 (this has been ordered).    Please follow sinus precautions upon discharge until seen by ENT, precautions as follows:   1.  Do not blow nose  2.  Dab gently under the nose to collect drainage  3.  Do not pick nose or insert tissues/cloths inside of nose  4.  Sneeze with mouth open  5.  Do not strain, please take stool softeners as prescribed            Discharge Medications:     Current Discharge Medication List      CONTINUE these medications which have NOT CHANGED    Details   calcium carbonate (OS- MG [place]. CA) 500 tablet Take 1 tablet by mouth 2 times daily      ranitidine (ZANTAC) 150 MG tablet Take 1 tablet by mouth 2 times daily.      VITAMIN D, CHOLECALCIFEROL, PO Take 2,000 Units by mouth daily      ibuprofen 200 MG capsule Take 400 mg by mouth every 4 hours as needed for fever  Qty: 60 capsule, Refills: 3    Comments: Maximum 600 mg every 8 hours  Associated Diagnoses: Pituitary adenoma (H)      pantoprazole sodium 40 MG tablet Take 1 packet by mouth daily.            Exam:   Physical Exam  /72 (BP Location: Left arm)   Pulse 83   Temp 96.5  F (35.8  C) (Oral)   Resp 16   Ht 1.6 m (5' 3\")   Wt 83.1 kg (183 lb 3.2 oz)   LMP  (LMP Unknown)   SpO2 99%   BMI 32.45 kg/m    General: Appears comfortable, NAD  Wound: Incision, clean, dry, intact without strikethrough  Neurologic Exam:  - AOx3.  - Follows commands.  - Speech fluent, spontaneous. No aphasia or dysarthria.  - No gaze preference. No apparent hemineglect.  - PERRL, EOMI.  - Face symmetric with sensation intact to light touch.  - Palate elevates symmetrically, uvula midline, tongue protrudes midline.  - Trapezii and sternocleidomastoid muscles 5/5 bilaterally.  - No " pronator drift.  Motor: Normal bulk/tone; no tremor, rigidity, or bradykinesia.   Right:  Deltoid 5/5, tricep 5/5, bicep 5/5, wrist flexor 5/5, wrist extensor 5/5, finger intrinsic 5/5  Left:  Deltoid 5/5, tricep 5/5, bicep 5/5, wrist flexor 5/5, wrist extensor 5/5, finger intrinsic 5/5  Right: Iliopsoas 5/5, quadricep 5/5, hamstring 5/5, tibialis anterior 5/5, gastrocnemius 5/5, EHL 5/5  Left:  Iliopsoas 5/5, quadricep 5/5, hamstring 5/5, tibialis anterior 5/5, gastrocnemius 5/5, EHL 5/5    Sensation intact in bilateral L4-S1 dermatones              Discharge Instructions and Follow-Up:   Discharge diet: Regular   Discharge activity: You may advance activity as tolerated. No strenuous exercise or heay lifting greater than 10 lbs for 4 weeks or until seen and cleared in clinic.   Discharge follow-up: Please follow up with Neurosurgery in two weeks    Please follow up with ENT on 4/17/19 with Dr Jeong as scheduled    Please follow up with Endocrinology and Dr Weir in 2 weeks, their office will contact you.  Please go have your blood drawn (sodium and free T4)on 4/1/2019 and have results faxed to [phone].       Appointments on [facility]/or [facility] (with [facility] or [facility] provider or service). Call [phone] if you haven't heard regarding these appointments within 7 days of discharge.     Wound care: Ok to shower,however no scrubbing of the wound and no soaking of the wound, meaning no bathtubs or swimming pools. Pat dry only. Leave wound open to air.  Sutures are not absorbable and need to be removed in 2 weeks. If patient still at rehab by this time, the sutures may be removed by the rehab physician if he or she considers that the wound has healed completely.     Please call if you have:  1. increased pain, redness, drainage, swelling at your incision  2. fevers > 101.5 F degrees  3. with any questions or concerns.  You may reach the Neurosurgery clinic at [phone] during regular work hours. ER  at [phone].    and ask for the Neurosurgery Resident on call at [phone], during off hours or weekends.         Discharge Disposition:   Discharged to home        MIREYA Talley, CNP  Department of Neurosurgery  Pager: [phone]

## 2019-03-28 NOTE — PLAN OF CARE
Physical Therapy Discharge Summary    Reason for therapy discharge:    Discharged to home.    Progress towards therapy goal(s). See goals on Care Plan in [facility] electronic health record for goal details.  Goals met    Therapy recommendation(s):    No further therapy is recommended.

## 2019-03-28 NOTE — PLAN OF CARE
"VSS. Pt reports mild pain in RUQ. Charge nurse made aware. Pt refused lab draws x2 this shift. Agreed after encouragement, waiting for K+ and Na levels. Reg diet with tierney counts. Pt up w/ SBA however frequently gets up without calling w/ aid of family.     utilized.  Numbness on hands and feet at baseline, fingers feel \"swollen or tight\". Sinus precautions. Total of 750 ml total output over course of shift.   "

## 2019-03-28 NOTE — PLAN OF CARE
Occupational Therapy Discharge Summary    Reason for therapy discharge:    Discharged to home.    Progress towards therapy goal(s). See goals on Care Plan in [facility] electronic health record for goal details.  Goals partially met.  Barriers to achieving goals:   discharge from facility.    Therapy recommendation(s):    Continue home exercise program.

## 2019-03-28 NOTE — PROGRESS NOTES
"Otolaryngology Progress Note  March 28, 2019    S: No acute events overnight. Continues to have burning pain that radiates from the left nasal passage to the left frontal area.    O: /72 (BP Location: Left arm)   Pulse 83   Temp 96.5  F (35.8  C) (Oral)   Resp 16   Ht 1.6 m (5' 3\")   Wt 83.1 kg (183 lb 3.2 oz)   LMP  (LMP Unknown)   SpO2 99%   BMI 32.45 kg/m     General: Alert and oriented x3, in no acute distress   HEENT: EOMI. HB 1/6. Merocels removed. No anterior nasal drainage or bleeding. Oropharynx clear.    Pulmonary: Breathing non-labored, no stridor, no accessory muscle use.    Intake/Output Summary (Last 24 hours) at 3/28/2019 1023  Last data filed at 3/28/2019 0800  Gross per 24 hour   Intake 1440 ml   Output 3450 ml   Net -2010 ml     LABS:  ROUTINE IP LABS (Last four results)  BMP  Recent Labs   Lab 03/28/19 0411 03/27/19  2252 03/27/19  0424 03/26/19  0659 03/26/19  0312    144 145* 143 144   POTASSIUM 4.0 3.9 3.2* 3.6 3.8   CHLORIDE 111*  --  115* 116* 116*   KI 8.8  --  8.2* 7.4* 7.2*   CO2 25  --  20 21 21   BUN 7  --  6* 5* 4*   CR 0.51*  --  0.54 0.49* 0.46*   *  --  149* 112* 126*     CBC  Recent Labs   Lab 03/28/19  0411 03/27/19  0424 03/26/19  0659 03/26/19  0312   WBC 7.2 7.1 7.9 8.4   RBC 4.02 3.76* 3.73* 3.76*   HGB 11.3* 10.7* 10.8* 10.7*   HCT 38.3 35.9 34.8* 35.0   MCV 95 96 93 93   MCH 28.1 28.5 29.0 28.5   MCHC 29.5* 29.8* 31.0* 30.6*   RDW 15.5* 15.4* 15.2* 15.1*    238 254 253     A/P: Francis Flores is a 54 year old female with a past medical history of signs and symptoms of Cushing disease found to have a pituitary adenoma. She is now POD #3 s/p endoscopic stealth-guided transsphenoidal resection of pituitary tumor.     - Merocels removed at bedside, okay to discontinue Keflex  - Nasal precautions - no nose blowing, sneeze/cough with mouth open, no lifting or bending over, no strenuous activity  - Ensure adequate bowel regimen to avoid " straining  - Endocrine following, trending cortisol levels   - Recommend starting nasal saline spray on discharge, Q2H PRN when awake  - ENT follow-up scheduled with Dr. Jeong on 4/17/2019    Dispo: OK to discharge from an ENT standpoint. Primary cares per NSGY.    -- Patient and above plan discussed with Dr. Jeong.    Eugenie Estevez MD PGY-1  Otolaryngology-Head & Neck Surgery  Please contact ENT with questions by dialing [phone] and entering job code [phone] when prompted.

## 2019-03-28 NOTE — PLAN OF CARE
Patient and family deferred need for  phone for discharge instructions and teach back used to assure understanding of teaching. PIV's removed. Patient safe for discharge home with assist.    Of note patient's son escorted to room this morning by security after throwing away other patient's food to make room for his on the 4th floor. Son was aggravated again over waiting for pain medication that he was told was too early to give;  Calmed down when had discharge paperwork in hand.

## 2019-03-28 NOTE — TELEPHONE ENCOUNTER
[facility] Call Center    Phone Message    May a detailed message be left on voicemail: yes    Reason for Call: Other: Per hospital discharge instructions, pt is to f/u with Dr. Weir in clinic in 2 weeks. Please assist pt with appt. Thanks!     Action Taken: Message routed to:  [facility] ([facility] Endocrine

## 2019-03-28 NOTE — PROGRESS NOTES
Shift: 1530 - 1930  VS: Temp: 96.8  F (36  C) Temp src: Oral BP: 107/61 Pulse: 83 Heart Rate: 83 Resp: 18 SpO2: 100 % O2 Device: None (Room air)    Pain: Head pain, tylenol given. Decreased pain reported.  Neuro: A&Ox4. Neuro's intact. Cooperative with care.   Cardiac:   WDL  Respiratory: Lung sounds clear on RA.   GI/Diet/Appetite: Strict I&O. Tolerating a regular diet. LBM 3/27  :  Voiding w/o difficulty. Strict I&O.   LDA's: PIV SL  Skin: WDL  Activity: SBA.  Tests/Procedures:   Pertinent Labs/Lab Collection: Needs PHOS infusion, communicated in report off.      Plan: Continue w/POC

## 2019-03-31 ENCOUNTER — NURSE TRIAGE (OUTPATIENT)
Dept: NURSING | Facility: CLINIC | Age: 54
End: 2019-03-31

## 2019-03-31 NOTE — TELEPHONE ENCOUNTER
Francis's daughter called questions about Francis's discharge instructions and discharge medications.    I gave daughterSlim all the information from discharge papers. She stated understanding and explained all to Francis.    Kym JORDAN RN [facility]

## 2019-04-01 ENCOUNTER — TRANSFERRED RECORDS (OUTPATIENT)
Dept: HEALTH INFORMATION MANAGEMENT | Facility: CLINIC | Age: 54
End: 2019-04-01

## 2019-04-01 LAB — COPATH REPORT: NORMAL

## 2019-04-08 ENCOUNTER — TELEPHONE (OUTPATIENT)
Dept: ENDOCRINOLOGY | Facility: CLINIC | Age: 54
End: 2019-04-08

## 2019-04-08 NOTE — TELEPHONE ENCOUNTER
----- Message from Tete Weir MD sent at 4/8/2019  1:52 PM CDT -----  Regarding: result  Blood work received from [facility] reviewed.    Sodium day 7/1/1939 looks good.    Could you tell the patient?  She is Filipino speaking,  understand English.    Tete

## 2019-04-08 NOTE — PROGRESS NOTES
Received labs from outside facility  April 1, 2019  Free T4 0.92  TSH 1.33  Sodium 139 (day 7 sodium with check looks okay)  Potassium 4.0  Creatinine 0.78  Calcium 9.2  BUN 4.0  Glucose 106  GFR more than 60.    Tete Weir MD  Staff Physician  Endocrinology and Metabolism  [facility]  License: [place] [id number]  Pager: [phone]

## 2019-04-09 ENCOUNTER — HOSPITAL ENCOUNTER (EMERGENCY)
Facility: CLINIC | Age: 54
Discharge: HOME | End: 2019-04-09
Attending: EMERGENCY MEDICINE | Admitting: EMERGENCY MEDICINE
Payer: COMMERCIAL

## 2019-04-09 ENCOUNTER — OFFICE VISIT (OUTPATIENT)
Dept: ENDOCRINOLOGY | Facility: CLINIC | Age: 54
End: 2019-04-09
Payer: COMMERCIAL

## 2019-04-09 VITALS
RESPIRATION RATE: 16 BRPM | TEMPERATURE: 98.6 F | BODY MASS INDEX: 31.82 KG/M2 | WEIGHT: 186.4 LBS | HEART RATE: 75 BPM | HEIGHT: 64 IN | SYSTOLIC BLOOD PRESSURE: 119 MMHG | DIASTOLIC BLOOD PRESSURE: 75 MMHG | OXYGEN SATURATION: 93 %

## 2019-04-09 DIAGNOSIS — Z98.890 HISTORY OF PITUITARY SURGERY: ICD-10-CM

## 2019-04-09 DIAGNOSIS — R10.84 ABDOMINAL PAIN, GENERALIZED: ICD-10-CM

## 2019-04-09 DIAGNOSIS — R11.2 NON-INTRACTABLE VOMITING WITH NAUSEA, UNSPECIFIED VOMITING TYPE: ICD-10-CM

## 2019-04-09 DIAGNOSIS — E24.9 CUSHING'S SYNDROME (H): Primary | ICD-10-CM

## 2019-04-09 LAB
ALBUMIN SERPL-MCNC: 3 G/DL (ref 3.4–5)
ALBUMIN UR-MCNC: NEGATIVE MG/DL
ALP SERPL-CCNC: 92 U/L (ref 40–150)
ALT SERPL W P-5'-P-CCNC: 21 U/L (ref 0–50)
ANION GAP SERPL CALCULATED.3IONS-SCNC: 10 MMOL/L (ref 3–14)
APPEARANCE UR: ABNORMAL
AST SERPL W P-5'-P-CCNC: 14 U/L (ref 0–45)
BACTERIA #/AREA URNS HPF: ABNORMAL /HPF
BASOPHILS # BLD AUTO: 0 10E9/L (ref 0–0.2)
BASOPHILS NFR BLD AUTO: 0.5 %
BILIRUB SERPL-MCNC: 0.3 MG/DL (ref 0.2–1.3)
BILIRUB UR QL STRIP: NEGATIVE
BUN SERPL-MCNC: 6 MG/DL (ref 7–30)
CALCIUM SERPL-MCNC: 9.6 MG/DL (ref 8.5–10.1)
CHLORIDE SERPL-SCNC: 105 MMOL/L (ref 94–109)
CO2 SERPL-SCNC: 24 MMOL/L (ref 20–32)
COLOR UR AUTO: YELLOW
CORTICOSTER 1H P 250 UG ACTH SERPL-SCNC: 20 UG/DL
CORTICOSTER SERPL-MCNC: 6.3 UG/DL (ref 4–22)
CREAT SERPL-MCNC: 0.73 MG/DL (ref 0.52–1.04)
DIFFERENTIAL METHOD BLD: ABNORMAL
EOSINOPHIL # BLD AUTO: 0.1 10E9/L (ref 0–0.7)
EOSINOPHIL NFR BLD AUTO: 1.3 %
ERYTHROCYTE [DISTWIDTH] IN BLOOD BY AUTOMATED COUNT: 14.5 % (ref 10–15)
GFR SERPL CREATININE-BSD FRML MDRD: >90 ML/MIN/{1.73_M2}
GLUCOSE SERPL-MCNC: 92 MG/DL (ref 70–99)
GLUCOSE UR STRIP-MCNC: NEGATIVE MG/DL
HCT VFR BLD AUTO: 41.4 % (ref 35–47)
HGB BLD-MCNC: 12.4 G/DL (ref 11.7–15.7)
HGB UR QL STRIP: NEGATIVE
IMM GRANULOCYTES # BLD: 0 10E9/L (ref 0–0.4)
IMM GRANULOCYTES NFR BLD: 0.5 %
KETONES UR STRIP-MCNC: NEGATIVE MG/DL
LEUKOCYTE ESTERASE UR QL STRIP: ABNORMAL
LIPASE SERPL-CCNC: 100 U/L (ref 73–393)
LYMPHOCYTES # BLD AUTO: 2 10E9/L (ref 0.8–5.3)
LYMPHOCYTES NFR BLD AUTO: 25.7 %
MCH RBC QN AUTO: 28.1 PG (ref 26.5–33)
MCHC RBC AUTO-ENTMCNC: 30 G/DL (ref 31.5–36.5)
MCV RBC AUTO: 94 FL (ref 78–100)
MONOCYTES # BLD AUTO: 0.4 10E9/L (ref 0–1.3)
MONOCYTES NFR BLD AUTO: 5.4 %
MUCOUS THREADS #/AREA URNS LPF: PRESENT /LPF
NEUTROPHILS # BLD AUTO: 5.2 10E9/L (ref 1.6–8.3)
NEUTROPHILS NFR BLD AUTO: 66.6 %
NITRATE UR QL: NEGATIVE
NRBC # BLD AUTO: 0 10*3/UL
NRBC BLD AUTO-RTO: 0 /100
PH UR STRIP: 5.5 PH (ref 5–7)
PLATELET # BLD AUTO: 415 10E9/L (ref 150–450)
POTASSIUM SERPL-SCNC: 3.7 MMOL/L (ref 3.4–5.3)
PROT SERPL-MCNC: 8 G/DL (ref 6.8–8.8)
RBC # BLD AUTO: 4.42 10E12/L (ref 3.8–5.2)
RBC #/AREA URNS AUTO: 0 /HPF (ref 0–2)
SODIUM SERPL-SCNC: 139 MMOL/L (ref 133–144)
SOURCE: ABNORMAL
SP GR UR STRIP: 1.02 (ref 1–1.03)
SQUAMOUS #/AREA URNS AUTO: 5 /HPF (ref 0–1)
T4 FREE SERPL-MCNC: 0.89 NG/DL (ref 0.76–1.46)
TRANS CELLS #/AREA URNS HPF: <1 /HPF (ref 0–1)
TSH SERPL DL<=0.005 MIU/L-ACNC: 4.06 MU/L (ref 0.4–4)
URATE CRY #/AREA URNS HPF: ABNORMAL /HPF
UROBILINOGEN UR STRIP-MCNC: NORMAL MG/DL (ref 0–2)
WBC # BLD AUTO: 7.8 10E9/L (ref 4–11)
WBC #/AREA URNS AUTO: 3 /HPF (ref 0–5)

## 2019-04-09 PROCEDURE — 81001 URINALYSIS AUTO W/SCOPE: CPT | Performed by: EMERGENCY MEDICINE

## 2019-04-09 PROCEDURE — 25000125 ZZHC RX 250: Performed by: EMERGENCY MEDICINE

## 2019-04-09 PROCEDURE — 25000128 H RX IP 250 OP 636: Performed by: EMERGENCY MEDICINE

## 2019-04-09 PROCEDURE — 80053 COMPREHEN METABOLIC PANEL: CPT | Performed by: EMERGENCY MEDICINE

## 2019-04-09 PROCEDURE — 84439 ASSAY OF FREE THYROXINE: CPT | Performed by: EMERGENCY MEDICINE

## 2019-04-09 PROCEDURE — 99284 EMERGENCY DEPT VISIT MOD MDM: CPT | Mod: Z6 | Performed by: EMERGENCY MEDICINE

## 2019-04-09 PROCEDURE — 83690 ASSAY OF LIPASE: CPT | Performed by: EMERGENCY MEDICINE

## 2019-04-09 PROCEDURE — 96375 TX/PRO/DX INJ NEW DRUG ADDON: CPT

## 2019-04-09 PROCEDURE — 85025 COMPLETE CBC W/AUTO DIFF WBC: CPT | Performed by: EMERGENCY MEDICINE

## 2019-04-09 PROCEDURE — 99284 EMERGENCY DEPT VISIT MOD MDM: CPT | Mod: 25

## 2019-04-09 PROCEDURE — 25000132 ZZH RX MED GY IP 250 OP 250 PS 637: Performed by: EMERGENCY MEDICINE

## 2019-04-09 PROCEDURE — 82024 ASSAY OF ACTH: CPT | Performed by: EMERGENCY MEDICINE

## 2019-04-09 PROCEDURE — 82533 TOTAL CORTISOL: CPT | Performed by: EMERGENCY MEDICINE

## 2019-04-09 PROCEDURE — 84443 ASSAY THYROID STIM HORMONE: CPT | Performed by: EMERGENCY MEDICINE

## 2019-04-09 PROCEDURE — 36415 COLL VENOUS BLD VENIPUNCTURE: CPT | Performed by: EMERGENCY MEDICINE

## 2019-04-09 PROCEDURE — 96374 THER/PROPH/DIAG INJ IV PUSH: CPT

## 2019-04-09 RX ORDER — PREDNISONE 5 MG/1
5 TABLET ORAL DAILY
Qty: 14 TABLET | Refills: 0 | Status: SHIPPED | OUTPATIENT
Start: 2019-04-09 | End: 2019-04-15

## 2019-04-09 RX ORDER — OXYMETAZOLINE HYDROCHLORIDE 0.05 G/100ML
2 SPRAY NASAL 2 TIMES DAILY
Status: DISCONTINUED | OUTPATIENT
Start: 2019-04-09 | End: 2019-04-09 | Stop reason: HOSPADM

## 2019-04-09 RX ORDER — OXYCODONE HYDROCHLORIDE 5 MG/1
5 TABLET ORAL ONCE
Status: COMPLETED | OUTPATIENT
Start: 2019-04-09 | End: 2019-04-09

## 2019-04-09 RX ORDER — COSYNTROPIN 0.25 MG/ML
250 INJECTION, POWDER, FOR SOLUTION INTRAMUSCULAR; INTRAVENOUS ONCE
Status: COMPLETED | OUTPATIENT
Start: 2019-04-09 | End: 2019-04-09

## 2019-04-09 RX ORDER — ONDANSETRON 2 MG/ML
4 INJECTION INTRAMUSCULAR; INTRAVENOUS ONCE
Status: COMPLETED | OUTPATIENT
Start: 2019-04-09 | End: 2019-04-09

## 2019-04-09 RX ORDER — ACETAMINOPHEN 325 MG/1
650 TABLET ORAL ONCE
Status: COMPLETED | OUTPATIENT
Start: 2019-04-09 | End: 2019-04-09

## 2019-04-09 RX ORDER — ONDANSETRON 4 MG/1
4 TABLET, FILM COATED ORAL EVERY 8 HOURS PRN
Qty: 6 TABLET | Refills: 0 | Status: SHIPPED | OUTPATIENT
Start: 2019-04-09

## 2019-04-09 RX ADMIN — ACETAMINOPHEN 650 MG: 325 TABLET, FILM COATED ORAL at 17:56

## 2019-04-09 RX ADMIN — ONDANSETRON 4 MG: 2 INJECTION INTRAMUSCULAR; INTRAVENOUS at 17:22

## 2019-04-09 RX ADMIN — COSYNTROPIN 250 MCG: 0.25 INJECTION, POWDER, LYOPHILIZED, FOR SOLUTION INTRAMUSCULAR; INTRAVENOUS at 17:07

## 2019-04-09 RX ADMIN — OXYMETAZOLINE HYDROCHLORIDE 2 SPRAY: 5 SPRAY NASAL at 20:04

## 2019-04-09 RX ADMIN — OXYCODONE HYDROCHLORIDE 5 MG: 5 TABLET ORAL at 19:16

## 2019-04-09 RX ADMIN — HYDROCORTISONE SODIUM SUCCINATE 100 MG: 100 INJECTION, POWDER, FOR SOLUTION INTRAMUSCULAR; INTRAVENOUS at 19:17

## 2019-04-09 ASSESSMENT — MIFFLIN-ST. JEOR: SCORE: 1430.5

## 2019-04-09 NOTE — ED TRIAGE NOTES
Pt arrived in triage after coming from Endocrine clinic. HX of cushings, N/V/ abdominal pain post op. R/O infections. Clinic requested:  stim test if able, cortisol and ACTH. Work up for post op infection and treat with stress dose steroids. Citizen of [place] speaking. Family with patient.

## 2019-04-09 NOTE — NURSING NOTE
S-(situation):Here for f/u in Endocrine clinic  post  Pituitary surgery  B-(background): Pituitary  Surgery  3/25/19   DX Pituitary Adenoma  D35.2   A-(assessment): Patient arrived  vomiting  with abdominal pain.Unable to get  vitals due to  this.     R-(recommendations): Question  If the abdominal pain  Is adrenal related  so 911 was called for  transport . Patient  exteremly anxious  Dr James  Called the ER  [place]  and family  will be going to the ER to   be with her.

## 2019-04-09 NOTE — LETTER
4/9/2019       RE: Francis Flores  [address]  [facility] [address]     Dear Colleague,    Thank you for referring your patient, Francis Flores, to the [facility] ENDOCRINOLOG[facility]. Please see a copy of my visit note below.    Endocrinology and Diabetes Outpatient Clinic    CC: F/u visit for pituitary macroadenoma s/p surgery.     HPI: Mrs. Flores is a 54 year old Nigerian woman that is seen today for follow-up of a ACTH secreting pituitary macroadenoma. Ms. Flores  had a endoscopic Stealth-guided transsphenoidal resection of pituitary tumor on 3/25/2019.    The interview was facilitated by an  and two family members are also present during this visit.  Briefly, Mrs. Flores  had a pituitary macroadenoma diagnosed in 2008. Patient has had multiple evaluations over the years and was lost to follow-up for a period of time.  She came back to our clinic in 2018, after been evaluated at [facility]. Please see my prior notes for further detail on her prior history.  As patient had confirmed excess ACTH production and a pituitary mass, she was seen by neurosurgery and had a endoscopic Stealth-guided transsphenoidal resection of pituitary tumor on 3/25/2019.  Surgery was not considered curative and she was discharged home with no steroid replacement therapy.  Labs from 4/1/2019 were okay.  Today she presents to clinic reporting nausea and vomiting that started last night, accompanied by abdominal pain.  No fever, no diarrhea, no dysuria, but feels she is not completely emptying her bladder when she urinates. She also reports headache, on the frontal area, and blood discharge from her nose.  No clear liquid coming out from her nose.     ROS:  11 point ROS as detailed in the HPI above or negative    Medications  Current Outpatient Medications   Medication     calcium carbonate (OS- MG [place]. [place]) 500 tablet     ibuprofen 200 MG capsule      ondansetron (ZOFRAN-ODT) 4 MG ODT tab     pantoprazole sodium 40 MG tablet     polyethylene glycol (MIRALAX/GLYCOLAX) packet     ranitidine (ZANTAC) 150 MG tablet     senna-docusate (SENOKOT-S/PERICOLACE) 8.6-50 MG tablet     sodium chloride (OCEAN) 0.65 % nasal spray     VITAMIN D, CHOLECALCIFEROL, PO     No current facility-administered medications for this visit.      Family Hx   No Family Hx of pituitary disease, asthma or osteoporosis.   Has 4 healthy children, 25 yrs old girl, 20 yrs old twin boys and 17 yrs old boy.     Social Hx   Behavioral history: No tobacco use.   Home environment: No secondhand tobacco smoke in home.   Mrs. Flores does not work.   She lives with her  and their 4 children. Mrs. Flores lives in [place].  She does not smoke or consume alcohol.     PMH   Illnesses:   Non functioning (?) pituitary tumor as per HPI   Early ovarian failure/ incorrect age?  Osteoporosis/previous bone fracture     Surgeries:   Right knee (11/2008)     Menstrual History:   Menarche at age 13   Cycles were regular   Gestae 8 Para 4 (one gemellar pregnancy)   Menopause 14 yrs ago.     Physical Exam:  General : Alert, oriented X4, mildly distressed, no cyanosis.   Chest: clear to auscultation  Hearth: RRR  Abd: Pain on palpation of lower abdomen  Psychological: appropriate mood and affect      Lab results    Labs reviewed      Assessment and Plan:  Mrs. 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   1. Nausea/vomiting/abdominal pain:  This might be related to an UTI, or an infection of another site or be adrenal insufficiency presenting as adrenal crisis.  I contacted the ER and the patient will be seen there for additional work.  ACTH stim test to be done if possible, otherwise ACTH and cortisol levels to be drawn as soon as she arrives at the ER and administer a steroid stress dose afterwards, while work-up for infection is in process.  I will  try to reach Dr. Crain as well.     Maine James MD PhD    Division of Endocrinology and Diabetes

## 2019-04-09 NOTE — ED PROVIDER NOTES
History     Chief Complaint   Patient presents with     Abdominal Pain     The history is provided by the patient. The history is limited by a language barrier. A  was used.     Francis Flores is a 54 year old female with a history of macroadenoma s/p endoscopic stealth-guided transsphenoidal resection of pituitary tumor (3/25/2019), GERD and osteoporosis who presents from clinic to the Emergency Department with complaints of nausea, vomiting and diffuse abdominal cramping. She also reports  liver pain . Onset this morning, 8am, gradually worsening throughout the day. She also reports some chest pain and pressure stating that it also feels inflamed. She also reports lower extremity swelling since the surgery (3/25/19). The patient notes she has pain on the left side of her face and reports that her left nostril is inflamed. She also had a nose bleed this morning. She reports arthralgias all over, and reports a tightness under her right shoulder. She reports not completely emptying her bladder, and denies dysuria. She denies fever, shortness of breath, and denies a sweet taste in her mouth. She denies any vision changes. She has not eaten today.     I have reviewed the Medications, AllergiesPast Medical and Surgical History, and Social History in the Epic system .  Past Medical History:   Diagnosis Date     GERD (gastroesophageal reflux disease)      Osteoporosis      Pituitary adenoma (H) 4/13/2012       Past Surgical History:   Procedure Laterality Date     CYSTOSCOPY BLADDER WITH STONE EXTRACTION Right      IR CAROTID CEREBRAL ANGIOGRAM BILATERAL  11/16/2018     OPTICAL TRACKING SYSTEM ENDOSCOPIC RESECTION TUMOR CRANIAL N/A 3/25/2019    Procedure: Stealth Assisted Endoscopic Transnasal Resection Of Pituitary Tumor;  Surgeon: Derrell Crain MD;  Location: [facility] OR     ORTHOPEDIC SURGERY      knee       Family History   Problem Relation Age of Onset     No Known Problems Mother   "    No Known Problems Father          of old age     Glaucoma No family hx of      Macular Degeneration No family hx of        Social History     Tobacco Use     Smoking status: Never Smoker     Smokeless tobacco: Never Used   Substance Use Topics     Alcohol use: No     Frequency: Never       Current Facility-Administered Medications   Medication     ondansetron (ZOFRAN) injection 4 mg     Current Outpatient Medications   Medication     calcium carbonate (OS- MG [place]. [place]) 500 tablet     ibuprofen 200 MG capsule     ondansetron (ZOFRAN-ODT) 4 MG ODT tab     pantoprazole sodium 40 MG tablet     polyethylene glycol (MIRALAX/GLYCOLAX) packet     ranitidine (ZANTAC) 150 MG tablet     senna-docusate (SENOKOT-S/PERICOLACE) 8.6-50 MG tablet     sodium chloride (OCEAN) 0.65 % nasal spray     VITAMIN D, CHOLECALCIFEROL, PO      No Known Allergies    Review of Systems    Physical Exam   BP: 127/80  Pulse: 75  Temp: 98.6  F (37  C)  Resp: 18  Height: 162.6 cm (5' 4\")  Weight: 84.6 kg (186 lb 6.4 oz)  SpO2: 96 %      Physical Exam  Gen: Alert, oriented. Non-toxic appearing,  HEENT: Normocephalic. Conjunctivae without injection. No scleral icterus. No rhinorrhea appreciated. No clear fluid.   CV: RRR, no murmur appreciated  Peripheral vascular: No significant LE edema. Warm extremities.   Respiratory: Non-labored breathing on RA. No wheezing or stridor appreciated.   Abdomen/GI: Soft, diffusely tender with no focal area of discomfort. Non-distended. No guarding or rebound tenderness appreciated.   : No CVA tenderness.   MSK: No gross bony deformity.   Heme/lymph: No ecchymoses noted.   Neuro: Alert, oriented. CN 2-12 grossly intact.   Psych: No delusions or agitation.     ED Course        Procedures         Labs Ordered and Resulted from Time of ED Arrival Up to the Time of Departure from the ED   TSH WITH FREE T4 REFLEX - Abnormal; Notable for the following components:       Result Value    TSH 4.06 (*)     All " other components within normal limits   CBC WITH PLATELETS DIFFERENTIAL - Abnormal; Notable for the following components:    MCHC 30.0 (*)     All other components within normal limits   COMPREHENSIVE METABOLIC PANEL - Abnormal; Notable for the following components:    Urea Nitrogen 6 (*)     Albumin 3.0 (*)     All other components within normal limits   ROUTINE UA WITH MICROSCOPIC REFLEX TO CULTURE - Abnormal; Notable for the following components:    Leukocyte Esterase Urine Small (*)     Bacteria Urine Few (*)     Squamous Epithelial /HPF Urine 5 (*)     Mucous Urine Present (*)     Uric Acid Crystals Moderate (*)     All other components within normal limits   COSYNTROPIN STIMULATION STUDY POST 60 - Abnormal; Notable for the following components:    Cortisol Stimulation Post 60 20.0 (*)     All other components within normal limits   LIPASE   RUPALI STIM BASELINE   T4 FREE   T4 FREE         Assessments & Plan (with Medical Decision Making)   Francis Flores is a 54 year old F  with medical history significant for Cushing's disease s/p macroadenoma resection presenting for evaluation of nausea, vomiting and abdominal discomfort. Differential considered included gastroenteritis, metabolic abnormality, cystitis, pyelonephritis, adrenal crisis, among others. I reviewed nursing notes and patient's vitals on arrival. Examination was significant for non toxic appearing F with diffuse abdominal discomfort, no peritoneal signs.     IV access established and labs were sent. These notable for normal CBC and CMP. Corticotropin stimulation test completed and showed robust response from 6 to 20 (even after redraw was over 1.5 hours from initial med administration.     I spoke with the endocrinology fellow and reviewed patient's stimulation test results. Patient given zofran, IVF, and stress does steroids. Patient reported feeling markedly improved with no abdominal pain and minimal nausea on repeat evaluation. Given her marked  improvement and after conversation with endocrine fellow about reviewing results tomorrow with endocrine staff, I felt comfortable discharging patient with close follow up. He recommended starting 5mg prednisone daily for now. She was discharged also with zofran.        Medication List      Started    ondansetron 4 MG tablet  Commonly known as:  ZOFRAN  4 mg, Oral, EVERY 8 HOURS PRN     predniSONE 5 MG tablet  Commonly known as:  DELTASONE  5 mg, Oral, DAILY        ASK your doctor about these medications    traMADol 50 MG tablet  Commonly known as:  ULTRAM  50 mg, Oral, EVERY 6 HOURS PRN  Ask about: Should I take this medication?            Final diagnoses:   Abdominal pain, generalized     I, Kajal Hernandes, am serving as a trained medical scribe to document services personally performed by Bj Sanders MD, based on the provider's statements to me.   IjB MD, was physically present and have reviewed and verified the accuracy of this note documented by Kajal Hernandes.   4/9/2019   [facility], [place], EMERGENCY DEPARTMENT     Bj Sanders MD  04/10/19 0935

## 2019-04-09 NOTE — PROGRESS NOTES
Endocrinology and Diabetes Outpatient Clinic    CC: F/u visit for pituitary macroadenoma s/p surgery.     HPI: Mrs. Flores is a 54 year old [place]ian woman that is seen today for follow-up of a ACTH secreting pituitary macroadenoma. Ms. Flores  had a endoscopic Stealth-guided transsphenoidal resection of pituitary tumor on 3/25/2019.    The interview was facilitated by an  and two family members are also present during this visit.  Briefly, Mrs. Flores  had a pituitary macroadenoma diagnosed in 2008. Patient has had multiple evaluations over the years and was lost to follow-up for a period of time.  She came back to our clinic in 2018, after been evaluated at [facility]. Please see my prior notes for further detail on her prior history.  As patient had confirmed excess ACTH production and a pituitary mass, she was seen by neurosurgery and had a endoscopic Stealth-guided transsphenoidal resection of pituitary tumor on 3/25/2019.  Surgery was not considered curative and she was discharged home with no steroid replacement therapy.  Labs from 4/1/2019 were okay.  Today she presents to clinic reporting nausea and vomiting that started last night, accompanied by abdominal pain.  No fever, no diarrhea, no dysuria, but feels she is not completely emptying her bladder when she urinates. She also reports headache, on the frontal area, and blood discharge from her nose.  No clear liquid coming out from her nose.     ROS:  11 point ROS as detailed in the HPI above or negative    Medications  Current Outpatient Medications   Medication     calcium carbonate (OS- MG [place]. [place]) 500 tablet     ibuprofen 200 MG capsule     ondansetron (ZOFRAN-ODT) 4 MG ODT tab     pantoprazole sodium 40 MG tablet     polyethylene glycol (MIRALAX/GLYCOLAX) packet     ranitidine (ZANTAC) 150 MG tablet     senna-docusate (SENOKOT-S/PERICOLACE) 8.6-50 MG tablet     sodium chloride (OCEAN) 0.65 % nasal spray     VITAMIN D,  CHOLECALCIFEROL, PO     No current facility-administered medications for this visit.      Family Hx   No Family Hx of pituitary disease, asthma or osteoporosis.   Has 4 healthy children, 25 yrs old girl, 20 yrs old twin boys and 17 yrs old boy.     Social Hx   Behavioral history: No tobacco use.   Home environment: No secondhand tobacco smoke in home.   Mrs. Flores does not work.   She lives with her  and their 4 children. Mrs. Flores lives in [place].  She does not smoke or consume alcohol.     PMH   Illnesses:   Non functioning (?) pituitary tumor as per HPI   Early ovarian failure/ incorrect age?  Osteoporosis/previous bone fracture     Surgeries:   Right knee (11/2008)     Menstrual History:   Menarche at age 13   Cycles were regular   Gestae 8 Para 4 (one gemellar pregnancy)   Menopause 14 yrs ago.     Physical Exam:  General : Alert, oriented X4, mildly distressed, no cyanosis.   Chest: clear to auscultation  Hearth: RRR  Abd: Pain on palpation of lower abdomen  Psychological: appropriate mood and affect      Lab results    Labs reviewed      Assessment and Plan:  Mrs. 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   1. Nausea/vomiting/abdominal pain:  This might be related to an UTI, or an infection of another site or be adrenal insufficiency presenting as adrenal crisis.  I contacted the ER and the patient will be seen there for additional work.  ACTH stim test to be done if possible, otherwise ACTH and cortisol levels to be drawn as soon as she arrives at the ER and administer a steroid stress dose afterwards, while work-up for infection is in process.  I will try to reach Dr. Crain as well.     Maine James MD PhD    Division of Endocrinology and Diabetes

## 2019-04-09 NOTE — ED AVS SNAPSHOT
[facility], [place], Emergency Department  [address] [place] [address]  Phone:  [phone]                                    Francis Flores   MRN: [id number]    Department:  [facility], Emergency Department   Date of Visit:  4/9/2019           After Visit Summary Signature Page    I have received my discharge instructions, and my questions have been answered. I have discussed any challenges I see with this plan with the nurse or doctor.    ..........................................................................................................................................  Patient/Patient Representative Signature      ..........................................................................................................................................  Patient Representative Print Name and Relationship to Patient    ..................................................               ................................................  Date                                   Time    ..........................................................................................................................................  Reviewed by Signature/Title    ...................................................              ..............................................  Date                                               Time          22EPIC Rev 08/18

## 2019-04-10 ENCOUNTER — TELEPHONE (OUTPATIENT)
Dept: ENDOCRINOLOGY | Facility: CLINIC | Age: 54
End: 2019-04-10

## 2019-04-10 LAB — ACTH PLAS-MCNC: 104 PG/ML

## 2019-04-10 NOTE — TELEPHONE ENCOUNTER
She is doing fine feels great no more nausea or vomiting  and keeping her oral prednisone down. She did ask  about not blowing her nose  and what time frame She was told to not do it. Her f/u  with ENT is  4/17/19 so I instructed not to do it  until and then ask Dr Jeong about it  at the visit. Verbal understanding   Given with help of .

## 2019-04-10 NOTE — TELEPHONE ENCOUNTER
Contacted on 4/9/19 re: patient with potential adrenal insufficiency s/p pituitary surgery for Cushing's disease. Unlikely given partial surgery and labs on 4/1/19 not indicative of insufficiency. However, patient's symptoms concerning for insufficiency vs infection (UTI) or both. CBC and BMP ok. TSH/free T4 WNL. UA with small LE and few bacteria but no culture? Despite ACTH stimulation to 20 from 6, there is a chance that she has central adrenal insufficiency and that her adrenals are still capable of responding to ACTH. Hence, I recommended she start prednisone 5 mg daily.    Discussed case with Dr James 4/10/19. Will work with nurse triage to contact the patient and check in. May need higher doses of steroids for the next few days and/or higher maintenance dose until follow-up. If unable to keep pills down, will need to go emergently to the ED for IV steroids.     Rui Beyer MD   Endocrinology Fellow  Pager: [phone]

## 2019-04-10 NOTE — TELEPHONE ENCOUNTER
----- Message from SHAYY James MD sent at 4/10/2019 10:32 AM CDT -----  She was discharged from the ER on oral steroids.  Please make sure her nausea and vomiting have resolved and that she has been able to take the prednisone orally.  You will need an . She looks a few hours away.  If she is not well, she may need to receive IV steroids and hydration.  Feel free to call my cell [phone].  Thanks,  Maine James MD PhD    Division of Endocrinology and Diabetes

## 2019-04-12 ENCOUNTER — TELEPHONE (OUTPATIENT)
Dept: ENDOCRINOLOGY | Facility: CLINIC | Age: 54
End: 2019-04-12

## 2019-04-12 NOTE — TELEPHONE ENCOUNTER
----- Message from SHAYY James MD sent at 4/12/2019 12:14 PM CDT -----  Hi Patti  Please call Ms. Flores and check on how she is doing.  If she is doing okay, my plan is for her to STOP the prednisone on Monday, as the tests she did in the ER do not suggest adrenal insufficiency.  If she is doing well, please make a chart note and pass these recommendations to her.  You will need to call with an .    Thanks,  Maine James MD PhD    Division of Endocrinology and Diabetes

## 2019-04-15 ENCOUNTER — TELEPHONE (OUTPATIENT)
Dept: ENDOCRINOLOGY | Facility: CLINIC | Age: 54
End: 2019-04-15

## 2019-04-15 NOTE — TELEPHONE ENCOUNTER
Per Dr James  Prednisone 5 mg was stopped not needed. I was able to reach  Francis today and she gave verbal  Feedback  per  to stop the medication.

## 2019-04-16 ENCOUNTER — TELEPHONE (OUTPATIENT)
Dept: ENDOCRINOLOGY | Facility: CLINIC | Age: 54
End: 2019-04-16

## 2019-04-16 DIAGNOSIS — Z98.890 HISTORY OF PITUITARY SURGERY: Primary | ICD-10-CM

## 2019-04-16 DIAGNOSIS — Z98.890 HISTORY OF PITUITARY SURGERY: ICD-10-CM

## 2019-04-16 RX ORDER — PREDNISONE 1 MG/1
TABLET ORAL
Qty: 200 TABLET | Refills: 0 | Status: SHIPPED | OUTPATIENT
Start: 2019-04-16 | End: 2019-04-16

## 2019-04-16 RX ORDER — PREDNISONE 1 MG/1
TABLET ORAL
Qty: 200 TABLET | Refills: 0 | Status: SHIPPED | OUTPATIENT
Start: 2019-04-16 | End: 2019-05-30

## 2019-04-17 ENCOUNTER — OFFICE VISIT (OUTPATIENT)
Dept: OTOLARYNGOLOGY | Facility: CLINIC | Age: 54
End: 2019-04-17
Payer: COMMERCIAL

## 2019-04-17 VITALS
SYSTOLIC BLOOD PRESSURE: 122 MMHG | HEART RATE: 77 BPM | HEIGHT: 62 IN | TEMPERATURE: 98.6 F | DIASTOLIC BLOOD PRESSURE: 85 MMHG | WEIGHT: 182 LBS | BODY MASS INDEX: 33.49 KG/M2

## 2019-04-17 DIAGNOSIS — E24.0 CUSHING'S DISEASE (H): ICD-10-CM

## 2019-04-17 DIAGNOSIS — D35.2 PITUITARY ADENOMA (H): Primary | ICD-10-CM

## 2019-04-17 ASSESSMENT — MIFFLIN-ST. JEOR: SCORE: 1382.05

## 2019-04-17 NOTE — PATIENT INSTRUCTIONS
Thank You for choosing [facility]. You were seen in the ENT  Clinic today by Dr.Boyer Gramajo would like you to follow up in approximately 2 months      If you have any questions or concerns after your appointment, please  Call [phone].

## 2019-04-17 NOTE — LETTER
4/17/2019       RE: Francis Flores  [address]  [facility] [id number]     Dear Colleague,    Thank you for referring your patient, Francis Flores, to the [facility] EAR NOSE AND THROAT at [facility]. Please see a copy of my visit note below.    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 Crain. She was seen in ED for nausea and vomiting. That has resolved. She is concerned that she is having fever - temp today is normal. No abnormal nasal drainage or concerning neurologic symptoms. Continues to have some of symptoms related to Cushings.       Patient Supplied Answers to Review of Systems  [facility] ENT ROS 4/17/2019   Constitutional Appetite change, Unexplained fatigue   Neurology Dizzy spells, Numbness, Unexplained weakness, Headache   Eyes Visual loss, Double vision   Ears, Nose, Throat -   Cardiopulmonary Wheezing   Gastrointestinal/Genitourinary Heartburn/indigestion   Musculoskeletal Sore or stiff joints, Back pain, Neck pain, Swollen legs/feet     Exam: alert, cooperative, looks well.   Blood pressure 122/85, pulse 77, temperature 98.6  F (37  C).  Anterior rhinoscopy shows bilateral healthy mucosa, no crusting. Nasal endoscopy not performed.   OP/OC:No abnormal post nasal drainage.     A/P: Healing appropriately. Should continue to follow with endocrine I will see her in June or sooner for nasal issues.          Again, thank you for allowing me to participate in the care of your patient.      Sincerely,    Leidy Jeong MD

## 2019-04-17 NOTE — PROGRESS NOTES
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 Crain. She was seen in ED for nausea and vomiting. That has resolved. She is concerned that she is having fever - temp today is normal. No abnormal nasal drainage or concerning neurologic symptoms. Continues to have some of symptoms related to Cushings.       Patient Supplied Answers to Review of Systems  [facility] ENT ROS 4/17/2019   Constitutional Appetite change, Unexplained fatigue   Neurology Dizzy spells, Numbness, Unexplained weakness, Headache   Eyes Visual loss, Double vision   Ears, Nose, Throat -   Cardiopulmonary Wheezing   Gastrointestinal/Genitourinary Heartburn/indigestion   Musculoskeletal Sore or stiff joints, Back pain, Neck pain, Swollen legs/feet     Exam: alert, cooperative, looks well.   Blood pressure 122/85, pulse 77, temperature 98.6  F (37  C).  Anterior rhinoscopy shows bilateral healthy mucosa, no crusting. Nasal endoscopy not performed.   OP/OC:No abnormal post nasal drainage.     A/P: Healing appropriately. Should continue to follow with endocrine I will see her in June or sooner for nasal issues.

## 2019-04-17 NOTE — NURSING NOTE
Chief Complaint   Patient presents with     RECHECK     post op      Blood pressure 122/85, pulse 77, temperature 98.6  F (37  C).    Kody Urban LPN

## 2019-05-09 ENCOUNTER — NURSE TRIAGE (OUTPATIENT)
Dept: INTERNAL MEDICINE | Facility: CLINIC | Age: 54
End: 2019-05-09

## 2019-05-09 NOTE — TELEPHONE ENCOUNTER
[facility]: Nurse Triage Note  SITUATION/BACKGROUND                                                      Francis Flores is a 54 year old female who's daughter, Slim, calls with concerns and symptoms related to recent surgery with Ian Jeong and Breann on 3/25/2019: Endoscopic Stealth-guided transsphenoidal resection of pituitary tumor/ Cushings disease.  Her mom is reporting nasal pain without drainage or bleeding and headache x 2 weeks at 3-4/10.Afebrile.  LVD Dr Jeong on 4/17/2019, healthy mucosa, no crusting.  She has seen PCP Dr Dewey recently and by report has a low vitamin D level and other abnormal labs.  Meningeal signs of fever, vomiting, severe headache,achiness, pale mottled skin, rash, neck stiffness, dislike of bright lights and confusion/change in LOC /seizure reviewed and are negative.  Slim is requesting guidance and [facility] between [facility] and PCP Dr Dewey regarding abnormal lab work and plan of care, and headaches.  Follow up :Dr James and Dr Weir followed in Endocrine, no return appts in place.Scheduling has been notified to contact the pt for appointment.  Dr Jeong appt 6/12/2019  MRA/I 6/29/19 for tumor follow up  Pred taper as below via Dr James  predniSONE (DELTASONE) 1 MG tablet 200 tablet 0 4/16/2019  No   Sig: Take as directed..   Sent to pharmacy as: predniSONE (DELTASONE) 1 MG tablet   Class: E-Prescribe   Notes to Pharmacy: Take  4 tablets daily X 2 weeks , then 3 tablets daily X 2 weeks,  then 2 tablets daily X 2 weeks  then 1 tablets daily X 2 weeks then stop.                     ASSESSMENT       Patient family with questions and concerns regarding postoperative pituitary adenoma 3/25/19 with symptoms of  headache 4/10, nasal pain and abnormal labs done in [place] as reported by daughter( not seen in Care everywhere)  Requesting contact by [facility] team with PCP and daughter Slim regarding plan of care and symptom  management.  Message to Yakov Jeong, Breann casanova and Endocrine/Dr James.  Scheduling has been notified to contact the pt for Endocrine appointment.      RECOMMENDATION/PLAN                                                      RECOMMENDED DISPOSITION:  See within 2 weeks - Endocrine and ENT/Neurosurgery as indicated by service.  Will comply with recommendation: Yes    If further questions/concerns or if symptoms do not improve, worsen or new symptoms develop, call your PCP or [phone] to talk with the Resident on call, as soon as possible.    Guideline used: postoperative concerns.  Telephone Triage Protocols for Nurses, Fifth Edition, Alma Collins RN

## 2019-05-10 ENCOUNTER — PATIENT OUTREACH (OUTPATIENT)
Dept: NEUROSURGERY | Facility: CLINIC | Age: 54
End: 2019-05-10

## 2019-05-10 NOTE — TELEPHONE ENCOUNTER
Mayank in Neurosurgery will look at this and contact ENT  Due to  Symptoms related to surgery . Patti Mcneil RN on 5/10/2019 at 11:06 AM

## 2019-05-10 NOTE — PROGRESS NOTES
Returned call.    Writer called PCP at the request of the pt's daughter.  MD said the pt came in with right nares pain.  Both nasal passages are patent, VSS, and afebrile.  PCP prescribed Flonase.    Writer attempt to reach the pt.  No one answered and there was not a voice mail.

## 2019-05-15 ENCOUNTER — TELEPHONE (OUTPATIENT)
Dept: NEUROSURGERY | Facility: CLINIC | Age: 54
End: 2019-05-15

## 2019-05-15 NOTE — TELEPHONE ENCOUNTER
"3/25 surgery for Cushings disease, transnasal endoscopic pituitary surgery in combination with Ian Crain and Jean-Paul  LOV 4/17/19 with / Jean-Paul.     No fever, AVS:  F/U in 2 months.  LOV Endocrine 4/9/19    Patient stating she just does not feel well.  Last appointment with Endocrine 4/9/19,  Pt asking if her \"levels\" are ok and if she is  \" insufficient\" or not.  Patient asking to be seen sooner than appointment on 6/12/19 with Dr Jeong.  Pt stating she is having nasal pain about 3xper day and has nausea after drinking water.   Dr Crain recommended appointment with Endocrine,  Note to Endocrine Pool to call and schedule patient.  Pt daughter Bety talked with me on the phone.     Voices understanding, note sent to Endocrine for a future appointment.                                "

## 2019-05-15 NOTE — TELEPHONE ENCOUNTER
"Patient's Daughter Slim called     DOS 03/25/2019 Tumor resection   Pt has went PCP care giver x3 since surgery. PCP gave spray for her nose; daughter unable to provide information about spray.   Fever for approx: 3w Patient's Daughter Slim called     Pain level 8 on a 0-10 pain scale \"described a shooting pain that goes inside her nose to her brain.\"     Patient went to ED 05/14/2019 in [place]; did not see a Dr due to her pain the wait time.   Daughter stated :\"2 weeks following surgery well, but nos is weak, pain, not eating, or sleeping, has has declined\"   Patient has  a June 20,2019 appointment and requests to been ASAP.     "

## 2019-05-29 ENCOUNTER — OFFICE VISIT (OUTPATIENT)
Dept: ENDOCRINOLOGY | Facility: CLINIC | Age: 54
End: 2019-05-29
Attending: NURSE PRACTITIONER
Payer: COMMERCIAL

## 2019-05-29 VITALS
SYSTOLIC BLOOD PRESSURE: 118 MMHG | HEART RATE: 80 BPM | BODY MASS INDEX: 33.69 KG/M2 | WEIGHT: 185.4 LBS | DIASTOLIC BLOOD PRESSURE: 80 MMHG

## 2019-05-29 DIAGNOSIS — E24.0 PITUITARY DEPENDENT CUSHING DISEASE (H): ICD-10-CM

## 2019-05-29 DIAGNOSIS — E03.8 SUBCLINICAL HYPOTHYROIDISM: ICD-10-CM

## 2019-05-29 DIAGNOSIS — E24.0 PITUITARY DEPENDENT CUSHING DISEASE (H): Primary | ICD-10-CM

## 2019-05-29 DIAGNOSIS — E27.0 HYPERCORTISOLEMIA (H): ICD-10-CM

## 2019-05-29 DIAGNOSIS — E24.0 CUSHING'S DISEASE (H): ICD-10-CM

## 2019-05-29 LAB
ANION GAP SERPL CALCULATED.3IONS-SCNC: 9 MMOL/L (ref 3–14)
BUN SERPL-MCNC: 8 MG/DL (ref 7–30)
CALCIUM SERPL-MCNC: 10.1 MG/DL (ref 8.5–10.1)
CHLORIDE SERPL-SCNC: 107 MMOL/L (ref 94–109)
CO2 SERPL-SCNC: 25 MMOL/L (ref 20–32)
CORTIS SERPL-MCNC: 3.1 UG/DL (ref 4–22)
CREAT SERPL-MCNC: 0.59 MG/DL (ref 0.52–1.04)
GFR SERPL CREATININE-BSD FRML MDRD: >90 ML/MIN/{1.73_M2}
GLUCOSE SERPL-MCNC: 100 MG/DL (ref 70–99)
POTASSIUM SERPL-SCNC: 3.8 MMOL/L (ref 3.4–5.3)
SODIUM SERPL-SCNC: 141 MMOL/L (ref 133–144)
T4 FREE SERPL-MCNC: 0.8 NG/DL (ref 0.76–1.46)
TSH SERPL DL<=0.005 MIU/L-ACNC: 4.4 MU/L (ref 0.4–4)

## 2019-05-29 ASSESSMENT — PAIN SCALES - GENERAL: PAINLEVEL: SEVERE PAIN (7)

## 2019-05-29 NOTE — LETTER
5/29/2019       RE: Francis Flores  [address]  [facility] [address]     Dear Colleague,    Thank you for referring your patient, Francis Flores, to the [facility] ENDOCRINOLOGY at [facility]. Please see a copy of my visit note below.    Endocrinology and Diabetes Outpatient Clinic    Interval History: TSS for Cushing disease was done on 3/25/2019 without complications. Post operative cortisol did not reached less than 2-3. Pathology compatible to ACTH stained adenoma with fibrous tissues.   Patient postoperatively visited several times to ED not feeling well, she felt better with steroid. She has been on prednisone taper and last dose prednisone 1 mg was done previous day. Today she did not take.   After surgery, in general she feels better above her head, inculding HA much better, she has had pain in her nose on the right side but feeling better recently. Today her main concern is multiple muscle/joint pain. She described pain below her chest.    Appetite: fair, She gained 2-3 lb. BW: regular, sleep: ok    HPI: Mrs. 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 Ms. Flores  had a endoscopic Stealth-guided transsphenoidal resection of pituitary tumor on 3/25/2019.    The interview was facilitated by an  and two family members are also present during this visit.  Briefly, Mrs. Flores  had a pituitary macroadenoma diagnosed in 2008. Patient has had multiple evaluations over the years and was lost to follow-up for a period of time.  She came back to our clinic in 2018, after been evaluated at [facility]. Please see my prior notes for further detail on her prior history.  As patient had confirmed excess ACTH production and a pituitary mass, she was seen by neurosurgery and had a endoscopic Stealth-guided transsphenoidal resection of pituitary tumor on 3/25/2019.  Surgery was not  considered curative and she was discharged home with no steroid replacement therapy.  Labs from 4/1/2019 were okay.  Today she presents to clinic reporting nausea and vomiting that started last night, accompanied by abdominal pain.  No fever, no diarrhea, no dysuria, but feels she is not completely emptying her bladder when she urinates. She also reports headache, on the frontal area, and blood discharge from her nose.  No clear liquid coming out from her nose.     ROS:  11 point ROS as detailed in the HPI above or negative    Medications  Current Outpatient Medications   Medication     calcium carbonate (OS- MG [place]. [place]) 500 tablet     ibuprofen 200 MG capsule     ondansetron (ZOFRAN) 4 MG tablet     ondansetron (ZOFRAN-ODT) 4 MG ODT tab     pantoprazole sodium 40 MG tablet     polyethylene glycol (MIRALAX/GLYCOLAX) packet     predniSONE (DELTASONE) 1 MG tablet     ranitidine (ZANTAC) 150 MG tablet     senna-docusate (SENOKOT-S/PERICOLACE) 8.6-50 MG tablet     sodium chloride (OCEAN) 0.65 % nasal spray     VITAMIN D, CHOLECALCIFEROL, PO     No current facility-administered medications for this visit.      Family Hx   No Family Hx of pituitary disease, asthma or osteoporosis.   Has 4 healthy children, 25 yrs old girl, 20 yrs old twin boys and 17 yrs old boy.     Social Hx   Behavioral history: No tobacco use.   Home environment: No secondhand tobacco smoke in home.   Mrs. Flores does not work.   She lives with her  and their 4 children. Mrs. Flores lives in [place].  She does not smoke or consume alcohol.     PMH   Illnesses:   Non functioning (?) pituitary tumor as per HPI   Early ovarian failure/ incorrect age?  Osteoporosis/previous bone fracture     Surgeries:   Right knee (11/2008)   Menstrual History:   Menarche at age 13   Cycles were regular   Gestae 8 Para 4 (one gemellar pregnancy)   Menopause 14 yrs ago.     Physical Exam:  /80   Pulse 80   Wt 84.1 kg (185 lb 6.4 oz)   LMP  (LMP  Unknown)   BMI 33.69 kg/m     General : Alert, oriented X4, mildly distressed, no cyanosis.   Chest: clear to auscultation  Hearth: RRR  Abd: Pain on palpation of lower abdomen  Psychological: appropriate mood and affect      Lab results    Labs reviewed    Assessment and Plan:  Mrs. 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   Overall, she is feeling better, HA improved, able to taper prednisone, but today's main concern is multiple joint / muscle pain.   I am not sure this is from relative cortisol deficiency (she has still Cushing residual). ACTH preop 300-400 range, post op 100-110 range. Other possibility is GH deficiency. Or non pituitary related symptoms.    We will check following  - IGF1,   -TSH, free T4  -Cortisol, ACTH   -BMP   Addendum  She has subclinical hypothryodism, we will try levothyroxine 75 mcg. Random coritisol seems relatiiely low.    5/29/2019 12:09 5/29/2019 12:10   Sodium  141   Potassium  3.8   Chloride  107   Carbon Dioxide  25   Urea Nitrogen  8   Creatinine  0.59   GFR Estimate  >90   GFR Estimate If Black  >90   Calcium  10.1   Anion Gap  9   Adrenal Corticotropin  116 (H)   Cortisol Serum 3.1 (L)    T4 Free  0.80   TSH  4.40 (H)     Tete Weir MD  Staff Physician  Endocrinology and Metabolism  [facility]  License: [id number]  Pager: [phone]

## 2019-05-29 NOTE — PROGRESS NOTES
Endocrinology and Diabetes Outpatient Clinic    Interval History: TSS for Cushing disease was done on 3/25/2019 without complications. Post operative cortisol did not reached less than 2-3. Pathology compatible to ACTH stained adenoma with fibrous tissues.   Patient postoperatively visited several times to ED not feeling well, she felt better with steroid. She has been on prednisone taper and last dose prednisone 1 mg was done previous day. Today she did not take.   After surgery, in general she feels better above her head, inculding HA much better, she has had pain in her nose on the right side but feeling better recently. Today her main concern is multiple muscle/joint pain. She described pain below her chest.    Appetite: fair, She gained 2-3 lb. BW: regular, sleep: ok    HPI: Mrs. 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 Ms. Flores  had a endoscopic Stealth-guided transsphenoidal resection of pituitary tumor on 3/25/2019.    The interview was facilitated by an  and two family members are also present during this visit.  Briefly, Mrs. Flores  had a pituitary macroadenoma diagnosed in 2008. Patient has had multiple evaluations over the years and was lost to follow-up for a period of time.  She came back to our clinic in 2018, after been evaluated at [facility]. Please see my prior notes for further detail on her prior history.  As patient had confirmed excess ACTH production and a pituitary mass, she was seen by neurosurgery and had a endoscopic Stealth-guided transsphenoidal resection of pituitary tumor on 3/25/2019.  Surgery was not considered curative and she was discharged home with no steroid replacement therapy.  Labs from 4/1/2019 were okay.  Today she presents to clinic reporting nausea and vomiting that started last night, accompanied by abdominal pain.  No fever, no diarrhea, no dysuria, but feels she is not  completely emptying her bladder when she urinates. She also reports headache, on the frontal area, and blood discharge from her nose.  No clear liquid coming out from her nose.     ROS:  11 point ROS as detailed in the HPI above or negative    Medications  Current Outpatient Medications   Medication     calcium carbonate (OS- MG [place]. [place]) 500 tablet     ibuprofen 200 MG capsule     ondansetron (ZOFRAN) 4 MG tablet     ondansetron (ZOFRAN-ODT) 4 MG ODT tab     pantoprazole sodium 40 MG tablet     polyethylene glycol (MIRALAX/GLYCOLAX) packet     predniSONE (DELTASONE) 1 MG tablet     ranitidine (ZANTAC) 150 MG tablet     senna-docusate (SENOKOT-S/PERICOLACE) 8.6-50 MG tablet     sodium chloride (OCEAN) 0.65 % nasal spray     VITAMIN D, CHOLECALCIFEROL, PO     No current facility-administered medications for this visit.      Family Hx   No Family Hx of pituitary disease, asthma or osteoporosis.   Has 4 healthy children, 25 yrs old girl, 20 yrs old twin boys and 17 yrs old boy.     Social Hx   Behavioral history: No tobacco use.   Home environment: No secondhand tobacco smoke in home.   Mrs. Flores does not work.   She lives with her  and their 4 children. Mrs. Flores lives in [place].  She does not smoke or consume alcohol.     PMH   Illnesses:   Non functioning (?) pituitary tumor as per HPI   Early ovarian failure/ incorrect age?  Osteoporosis/previous bone fracture     Surgeries:   Right knee (11/2008)     Menstrual History:   Menarche at age 13   Cycles were regular   Gestae 8 Para 4 (one gemellar pregnancy)   Menopause 14 yrs ago.     Physical Exam:  /80   Pulse 80   Wt 84.1 kg (185 lb 6.4 oz)   LMP  (LMP Unknown)   BMI 33.69 kg/m    General : Alert, oriented X4, mildly distressed, no cyanosis.   Chest: clear to auscultation  Hearth: RRR  Abd: Pain on palpation of lower abdomen  Psychological: appropriate mood and affect      Lab results    Labs reviewed      Assessment and Plan:  Mrs.  Mark is a 54 year old woman with a ACTH producing pituitary macroadenoma s/p pituitary surgery on 3/25/2019.     Overall, she is feeling better, HA improved, able to taper prednisone, but today's main concern is multiple joint / muscle pain.   I am not sure this is from relative cortisol deficiency (she has still Cushing residual). ACTH preop 300-400 range, post op 100-110 range. Other possibility is GH deficiency. Or non pituitary related symptoms.    We will check following  - IGF1,   -TSH, free T4  -Cortisol, ACTH   -BMP     Addendum  She has subclinical hypothryodism, we will try levothyroxine 75 mcg. Random coritisol seems relatiiely low.    5/29/2019 12:09 5/29/2019 12:10   Sodium  141   Potassium  3.8   Chloride  107   Carbon Dioxide  25   Urea Nitrogen  8   Creatinine  0.59   GFR Estimate  >90   GFR Estimate If Black  >90   Calcium  10.1   Anion Gap  9   Adrenal Corticotropin  116 (H)   Cortisol Serum 3.1 (L)    T4 Free  0.80   TSH  4.40 (H)       Tete Weir MD  Staff Physician  Endocrinology and Metabolism  [facility]  License: [place] [id number]  Pager: [phone]

## 2019-05-30 DIAGNOSIS — E03.9 HYPOTHYROIDISM, UNSPECIFIED TYPE: Primary | ICD-10-CM

## 2019-05-30 LAB — ACTH PLAS-MCNC: 116 PG/ML

## 2019-05-30 RX ORDER — LEVOTHYROXINE SODIUM 75 UG/1
75 TABLET ORAL DAILY
Qty: 90 TABLET | Refills: 3 | Status: SHIPPED | OUTPATIENT
Start: 2019-05-30 | End: 2020-07-21

## 2019-05-31 LAB — IGF-I BLD-MCNC: 99 NG/ML (ref 51–233)

## 2019-05-31 NOTE — PROGRESS NOTES
May 30, 2019    Dear ,    We are writing to inform you of your test results.  My recommendation is to start small dose of thyroid medication (levothryoxine 75 mcg daily).    Please take care    Tete Weir MD

## 2019-06-06 ENCOUNTER — NURSE TRIAGE (OUTPATIENT)
Dept: NURSING | Facility: CLINIC | Age: 54
End: 2019-06-06

## 2019-06-06 ENCOUNTER — TELEPHONE (OUTPATIENT)
Dept: ENDOCRINOLOGY | Facility: CLINIC | Age: 54
End: 2019-06-06

## 2019-06-06 DIAGNOSIS — E24.0 PITUITARY DEPENDENT CUSHING DISEASE (H): Primary | ICD-10-CM

## 2019-06-06 DIAGNOSIS — Z98.890 HISTORY OF PITUITARY SURGERY: ICD-10-CM

## 2019-06-06 NOTE — TELEPHONE ENCOUNTER
"S: Calling about pain and swelling.    B: Daughter Slim calling about mother.  Mother was conferenced  into call.  Permission to talk with daughter.  Daughter was  interpreting [place] for mother.     C/O  Bilateral pitting L/E edema from knees to feet and pain.  Having difficulty walking due to swelling and the bottom of feet hurting. When turns in bed from side to side it hurts. \"My joints are cracking.\"  This all started about two weeks ago.    PMH includes ACTH secreting pituitary macroadenoma. Had a endoscopic Stealth-guided transsphenoidal resection of pituitary tumor on 3/25/2019. In addition, has subclinical hypothyroidism on 5/29/19 started on levothyroxine 75 mcg.     Reviewed lab results from 5/29/19 appointment.    A: Care advise suggests to see a provider today.    R: Patient will go the [facility] in [place]. Daughter insisted I call the ED and let them know her mom is coming, writer called.    Kaley Padron RN, [facility]      Reason for Disposition    [1] Swelling is painful to touch AND [2] fever    Additional Information    Negative: Severe difficulty breathing (e.g., struggling for each breath, speaks in single words)    Negative: Looks like a broken bone or dislocated joint (e.g., crooked or deformed)    Negative: Sounds like a life-threatening emergency to the triager    Negative: Difficulty breathing at rest    Negative: Entire foot is cool or blue in comparison to other side    Negative: [1] Can't walk or can barely walk AND [2] new onset    Negative: [1] Difficulty breathing with exertion (e.g., walking) AND [2] new onset or worsening    Negative: [1] Red area or streak AND [2] fever    Protocols used: LEG SWELLING AND EDEMA-A-AH      "

## 2019-06-07 ENCOUNTER — TELEPHONE (OUTPATIENT)
Dept: ENDOCRINOLOGY | Facility: CLINIC | Age: 54
End: 2019-06-07

## 2019-06-07 NOTE — TELEPHONE ENCOUNTER
Called by komal Mayberry from [place].     Patient presented to local ER today, telling ER staff she was sent by [facility].     Based on documentation, it appears patients daughter was worried about some leg swelling and joint aches. Nurse line reviewed labs, and recommended pt be seen today, and daughter wanted the patient to go to the ER.     Per the ER staff, the patient reports some mild LE swelling which did not appear significant, and some joint aches that have been reportedly stable since 3/2019..   Her 12pm cortisol level was relatively low at 3.1 on 5/29.   Her previous ACTH stim test showed an elevated ACTH with relatively low AM cortisol of 6, with appropriate stim to 20.   Cortisol tonight at OSH is 2.6.    It appears she was on low dose prednisone taper, but it is unclear exactly when this ended.   Given unclear picture with relatively low Cortisol levels at 12pm and possible worsening of symptoms (without any other lab or vital abnormalities per ER staff), I recommended she be started on a low dose hydrocortisone 15mg AM, 5mg pm for now. I will forward to patients endocrinologist Dr. Weir for review and decisions on next steps.     Rashida Chaidez MD  Fellow in Diabetes, Endocrinology, and Metabolism  PGY5  Pager [phone]

## 2019-06-07 NOTE — TELEPHONE ENCOUNTER
----- Message from Rashida Chaidez MD sent at 6/7/2019  1:28 PM CDT -----  Regarding: post ED follow up  Hello    I was called about this pt last night from [facility]. Apparently she was not feeling well and nurse triage line recommended she be seen yesterday because her cortisol was on low side.     I told them they can start physiologic dose of hydrocortisone (15mg am, 5mg pm) because she was otherwise stable. She has h/o cushing disease s/p resection in march, has been on prednisone in the past but stopped a few weeks ago and was feeling worse.    Please call her and daughter to follow up to see how she is doing. She is pt of Dr. Weir who is out apparently.   Thanks  Destinee

## 2019-06-07 NOTE — TELEPHONE ENCOUNTER
"Spoke w/ Pts Daughters says she's \"better\" - and spoke w/ her mother on conference call- have not received Rx from Destinee Chaidez, but did receive something from \"the hospital\" and Pt was unable to say what the Rx was.   [facility] in [facility] states she received Hyrocortisone 5mg tablets tablets and ibuprofen from provider [place].  Spoke w/ Pts daughter and clarified 15mg in the am and 5mg in the pm per Destinee Chaidez notes. Pts daughter confirmed orders with read back. No other questions or concerns at this time.   Chely Patterson RN on 6/7/2019 at 1:46 PM       "

## 2019-06-12 ENCOUNTER — OFFICE VISIT (OUTPATIENT)
Dept: OTOLARYNGOLOGY | Facility: CLINIC | Age: 54
End: 2019-06-12
Payer: COMMERCIAL

## 2019-06-12 VITALS
HEART RATE: 84 BPM | RESPIRATION RATE: 16 BRPM | DIASTOLIC BLOOD PRESSURE: 90 MMHG | BODY MASS INDEX: 33.36 KG/M2 | WEIGHT: 183.6 LBS | SYSTOLIC BLOOD PRESSURE: 145 MMHG

## 2019-06-12 DIAGNOSIS — D35.2 PITUITARY ADENOMA (H): Primary | ICD-10-CM

## 2019-06-12 ASSESSMENT — PAIN SCALES - GENERAL: PAINLEVEL: MODERATE PAIN (4)

## 2019-06-12 NOTE — PATIENT INSTRUCTIONS
Thank You for choosing [facility]. You were seen in the ENT Clinic today.     has recommended the followin.start staline irrigations    If you have any questions or concerns after your appointment, please  Call [phone].

## 2019-06-12 NOTE — LETTER
6/12/2019       RE: Francis Flores  [address]  [facility] [address]     Dear Colleague,    Thank you for referring your patient, Francis Flores, to the [facility] at [facility]. Please see a copy of my visit note below.    S/P surgery for pituitary lesion:     3/25/2019 Optical tracking system endoscopic resection tumor cranial (N/A) Procedure: Stealth Assisted Endoscopic Transnasal Resection Of Pituitary Tumor; Surgeon: Derrell Crain MD; Location: [facility] OR     C/O bodily pain and some nasal discomfort L>R.     Patient Supplied Answers to Review of Systems  [facility] ROS 4/17/2019   Constitutional Appetite change, Unexplained fatigue   Neurology Dizzy spells, Numbness, Unexplained weakness, Headache   Eyes Visual loss, Double vision   Ears, Nose, Throat -   Cardiopulmonary Wheezing   Gastrointestinal/Genitourinary Heartburn/indigestion   Musculoskeletal Sore or stiff joints, Back pain, Neck pain, Swollen legs/feet       Exam: anterior rhinoscopy shows some crusting. Therefore endoscopy indicated for debridement.    Procedure:  Endoscopy indicated for debridement  Topical anesthetic/decongestant spray applied.  Rigid scope used for visualization.  Findings: large crust removed from posterior nasal cavity. Underlying mucosa healthy.     A/P: RTC 1-2 months for repeat exam. Increase nasal rinsing. Nurse provided additional teaching.     Again, thank you for allowing me to participate in the care of your patient.      Sincerely,    Leidy Jeong MD

## 2019-06-12 NOTE — NURSING NOTE
Chief Complaint   Patient presents with     RECHECK     post op follow up visit     Jelly Lin LPN

## 2019-06-16 NOTE — PROGRESS NOTES
S/P surgery for pituitary lesion:     3/25/2019 Optical tracking system endoscopic resection tumor cranial (N/A) Procedure: Stealth Assisted Endoscopic Transnasal Resection Of Pituitary Tumor; Surgeon: Derrell Crain MD; Location: [facility] OR     C/O bodily pain and some nasal discomfort L>R.     Patient Supplied Answers to Review of Systems   ENT ROS 4/17/2019   Constitutional Appetite change, Unexplained fatigue   Neurology Dizzy spells, Numbness, Unexplained weakness, Headache   Eyes Visual loss, Double vision   Ears, Nose, Throat -   Cardiopulmonary Wheezing   Gastrointestinal/Genitourinary Heartburn/indigestion   Musculoskeletal Sore or stiff joints, Back pain, Neck pain, Swollen legs/feet       Exam: anterior rhinoscopy shows some crusting. Therefore endoscopy indicated for debridement.    Procedure:  Endoscopy indicated for debridement  Topical anesthetic/decongestant spray applied.  Rigid scope used for visualization.  Findings: large crust removed from posterior nasal cavity. Underlying mucosa healthy.     A/P: RTC 1-2 months for repeat exam. Increase nasal rinsing. Nurse provided additional teaching.

## 2019-07-08 ENCOUNTER — PATIENT OUTREACH (OUTPATIENT)
Dept: NEUROSURGERY | Facility: CLINIC | Age: 54
End: 2019-07-08

## 2019-07-08 NOTE — PROGRESS NOTES
Pt's daughter called and wanted to schedule an appt with Dr. Crain.  Writer stated that she would send the  a note and have the  reach out to pt's mother to schedule an appt.

## 2019-07-15 ENCOUNTER — OFFICE VISIT (OUTPATIENT)
Dept: NEUROSURGERY | Facility: CLINIC | Age: 54
End: 2019-07-15
Payer: COMMERCIAL

## 2019-07-15 VITALS
BODY MASS INDEX: 30.73 KG/M2 | WEIGHT: 180 LBS | DIASTOLIC BLOOD PRESSURE: 81 MMHG | HEIGHT: 64 IN | SYSTOLIC BLOOD PRESSURE: 117 MMHG | HEART RATE: 81 BPM | OXYGEN SATURATION: 99 %

## 2019-07-15 DIAGNOSIS — M25.50 ARTHRALGIA OF MULTIPLE SITES: ICD-10-CM

## 2019-07-15 DIAGNOSIS — E24.0 PITUITARY-DEPENDENT CUSHING'S DISEASE (H): Primary | ICD-10-CM

## 2019-07-15 RX ORDER — NAPROXEN 250 MG/1
250 TABLET ORAL 2 TIMES DAILY WITH MEALS
Qty: 60 TABLET | Refills: 0 | Status: SHIPPED | OUTPATIENT
Start: 2019-07-15 | End: 2019-08-14

## 2019-07-15 ASSESSMENT — PAIN SCALES - GENERAL: PAINLEVEL: SEVERE PAIN (6)

## 2019-07-15 ASSESSMENT — MIFFLIN-ST. JEOR: SCORE: 1393.53

## 2019-07-15 NOTE — PROGRESS NOTES
Dear Dr. Stack:    We saw Ms. Francis Flores today in Pituitary Clinic for follow-up of her pituitary adenoma which was causing Cushing's disease.This tumor was resected through an endoscopic transnasal approach on 3/25/2019. Of note, this visit was performed with the assistance of a [facility] .     She reports generalized body aches and joint pain. This pain is different than her preoperative pain. She is using a cane because of difficulty walking due to pain. Her pain is greatest in her extremities, specifically at the joints. She has difficulty picking up objects. She is now on levothyroxine but has not noticed any changes after starting it. She obtained relief with ibuprofen but she does not want to continue taking it. She would like to try a different medication for pain relief.     PHYSICAL EXAM: On exam, her general appearance is good. Extraocular movements intact. She moves all extremities equally and with good coordination. She has diffuse mild tenderness in all the joints. There is no warmth or swelling of any of the joints that we examined, specifically the elbows, wrists, digits, ankles, and knees.     REVIEW OF STUDIES: There were no new studies to review.     IMPRESSION AND PLAN: It is not totally clear if we have achieved a cure for her Cushing's disease. Certainly, her post-operative malaise and arthralgias provide clinical evidence that we are heading in that direction. We will pursue symptomatic relief as our endocrinologists continue their evaluation for residual Cushing's disease. She is due for an MRI of the pituitary which we will arrange for in the near future. We will recheck a morning cortisol level. We will start her on naproxen for her arthralgias and we gave her instructions on its use and potential side effects. We will see her back in about 3-4 weeks after her MRI has been completed.     Please do not hesitate to contact us with questions. We will keep you informed of her  progress.     CANDIDA BAEZ MD    I, Linnea Hoffmann, am serving as a scribe to document services personally performed by Candida Baez MD, based upon my observations and the provider's statements to me. All documentation has been reviewed by the aforementioned doctor prior to being entered into the official medical record.

## 2019-07-15 NOTE — LETTER
7/15/2019       RE: Francis Flores  [address]  [facility] [address]     Dear Dr. Stack:    We saw Ms. Francis Flores today in Pituitary Clinic for follow-up of her pituitary adenoma which was causing Cushing's disease.This tumor was resected through an endoscopic transnasal approach on 3/25/2019. Of note, this visit was performed with the assistance of a [facility] .     She reports generalized body aches and joint pain. This pain is different than her preoperative pain. She is using a cane because of difficulty walking due to pain. Her pain is greatest in her extremities, specifically at the joints. She has difficulty picking up objects. She is now on levothyroxine but has not noticed any changes after starting it. She obtained relief with ibuprofen but she does not want to continue taking it. She would like to try a different medication for pain relief.     PHYSICAL EXAM: On exam, her general appearance is good. Extraocular movements intact. She moves all extremities equally and with good coordination. She has diffuse mild tenderness in all the joints. There is no warmth or swelling of any of the joints that we examined, specifically the elbows, wrists, digits, ankles, and knees.     REVIEW OF STUDIES: There were no new studies to review.     IMPRESSION AND PLAN: It is not totally clear if we have achieved a cure for her Cushing's disease. Certainly, her post-operative malaise and arthralgias provide clinical evidence that we are heading in that direction. We will pursue symptomatic relief as our endocrinologists continue their evaluation for residual Cushing's disease. She is due for an MRI of the pituitary which we will arrange for in the near future. We will recheck a morning cortisol level. We will start her on naproxen for her arthralgias and we gave her instructions on its use and potential side effects. We will see her back in about 3-4 weeks after her MRI has been completed.      Please do not hesitate to contact us with questions. We will keep you informed of her progress.     CANDIDA BAEZ MD    I, Linnea Hoffmann, am serving as a scribe to document services personally performed by Candida Baez MD, based upon my observations and the provider's statements to me. All documentation has been reviewed by the aforementioned doctor prior to being entered into the official medical record.

## 2019-07-15 NOTE — NURSING NOTE
Chief Complaint   Patient presents with     RECHECK     [facility] RETURN - FOLLOW UP       Feroz Greenwood, EMT

## 2019-07-15 NOTE — PATIENT INSTRUCTIONS
An order has been written for a MRI.  After the MRI is done come back to clinic to see Dr. Crain.    Please have blood drawn for a cortisol level.  It is best that this lab is drawn first thing in the AM (8:00am) for the most accurate level.    Prescription has been written for Naproxen.  Please at take on a full stomach and drink a good amount of water.  Stop taking if you have GI upset.

## 2019-07-15 NOTE — LETTER
7/15/2019      RE: Francis Flores  [address]  [facility] [address]       Dear Dr. Stack:    We saw Ms. Francis Flores today in Pituitary Clinic for follow-up of her pituitary adenoma which was causing Cushing's disease.This tumor was resected through an endoscopic transnasal approach on 3/25/2019. Of note, this visit was performed with the assistance of a [facility] .     She reports generalized body aches and joint pain. This pain is different than her preoperative pain. She is using a cane because of difficulty walking due to pain. Her pain is greatest in her extremities, specifically at the joints. She has difficulty picking up objects. She is now on levothyroxine but has not noticed any changes after starting it. She obtained relief with ibuprofen but she does not want to continue taking it. She would like to try a different medication for pain relief.     PHYSICAL EXAM: On exam, her general appearance is good. Extraocular movements intact. She moves all extremities equally and with good coordination. She has diffuse mild tenderness in all the joints. There is no warmth or swelling of any of the joints that we examined, specifically the elbows, wrists, digits, ankles, and knees.     REVIEW OF STUDIES: There were no new studies to review.     IMPRESSION AND PLAN: It is not totally clear if we have achieved a cure for her Cushing's disease. Certainly, her post-operative malaise and arthralgias provide clinical evidence that we are heading in that direction. We will pursue symptomatic relief as our endocrinologists continue their evaluation for residual Cushing's disease. She is due for an MRI of the pituitary which we will arrange for in the near future. We will recheck a morning cortisol level. We will start her on naproxen for her arthralgias and we gave her instructions on its use and potential side effects. We will see her back in about 3-4 weeks after her MRI has been completed.      Please do not hesitate to contact us with questions. We will keep you informed of her progress.     CANDIDA BAEZ MD    I, Linnea Hoffmann, am serving as a scribe to document services personally performed by Candida Baez MD, based upon my observations and the provider's statements to me. All documentation has been reviewed by the aforementioned doctor prior to being entered into the official medical record.

## 2019-07-24 DIAGNOSIS — E24.0 PITUITARY-DEPENDENT CUSHING'S DISEASE (H): ICD-10-CM

## 2019-07-24 LAB
ANION GAP SERPL CALCULATED.3IONS-SCNC: 6 MMOL/L (ref 3–14)
BUN SERPL-MCNC: 10 MG/DL (ref 7–30)
CALCIUM SERPL-MCNC: 9.1 MG/DL (ref 8.5–10.1)
CHLORIDE SERPL-SCNC: 109 MMOL/L (ref 94–109)
CO2 SERPL-SCNC: 26 MMOL/L (ref 20–32)
CORTIS SERPL-MCNC: 1.9 UG/DL (ref 4–22)
CREAT SERPL-MCNC: 0.61 MG/DL (ref 0.52–1.04)
FSH SERPL-ACNC: 45.9 IU/L
GFR SERPL CREATININE-BSD FRML MDRD: >90 ML/MIN/{1.73_M2}
GLUCOSE SERPL-MCNC: 81 MG/DL (ref 70–99)
LH SERPL-ACNC: 24 IU/L
POTASSIUM SERPL-SCNC: 3.4 MMOL/L (ref 3.4–5.3)
PROLACTIN SERPL-MCNC: 8 UG/L (ref 3–27)
SODIUM SERPL-SCNC: 141 MMOL/L (ref 133–144)
T4 FREE SERPL-MCNC: 1.14 NG/DL (ref 0.76–1.46)
TSH SERPL DL<=0.005 MIU/L-ACNC: 0.24 MU/L (ref 0.4–4)

## 2019-07-25 LAB
GH SERPL-MCNC: 0.1 UG/L
IGF-I BLD-MCNC: 132 NG/ML (ref 51–233)
TESTOST SERPL-MCNC: <2 NG/DL (ref 8–60)

## 2019-07-26 LAB — ACTH PLAS-MCNC: 55 PG/ML

## 2019-07-31 LAB — A-PGH SER-MCNC: 0.8 NG/ML

## 2019-08-01 ENCOUNTER — ANCILLARY PROCEDURE (OUTPATIENT)
Dept: MRI IMAGING | Facility: CLINIC | Age: 54
End: 2019-08-01
Attending: NEUROLOGICAL SURGERY
Payer: COMMERCIAL

## 2019-08-01 DIAGNOSIS — E24.0 PITUITARY-DEPENDENT CUSHING'S DISEASE (H): ICD-10-CM

## 2019-08-01 RX ORDER — GADOBUTROL 604.72 MG/ML
7.5 INJECTION INTRAVENOUS ONCE
Status: COMPLETED | OUTPATIENT
Start: 2019-08-01 | End: 2019-08-01

## 2019-08-01 RX ADMIN — GADOBUTROL 7.5 ML: 604.72 INJECTION INTRAVENOUS at 11:30

## 2019-09-03 ENCOUNTER — OFFICE VISIT (OUTPATIENT)
Dept: NEUROSURGERY | Facility: CLINIC | Age: 54
End: 2019-09-03
Payer: COMMERCIAL

## 2019-09-03 VITALS
HEIGHT: 64 IN | TEMPERATURE: 97.5 F | SYSTOLIC BLOOD PRESSURE: 112 MMHG | HEART RATE: 66 BPM | RESPIRATION RATE: 16 BRPM | WEIGHT: 173.1 LBS | DIASTOLIC BLOOD PRESSURE: 78 MMHG | BODY MASS INDEX: 29.55 KG/M2 | OXYGEN SATURATION: 99 %

## 2019-09-03 DIAGNOSIS — E24.0 CUSHING'S DISEASE (H): Primary | ICD-10-CM

## 2019-09-03 ASSESSMENT — PAIN SCALES - GENERAL: PAINLEVEL: MODERATE PAIN (5)

## 2019-09-03 ASSESSMENT — MIFFLIN-ST. JEOR: SCORE: 1362.31

## 2019-09-03 NOTE — LETTER
9/3/2019       RE: Francis Flores  [address]  [facility] [address]     Dear Dr. Dewey:    We saw Ms. Francis Flores today in Pituitary Clinic for MRI follow-up of her pituitary adenoma which was causing Cushing's disease.This tumor was resected through an endoscopic transnasal approach on 3/25/2019. Of note, this visit was performed with the assistance of a [facility] .     Currently, she is doing better. She continues to report low energy levels and joint pain, especially in her hips and knees. She is managing her pain with ibuprofen. She is scheduled to see Dr. Weir in January. She also reports a generalized headache and losing approximately 12 lbs recently. She denies hirsutism. She reports the prednisone you prescribed was beneficial for pain relief. The naproxen we prescribed at our last visit was not helpful.     PHYSICAL EXAM: On exam, her general appearance is good. As before, she does not have a typical Cushingoid phenotype. Her gross neurological examination remains normal.     REVIEW OF STUDIES: We went over her MRI from August with her. This shows that the hypoenhancement on the left side of the sella and parasellar space is definitely smaller. There is some residual enhancement along the floor of the sella and there is some patchy hypoenhancement in the left parsellar space. It is not clear if this presents residual tumor or post-surgical changes. The pituitary gland is anatomically intact and displaced to the right side of the sella. The optic apparatus is completely decompressed.    IMPRESSION AND PLAN: It is not clear to us if she is cured of her Cushing's disease. She certainly had a very low morning cortisol level back in May and her ACTH remained elevated. Her post-operative clinical course has been typical of a surgical cure. We will arrange for her to see our pituitary endocrinologist, Dr. Weir, sooner than scheduled. It is our understanding that she received a  month-long course of prednisone back in July and this translated into some clinical improvement. We will forward all of this information to Dr. Weir and make recommendations accordingly. We will arrange to see her on the same day as Dr. Weir. Any further imaging will be determined based on her clinical course.     Please do not hesitate to contact us with questions. We will keep you informed of her progress.     CANDIDA BAEZ MD    I, Linnea Hoffmann, am serving as a scribe to document services personally performed by Candida Baez MD, based upon my observations and the provider's statements to me. All documentation has been reviewed by the aforementioned doctor prior to being entered into the official medical record.

## 2019-09-03 NOTE — PROGRESS NOTES
Dear Dr. Dewey:    We saw Ms. Francis Flores today in Pituitary Clinic for MRI follow-up of her pituitary adenoma which was causing Cushing's disease.This tumor was resected through an endoscopic transnasal approach on 3/25/2019. Of note, this visit was performed with the assistance of a [facility] .     Currently, she is doing better. She continues to report low energy levels and joint pain, especially in her hips and knees. She is managing her pain with ibuprofen. She is scheduled to see Dr. Weir in January. She also reports a generalized headache and losing approximately 12 lbs recently. She denies hirsutism. She reports the prednisone you prescribed was beneficial for pain relief. The naproxen we prescribed at our last visit was not helpful.     PHYSICAL EXAM: On exam, her general appearance is good. As before, she does not have a typical Cushingoid phenotype. Her gross neurological examination remains normal.     REVIEW OF STUDIES: We went over her MRI from August with her. This shows that the hypoenhancement on the left side of the sella and parasellar space is definitely smaller. There is some residual enhancement along the floor of the sella and there is some patchy hypoenhancement in the left parsellar space. It is not clear if this presents residual tumor or post-surgical changes. The pituitary gland is anatomically intact and displaced to the right side of the sella. The optic apparatus is completely decompressed.    IMPRESSION AND PLAN: It is not clear to us if she is cured of her Cushing's disease. She certainly had a very low morning cortisol level back in May and her ACTH remained elevated. Her post-operative clinical course has been typical of a surgical cure. We will arrange for her to see our pituitary endocrinologist, Dr. Weir, sooner than scheduled. It is our understanding that she received a month-long course of prednisone back in July and this translated into some clinical  improvement. We will forward all of this information to Dr. Weir and make recommendations accordingly. We will arrange to see her on the same day as Dr. Weir. Any further imaging will be determined based on her clinical course.     Please do not hesitate to contact us with questions. We will keep you informed of her progress.     CANDIDA BAEZ MD    I, Linnea Hoffmann, am serving as a scribe to document services personally performed by Candida Baez MD, based upon my observations and the provider's statements to me. All documentation has been reviewed by the aforementioned doctor prior to being entered into the official medical record.

## 2019-09-03 NOTE — LETTER
9/3/2019      RE: Francis Flores  [address]  [facility] [address]       Dear Dr. Dewey:    We saw Ms. Francis Flores today in Pituitary Clinic for MRI follow-up of her pituitary adenoma which was causing Cushing's disease.This tumor was resected through an endoscopic transnasal approach on 3/25/2019. Of note, this visit was performed with the assistance of a [facility] .     Currently, she is doing better. She continues to report low energy levels and joint pain, especially in her hips and knees. She is managing her pain with ibuprofen. She is scheduled to see Dr. Weir in January. She also reports a generalized headache and losing approximately 12 lbs recently. She denies hirsutism. She reports the prednisone you prescribed was beneficial for pain relief. The naproxen we prescribed at our last visit was not helpful.     PHYSICAL EXAM: On exam, her general appearance is good. As before, she does not have a typical Cushingoid phenotype. Her gross neurological examination remains normal.     REVIEW OF STUDIES: We went over her MRI from August with her. This shows that the hypoenhancement on the left side of the sella and parasellar space is definitely smaller. There is some residual enhancement along the floor of the sella and there is some patchy hypoenhancement in the left parsellar space. It is not clear if this presents residual tumor or post-surgical changes. The pituitary gland is anatomically intact and displaced to the right side of the sella. The optic apparatus is completely decompressed.    IMPRESSION AND PLAN: It is not clear to us if she is cured of her Cushing's disease. She certainly had a very low morning cortisol level back in May and her ACTH remained elevated. Her post-operative clinical course has been typical of a surgical cure. We will arrange for her to see our pituitary endocrinologist, Dr. Weir, sooner than scheduled. It is our understanding that she received a  month-long course of prednisone back in July and this translated into some clinical improvement. We will forward all of this information to Dr. Weir and make recommendations accordingly. We will arrange to see her on the same day as Dr. Weir. Any further imaging will be determined based on her clinical course.     Please do not hesitate to contact us with questions. We will keep you informed of her progress.     CANDIDA BAEZ MD    I, Linnea Hoffmann, am serving as a scribe to document services personally performed by Candida Baez MD, based upon my observations and the provider's statements to me. All documentation has been reviewed by the aforementioned doctor prior to being entered into the official medical record.

## 2019-09-03 NOTE — NURSING NOTE
Chief Complaint   Patient presents with     PITUITARY ADENOMA     [facility] RETURN     Eva Benavidez, CMA

## 2019-09-03 NOTE — PATIENT INSTRUCTIONS
The  will reach out to you and move up your appointment with Dr. Weir, endocrinologist.  Once that appointment is set we will have you see Dr. Crain the same day.

## 2019-10-01 ENCOUNTER — OFFICE VISIT (OUTPATIENT)
Dept: ENDOCRINOLOGY | Facility: CLINIC | Age: 54
End: 2019-10-01
Payer: COMMERCIAL

## 2019-10-01 VITALS
SYSTOLIC BLOOD PRESSURE: 120 MMHG | BODY MASS INDEX: 28.92 KG/M2 | WEIGHT: 169.4 LBS | HEART RATE: 72 BPM | HEIGHT: 64 IN | DIASTOLIC BLOOD PRESSURE: 82 MMHG

## 2019-10-01 DIAGNOSIS — E24.9 CUSHING'S SYNDROME (H): Primary | ICD-10-CM

## 2019-10-01 ASSESSMENT — MIFFLIN-ST. JEOR: SCORE: 1345.45

## 2019-10-01 ASSESSMENT — PAIN SCALES - GENERAL: PAINLEVEL: MODERATE PAIN (5)

## 2019-10-01 NOTE — PROGRESS NOTES
Endocrinology and Diabetes Outpatient Clinic      HPI: Mrs. Flores is a 54 year old [place] woman that is seen today for follow-up of a ACTH secreting pituitary macroadenoma s/p endoscopic Stealth-guided transsphenoidal resection of pituitary tumor on 3/25/2019.    Today's visit was facilitated by an .  Briefly, Mrs. Flores  had a pituitary macroadenoma diagnosed in 2008. Patient has had multiple evaluations over the years and was lost to follow-up for a period of time.  She came back to our clinic in 2018, after been evaluated at [facility]. Please see my prior notes for further detail on her prior history.  As patient had confirmed excess ACTH production and a pituitary mass, she was seen by neurosurgery and had a endoscopic Stealth-guided transsphenoidal resection of pituitary tumor on 3/25/2019.  Surgery was not considered curative and she was discharged home with no steroid replacement therapy.  Post operative cortisol did not reached less than 2-3. Pathology compatible to ACTH stained adenoma with fibrous tissues.   Labs from 4/1/2019 were okay. Subsequently the patient presented to clinic reporting nausea and vomiting that started the night prior to her visit, accompanied by abdominal pain.  No fever, no diarrhea, no dysuria. She also reported frontal headache and blood discharge from her nose.  No clear liquid coming out from her nose.  In addition to this one, she visited the ED several times and reported she was not feeling well.  She was placed on a prednisone taper, felt better initially, and has been now off steroids for a while.    She is on levothyroxine 75 mcg/day that she takes regularly.  No recent dose changes.  She denies symptoms suggestive of hypo- or hyperthyroidism.     Other than complain of pain in multiple muscles and joints (present for many years) she feels well and has no new complains today.  She states she is happy with her care and appreciates all the time we spend  "explaining things to her and answering her questions.  She has not yet done the dexa scan and is wondering if now it would be a good time for her to get it done.     ROS:  11 point ROS as detailed in the HPI above or negative    Medications  Current Outpatient Medications   Medication     calcium carbonate (OS- MG [place]. CA) 500 tablet     ibuprofen 200 MG capsule     levothyroxine (SYNTHROID/LEVOTHROID) 75 MCG tablet     pantoprazole sodium 40 MG tablet     polyethylene glycol (MIRALAX/GLYCOLAX) packet     ranitidine (ZANTAC) 150 MG tablet     sodium chloride (OCEAN) 0.65 % nasal spray     VITAMIN D, CHOLECALCIFEROL, PO     ondansetron (ZOFRAN) 4 MG tablet     ondansetron (ZOFRAN-ODT) 4 MG ODT tab     senna-docusate (SENOKOT-S/PERICOLACE) 8.6-50 MG tablet     No current facility-administered medications for this visit.      Family Hx   No Family Hx of pituitary disease, asthma or osteoporosis.   Has 4 healthy children, 25 yrs old girl, 20 yrs old twin boys and 17 yrs old boy.     Social Hx   Behavioral history: No tobacco use.   Home environment: No secondhand tobacco smoke in home.   Mrs. Flores does not work.   She lives with her  and their 4 children. Mrs. Flores lives in [place].  She does not smoke or consume alcohol.     PMH   Illnesses:   Non functioning (?) pituitary tumor as per HPI   Early ovarian failure/ incorrect age?  Osteoporosis/previous bone fracture     Surgeries:   Right knee (11/2008)   TSS 3/25/2019    Menstrual History:   Menarche at age 13   Cycles were regular   Gestae 8 Para 4 (one gemellar pregnancy)   Menopause ~14 yrs ago.     Physical Exam:  /82   Pulse 72   Ht 1.613 m (5' 3.5\")   Wt 76.8 kg (169 lb 6.4 oz)   LMP  (LMP Unknown)   BMI 29.54 kg/m    General : Alert, oriented X4, not distressed, no cyanosis.   Neck: No LN are palpable in the cervical area  Chest: clear to auscultation  Hearth: RRR  Abd: No masses are palpable, no pain during palpation.  Extremeties: " moving all extremities, no significant peripheral edema  Psychological: appropriate mood and affect      Lab results    Labs reviewed      Assessment and Plan:  Mrs. 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     1. Cushing's syndrome:  Mrs. 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  The patient is not on steroid replacement therapy and she feels well overall.  She lost >10 lbs since surgery.  BP is normal.  I spoke to dr. Crain soon after Ms. Flores's appointment, and there is no need for imaging at this time.    2. Hypothyroidism: Managed with levothyroxine 75 mcg/day.  3. Multiple joint / muscle pain:  This is a chronic problem whose cause remains unknown.  Ms. Flores reports the intensity of her symptoms vary over time, but denies significant worsening or worrisome symptoms.  4. Bone health: Ms. Flores is on vitamin D replacement therapy.  We will obtain a dexa scan to evaluate bone density in the setting of long-standing hypercortisolism.     Orders Placed This Encounter   Procedures     Dexa hip/pelvis/spine     Dexa Wrist/Heel     Ms. Flores will follow-up with Dr. Epstein in the pituitary clinic so that she does not need to come another day to see neurosurgery if needed. I am also happy in seeing her in follow-up if the patient wishes so and/or this facilitates her care.  She declined doing labs today and will have lab on her next visit.  I will contact Dr. Weir so that she is aware and can order labs prior to her next visit if indicated.    I will contact the patient with the dexa scan results.      Maine James MD PhD    Division of Endocrinology and Diabetes

## 2019-10-01 NOTE — LETTER
10/1/2019       RE: Francis Flores  [address]  [facility] [address]     Dear Colleague,    Thank you for referring your patient, Francis Flores, to the [facility] ENDOCRINOLOGY at [facility]. Please see a copy of my visit note below.    Endocrinology and Diabetes Outpatient Clinic      HPI: Mrs. Flores is a 54 year old [place] woman that is seen today for follow-up of a ACTH secreting pituitary macroadenoma s/p endoscopic Stealth-guided transsphenoidal resection of pituitary tumor on 3/25/2019.    Today's visit was facilitated by an .  Briefly, Mrs. Flores  had a pituitary macroadenoma diagnosed in 2008. Patient has had multiple evaluations over the years and was lost to follow-up for a period of time.  She came back to our clinic in 2018, after been evaluated at [facility]. Please see my prior notes for further detail on her prior history.  As patient had confirmed excess ACTH production and a pituitary mass, she was seen by neurosurgery and had a endoscopic Stealth-guided transsphenoidal resection of pituitary tumor on 3/25/2019.  Surgery was not considered curative and she was discharged home with no steroid replacement therapy.  Post operative cortisol did not reached less than 2-3. Pathology compatible to ACTH stained adenoma with fibrous tissues.   Labs from 4/1/2019 were okay. Subsequently the patient presented to clinic reporting nausea and vomiting that started the night prior to her visit, accompanied by abdominal pain.  No fever, no diarrhea, no dysuria. She also reported frontal headache and blood discharge from her nose.  No clear liquid coming out from her nose.  In addition to this one, she visited the ED several times and reported she was not feeling well.  She was placed on a prednisone taper, felt better initially, and has been now off steroids for a while.    She is on levothyroxine 75 mcg/day that she takes regularly.  No recent  "dose changes.  She denies symptoms suggestive of hypo- or hyperthyroidism.     Other than complain of pain in multiple muscles and joints (present for many years) she feels well and has no new complains today.  She states she is happy with her care and appreciates all the time we spend explaining things to her and answering her questions.  She has not yet done the dexa scan and is wondering if now it would be a good time for her to get it done.     ROS:  11 point ROS as detailed in the HPI above or negative    Medications  Current Outpatient Medications   Medication     calcium carbonate (OS- MG [place]. CA) 500 tablet     ibuprofen 200 MG capsule     levothyroxine (SYNTHROID/LEVOTHROID) 75 MCG tablet     pantoprazole sodium 40 MG tablet     polyethylene glycol (MIRALAX/GLYCOLAX) packet     ranitidine (ZANTAC) 150 MG tablet     sodium chloride (OCEAN) 0.65 % nasal spray     VITAMIN D, CHOLECALCIFEROL, PO     ondansetron (ZOFRAN) 4 MG tablet     ondansetron (ZOFRAN-ODT) 4 MG ODT tab     senna-docusate (SENOKOT-S/PERICOLACE) 8.6-50 MG tablet     No current facility-administered medications for this visit.      Family Hx   No Family Hx of pituitary disease, asthma or osteoporosis.   Has 4 healthy children, 25 yrs old girl, 20 yrs old twin boys and 17 yrs old boy.     Social Hx   Behavioral history: No tobacco use.   Home environment: No secondhand tobacco smoke in home.   Mrs. Flores does not work.   She lives with her  and their 4 children. Mrs. Flores lives in [place].  She does not smoke or consume alcohol.     PMH   Illnesses:   Non functioning (?) pituitary tumor as per HPI   Early ovarian failure/ incorrect age?  Osteoporosis/previous bone fracture     Surgeries:   Right knee (11/2008)   TSS 3/25/2019    Menstrual History:   Menarche at age 13   Cycles were regular   Gestae 8 Para 4 (one gemellar pregnancy)   Menopause ~14 yrs ago.     Physical Exam:  /82   Pulse 72   Ht 1.613 m (5' 3.5\")   Wt " 76.8 kg (169 lb 6.4 oz)   LMP  (LMP Unknown)   BMI 29.54 kg/m     General : Alert, oriented X4, not distressed, no cyanosis.   Neck: No LN are palpable in the cervical area  Chest: clear to auscultation  Hearth: RRR  Abd: No masses are palpable, no pain during palpation.  Extremeties: moving all extremities, no significant peripheral edema  Psychological: appropriate mood and affect      Lab results    Labs reviewed      Assessment and Plan:  Mrs. 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     1. Cushing's syndrome:  Mrs. 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  The patient is not on steroid replacement therapy and she feels well overall.  She lost >10 lbs since surgery.  BP is normal.  I spoke to dr. Crain soon after Ms. Flores's appointment, and there is no need for imaging at this time.    2. Hypothyroidism: Managed with levothyroxine 75 mcg/day.  3. Multiple joint / muscle pain:  This is a chronic problem whose cause remains unknown.  Ms. Flores reports the intensity of her symptoms vary over time, but denies significant worsening or worrisome symptoms.  4. Bone health: Ms. Flores is on vitamin D replacement therapy.  We will obtain a dexa scan to evaluate bone density in the setting of long-standing hypercortisolism.     Orders Placed This Encounter   Procedures     Dexa hip/pelvis/spine     Dexa Wrist/Heel     Ms. Flores will follow-up with Dr. Epstein in the pituitary clinic so that she does not need to come another day to see neurosurgery if needed. I am also happy in seeing her in follow-up if the patient wishes so and/or this facilitates her care.  She declined doing labs today and will have lab on her next visit.  I will contact Dr. Weir so that she is aware and can order labs prior to her next visit if indicated.    I will contact the patient with the dexa scan  results.      Maine James MD PhD    Division of Endocrinology and Diabetes

## 2019-10-10 DIAGNOSIS — E24.0: Primary | ICD-10-CM

## 2019-10-15 ENCOUNTER — TELEPHONE (OUTPATIENT)
Dept: ENDOCRINOLOGY | Facility: CLINIC | Age: 54
End: 2019-10-15

## 2019-10-15 DIAGNOSIS — E24.0 PITUITARY DEPENDENT CUSHING DISEASE (H): Primary | ICD-10-CM

## 2019-10-15 NOTE — TELEPHONE ENCOUNTER
Francis to see Dr Weir  earlier  In late November early December. Clinic  Coordinators to contact her to schedule. This is a message from Dr James to Dr Weir :Hi Tete   I see many pts with T2D with increased C-reactive protein.  I see it as an unspecific marker of inflammation but associated with increased cardiovascular mortality. There are data suggesting that increased insulin sensitivity (by weight loss and exercise) can decrease CRP.  Perhaps with her improved Cushing's and improved metabolic abnormalities CRP will improve.   I could not find her protein S results.   Happy to discuss with you later.   Thanks,   Maine James MD PhD      Division of Endocrinology and Diabetes

## 2019-10-17 DIAGNOSIS — E27.0 HYPERCORTISOLEMIA (H): Primary | ICD-10-CM

## 2020-01-06 ENCOUNTER — OFFICE VISIT (OUTPATIENT)
Dept: OTOLARYNGOLOGY | Facility: CLINIC | Age: 55
End: 2020-01-06
Payer: COMMERCIAL

## 2020-01-06 VITALS — HEIGHT: 64 IN | BODY MASS INDEX: 29.71 KG/M2 | WEIGHT: 174 LBS

## 2020-01-06 DIAGNOSIS — D35.2 PITUITARY ADENOMA (H): Primary | ICD-10-CM

## 2020-01-06 DIAGNOSIS — J01.90 ACUTE SINUSITIS, RECURRENCE NOT SPECIFIED, UNSPECIFIED LOCATION: ICD-10-CM

## 2020-01-06 LAB
GRAM STN SPEC: ABNORMAL
GRAM STN SPEC: ABNORMAL
SPECIMEN SOURCE: ABNORMAL

## 2020-01-06 ASSESSMENT — PAIN SCALES - GENERAL: PAINLEVEL: NO PAIN (0)

## 2020-01-06 ASSESSMENT — MIFFLIN-ST. JEOR: SCORE: 1361.32

## 2020-01-06 NOTE — NURSING NOTE
"Chief Complaint   Patient presents with     RECHECK     Follow-up     Height 1.613 m (5' 3.5\"), weight 78.9 kg (174 lb).    Gina Rios, EMT    "

## 2020-01-06 NOTE — PATIENT INSTRUCTIONS
Thank You for choosing [facility]. You were seen in the ENT Clinic today.     has recommended the followin.Culture obtained, will call with results.if positive will order antibiotics, if not just follow up in 2-3 months.  2.CT maxiofacial, will call with results  3.Follow up with  in 2-3 months      If you have any questions or concerns after your appointment, please  Call [phone].

## 2020-01-06 NOTE — LETTER
"2020       RE: Francis Flores  [address]  [facility] [address]     Dear Colleague,    Thank you for referring your patient, Francis Flores, to the [facility] EAR NOSE [facility] Please see a copy of my visit note below.      Otolaryngology Clinic      Name: Francis Flores  MRN: [id number]  Age: 55 year old  : 2020      Chief Complaint:   RECHECK     History of Present Illness:   Francis Flores is a 55 year old female with a history of Cushing's disease who presents for follow up. She is accompanied by her son who interprets for the visit. The patient underwent transnasal resection of pituitary tumor by Dr. Crain and myself as below. I last saw the patient in 2019 for recheck, and she had some nasal pain, left greater than right. Today, she reports the right side of her nose feels normal, but she continues to have a lot of left sided nasal drainage and some pain. She was using saline spray, but has not been using it recently.     3/25/2019 Optical tracking system endoscopic resection tumor cranial (N/A) Procedure: Stealth Assisted Endoscopic Transnasal Resection Of Pituitary Tumor; Surgeon: Derrell Crain MD; Location:  OR     Review of Systems:   Pertinent items are noted in HPI or as in patient entered ROS below, remainder of complete ROS is negative.    ENT ROS 2019   Constitutional Appetite change, Unexplained fatigue   Neurology Dizzy spells, Numbness, Unexplained weakness, Headache   Eyes Visual loss, Double vision   Ears, Nose, Throat -   Cardiopulmonary Wheezing   Gastrointestinal/Genitourinary Heartburn/indigestion   Musculoskeletal Sore or stiff joints, Back pain, Neck pain, Swollen legs/feet         Physical Exam:   Ht 1.613 m (5' 3.5\")   Wt 78.9 kg (174 lb)   LMP  (LMP Unknown)   BMI 30.34 kg/m        General Assessment   The patient is alert, oriented and in no acute distress. "   Head/Face/Scalp  Grossly normal.   Nose  External nose is straight, skin is normal. Intranasal exam (anterior rhinoscopy) reveals normal moist mucosa, turbinate tissue without edema, erythema or crusting.  Septum mainly in the midline.     Procedure:  Endoscopy indicated to evaluate for pathology.   Topical anesthetic/decongestant spray applied.  Rigid scope used for visualization.  Findings: Some non-purulent mucus draining from left posterior sinus. I debrided this with suction and obtained a culture. Overall healthy appearance to nasal mucosa.      Assessment and Plan:  Pituitary adenoma (H)  - CT Facial Bones without Contrast  - Sinus Culture Aerobic Bacterial  - Gram stain    Acute sinusitis, recurrence not specified, unspecified location  - CT Facial Bones without Contrast  - Sinus Culture Aerobic Bacterial  - Gram stain     Francis Flores is a 55 year old woman here for follow up after transnasal pituitary tumor resection in treatment of Cushing's disease. Overall, her sinuses have a healthy appearance. There was some mucoid drainage from her left sinus which I sent for culture. Given her ongoing left sided pain and drainage, we discussed 3 month follow up with repeat CT imaging, I did review her most recent MRI from August 2019 and sinuses did appear clear. We will contact her with the culture results and further recommendations, antibiotics if indicated.     Follow-up: Return in about 3 months (around 4/6/2020).         Scribe Disclosure:  I, Tamara Javier, am serving as a scribe to document services personally performed by Leidy Jeong MD at this visit, based upon the provider's statements to me. All documentation has been reviewed by the aforementioned provider prior to being entered into the official medical record.   Leidy Jeong MD     The documentation recorded by the scribe accurately reflects the services I personally performed and the decisions made by me.      Again, thank you for  allowing me to participate in the care of your patient.      Sincerely,    Leidy Jeong MD

## 2020-01-06 NOTE — PROGRESS NOTES
"  Otolaryngology Clinic      Name: Francis Flores  MRN: [id number]  Age: 55 year old  : 2020      Chief Complaint:   RECHECK     History of Present Illness:   Francis Flores is a 55 year old female with a history of Cushing's disease who presents for follow up. She is accompanied by her son who interprets for the visit. The patient underwent transnasal resection of pituitary tumor by Dr. Crain and myself as below. I last saw the patient in 2019 for recheck, and she had some nasal pain, left greater than right. Today, she reports the right side of her nose feels normal, but she continues to have a lot of left sided nasal drainage and some pain. She was using saline spray, but has not been using it recently.     3/25/2019 Optical tracking system endoscopic resection tumor cranial (N/A) Procedure: Stealth Assisted Endoscopic Transnasal Resection Of Pituitary Tumor; Surgeon: Derrell Crain MD; Location:  OR     Review of Systems:   Pertinent items are noted in HPI or as in patient entered ROS below, remainder of complete ROS is negative.   [facility] ROS 2019   Constitutional Appetite change, Unexplained fatigue   Neurology Dizzy spells, Numbness, Unexplained weakness, Headache   Eyes Visual loss, Double vision   Ears, Nose, Throat -   Cardiopulmonary Wheezing   Gastrointestinal/Genitourinary Heartburn/indigestion   Musculoskeletal Sore or stiff joints, Back pain, Neck pain, Swollen legs/feet         Physical Exam:   Ht 1.613 m (5' 3.5\")   Wt 78.9 kg (174 lb)   LMP  (LMP Unknown)   BMI 30.34 kg/m       General Assessment   The patient is alert, oriented and in no acute distress.   Head/Face/Scalp  Grossly normal.   Nose  External nose is straight, skin is normal. Intranasal exam (anterior rhinoscopy) reveals normal moist mucosa, turbinate tissue without edema, erythema or crusting.  Septum mainly in the midline.     Procedure:  Endoscopy indicated to evaluate for " pathology.   Topical anesthetic/decongestant spray applied.  Rigid scope used for visualization.  Findings: Some non-purulent mucus draining from left posterior sinus. I debrided this with suction and obtained a culture. Overall healthy appearance to nasal mucosa.      Assessment and Plan:  Pituitary adenoma (H)  - CT Facial Bones without Contrast  - Sinus Culture Aerobic Bacterial  - Gram stain    Acute sinusitis, recurrence not specified, unspecified location  - CT Facial Bones without Contrast  - Sinus Culture Aerobic Bacterial  - Gram stain     Francis Flores is a 55 year old woman here for follow up after transnasal pituitary tumor resection in treatment of Cushing's disease. Overall, her sinuses have a healthy appearance. There was some mucoid drainage from her left sinus which I sent for culture. Given her ongoing left sided pain and drainage, we discussed 3 month follow up with repeat CT imaging, I did review her most recent MRI from August 2019 and sinuses did appear clear. We will contact her with the culture results and further recommendations, antibiotics if indicated.     Follow-up: Return in about 3 months (around 4/6/2020).         Scribe Disclosure:  I, Tamara Herrera, am serving as a scribe to document services personally performed by Leidy Jeong MD at this visit, based upon the provider's statements to me. All documentation has been reviewed by the aforementioned provider prior to being entered into the official medical record.   Leidy Jeong MD     The documentation recorded by the scribe accurately reflects the services I personally performed and the decisions made by me.

## 2020-01-07 ENCOUNTER — ANCILLARY PROCEDURE (OUTPATIENT)
Dept: CT IMAGING | Facility: CLINIC | Age: 55
End: 2020-01-07
Attending: OTOLARYNGOLOGY
Payer: COMMERCIAL

## 2020-01-07 DIAGNOSIS — D35.2 PITUITARY ADENOMA (H): ICD-10-CM

## 2020-01-07 DIAGNOSIS — J01.90 ACUTE SINUSITIS, RECURRENCE NOT SPECIFIED, UNSPECIFIED LOCATION: ICD-10-CM

## 2020-01-09 LAB
BACTERIA SPEC CULT: ABNORMAL
BACTERIA SPEC CULT: ABNORMAL
SPECIMEN SOURCE: ABNORMAL

## 2020-01-10 NOTE — RESULT ENCOUNTER NOTE
CT is normal. Culture shows some bacteria. Recommend a course of doxycycline 100 mg po bid for 10 days. See me in 2 months if not improved.   Please contact the patient.    Leidy Jeong MD

## 2020-01-20 DIAGNOSIS — J01.90 ACUTE SINUSITIS, RECURRENCE NOT SPECIFIED, UNSPECIFIED LOCATION: Primary | ICD-10-CM

## 2020-01-20 RX ORDER — DOXYCYCLINE HYCLATE 100 MG
100 TABLET ORAL 2 TIMES DAILY
Qty: 20 TABLET | Refills: 0 | Status: SHIPPED | OUTPATIENT
Start: 2020-01-20 | End: 2020-01-30

## 2020-01-20 NOTE — PROGRESS NOTES
Per sinus culture:   CT is normal. Culture shows some bacteria. Recommend a course of doxycycline 100 mg po bid for 10 days. See me in 2 months if not improved.

## 2020-02-17 ENCOUNTER — DOCUMENTATION ONLY (OUTPATIENT)
Dept: OTOLARYNGOLOGY | Facility: CLINIC | Age: 55
End: 2020-02-17

## 2020-02-17 NOTE — PROGRESS NOTES
"Spoke to pts daughter in regards to pts symptoms.daughter states her mother is not getting better, event after her course of antibiotics. Pt states mother is having pain in the nose, rated at an 8, she feels like there is something moving in her nose, but there is not drainage, she states when she blows her nose there is a trace of blood, per pt, but daughter has not seen this herself. She says she has a fever at night, but she has not actually taken her temperature. She made mention about a \"tumor\" and she states her face looks different. Advised pt I would discuss with provider, but it is unlikely that she would be able to see pt soon as she is booked. Pt does have an appt scheduled with provider in April for a follow up.advised daughter if symptoms are worse, and she feels her mom is not doing well, she should be seen in ER here at the , and there would be an ENT provider on call that could evaluate her. pts daughter said she would try to get her mom to do this, but wasn't sure she would cooperate.advised daughter to keep us updated and I would still discuss with provider for recommendations.pts daughter verbalized understanding.  "

## 2020-04-01 ENCOUNTER — VIRTUAL VISIT (OUTPATIENT)
Dept: ENDOCRINOLOGY | Facility: CLINIC | Age: 55
End: 2020-04-01
Payer: COMMERCIAL

## 2020-04-01 DIAGNOSIS — E03.9 HYPOTHYROIDISM, UNSPECIFIED TYPE: ICD-10-CM

## 2020-04-01 DIAGNOSIS — E24.0 PITUITARY DEPENDENT CUSHING DISEASE (H): Primary | ICD-10-CM

## 2020-04-01 DIAGNOSIS — M25.50 MULTIPLE JOINT PAIN: ICD-10-CM

## 2020-04-01 DIAGNOSIS — D49.7 PITUITARY TUMOR: ICD-10-CM

## 2020-04-01 NOTE — PROGRESS NOTES
"Francis Flores is a 55 year old female who is being evaluated via a billable telephone visit.      The patient has been notified of following:     \"This telephone visit will be conducted via a call between you and your physician/provider. We have found that certain health care needs can be provided without the need for a physical exam.  This service lets us provide the care you need with a short phone conversation.  If a prescription is necessary we can send it directly to your pharmacy.  If lab work is needed we can place an order for that and you can then stop by our lab to have the test done at a later time.    Telephone visits are billed at different rates depending on your insurance coverage. During this emergency period, for some insurers they may be billed the same as an in-person visit.  Please reach out to your insurance provider with any questions.    If during the course of the call the physician/provider feels a telephone visit is not appropriate, you will not be charged for this service.\"    Patient has given verbal consent for Telephone visit?  Yes    How would you like to obtain your AVS? Mail a copy    Phone call duration: 25 minutes      [facility]ology [facility] Follow up    Assessment and Plan:  Mrs. 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     1. Cushing's syndrome:  Mrs. 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  The patient is not on steroid replacement therapy and she feels well overall.  She lost >10 lbs since surgery.  BP is normal.   Today she has been doing well, no complaint, no HA. Good energy level. Last MRI was 8/2019, normal no obvious findings of residual tumor on the left, and right side visualized well preserved normal pituitary gland.     - No hydrocortisone    - May consider to do MRI by the end of this year  (she has an appointment " with radiology on 6/29/20 unclear what images, I will ask nurse to figure out).     - A1C    - CMP    2. Hypothyroidism: Managed well with levothyroxine 75 mcg/day. Compliance is good.     - TSH, free T4    3. Multiple joint / muscle pain:  resolved    4. Bone health: Ms. Flores is on vitamin D replacement therapy.  DXA may considered    5. Nose-nostril discomfort  Unclear this is related to previous surgery or not  She has an appointment with Dr. Jeong next week.     Total direct encounter time: 25 minutes (I used  service).     Tete Weir MD  Staff Physician  Endocrinology and Metabolism  [facility]  License: [id number]  Pager: [phone]    Interval History as of 4/1/2020 : Patient has been doing well, mentioned no complaint except left nostril discomfort. BW stable, no HA, eating well. Complaint to levothyroxine 75 mcg. Good energy level. No hydrocortisone.   HPI: Mrs. Flores is a 54 year old [place] woman that is seen today for follow-up of a ACTH secreting pituitary macroadenoma s/p endoscopic Stealth-guided transsphenoidal resection of pituitary tumor on 3/25/2019.    Today's visit was facilitated by an .  Briefly, Mrs. Flores  had a pituitary macroadenoma diagnosed in 2008. Patient has had multiple evaluations over the years and was lost to follow-up for a period of time.  She came back to our clinic in 2018, after been evaluated at [facility]. Please see my prior notes for further detail on her prior history.  As patient had confirmed excess ACTH production and a pituitary mass, she was seen by neurosurgery and had a endoscopic Stealth-guided transsphenoidal resection of pituitary tumor on 3/25/2019.  Surgery was not considered curative and she was discharged home with no steroid replacement therapy.  Post operative cortisol did not reached less than 2-3. Pathology compatible to ACTH stained adenoma with fibrous tissues.   Labs from 4/1/2019 were okay.  Subsequently the patient presented to clinic reporting nausea and vomiting that started the night prior to her visit, accompanied by abdominal pain.  No fever, no diarrhea, no dysuria. She also reported frontal headache and blood discharge from her nose.  No clear liquid coming out from her nose.  In addition to this one, she visited the ED several times and reported she was not feeling well.  She was placed on a prednisone taper, felt better initially, and has been now off steroids for a while.    She is on levothyroxine 75 mcg/day that she takes regularly.  No recent dose changes.  She denies symptoms suggestive of hypo- or hyperthyroidism.     Other than complain of pain in multiple muscles and joints (present for many years) she feels well and has no new complains today.  She states she is happy with her care and appreciates all the time we spend explaining things to her and answering her questions.  She has not yet done the dexa scan and is wondering if now it would be a good time for her to get it done.     ROS:  11 point ROS as detailed in the HPI above or negative    Medications  Current Outpatient Medications   Medication     calcium carbonate (OS- MG [place]. [place]) 500 tablet     ibuprofen 200 MG capsule     levothyroxine (SYNTHROID/LEVOTHROID) 75 MCG tablet     ondansetron (ZOFRAN) 4 MG tablet     ondansetron (ZOFRAN-ODT) 4 MG ODT tab     pantoprazole sodium 40 MG tablet     polyethylene glycol (MIRALAX/GLYCOLAX) packet     ranitidine (ZANTAC) 150 MG tablet     senna-docusate (SENOKOT-S/PERICOLACE) 8.6-50 MG tablet     sodium chloride (OCEAN) 0.65 % nasal spray     VITAMIN D, CHOLECALCIFEROL, PO     No current facility-administered medications for this visit.      Family Hx   No Family Hx of pituitary disease, asthma or osteoporosis.   Has 4 healthy children, 25 yrs old girl, 20 yrs old twin boys and 17 yrs old boy.     Social Hx   Behavioral history: No tobacco use.   Home environment: No secondhand  tobacco smoke in home.   Mrs. Flores does not work.   She lives with her  and their 4 children. Mrs. Flores lives in [place].  She does not smoke or consume alcohol.     PMH   Illnesses:   Non functioning (?) pituitary tumor as per HPI   Early ovarian failure/ incorrect age?  Osteoporosis/previous bone fracture     Surgeries:   Right knee (11/2008)   TSS 3/25/2019    Menstrual History:   Menarche at age 13   Cycles were regular   Gestae 8 Para 4 (one gemellar pregnancy)   Menopause ~14 yrs ago.     Physical Exam:  LMP  (LMP Unknown)   General : Alert, oriented X4, not distressed, no cyanosis.   Neck: No LN are palpable in the cervical area  Chest: clear to auscultation  Hearth: RRR  Abd: No masses are palpable, no pain during palpation.  Extremeties: moving all extremities, no significant peripheral edema  Psychological: appropriate mood and affect      Lab results    Labs reviewed

## 2020-04-06 ENCOUNTER — VIRTUAL VISIT (OUTPATIENT)
Dept: OTOLARYNGOLOGY | Facility: CLINIC | Age: 55
End: 2020-04-06
Payer: COMMERCIAL

## 2020-04-06 DIAGNOSIS — J01.90 ACUTE SINUSITIS, RECURRENCE NOT SPECIFIED, UNSPECIFIED LOCATION: ICD-10-CM

## 2020-04-06 DIAGNOSIS — D35.2 PITUITARY ADENOMA (H): Primary | ICD-10-CM

## 2020-04-06 NOTE — PROGRESS NOTES
Francis Flores is a 55 year old female who is being evaluated via a billable telephone visit.      Additional provider notes: This visit is conducted in conjunction with a [place]  via phone.     The patient did not answer the phone. A message was left to contact the clinic to schedule a follow up telephone visit.     Phone call duration: 0 minutes    Leidy Jeong MD

## 2020-07-16 DIAGNOSIS — E03.9 HYPOTHYROIDISM, UNSPECIFIED TYPE: ICD-10-CM

## 2020-07-21 RX ORDER — LEVOTHYROXINE SODIUM 75 UG/1
TABLET ORAL
Qty: 90 TABLET | Refills: 1 | Status: SHIPPED | OUTPATIENT
Start: 2020-07-21 | End: 2021-08-11

## 2020-07-21 NOTE — TELEPHONE ENCOUNTER
levothyroxine (SYNTHROID/LEVOTHROID) 75 MCG tablet     Last Written Prescription Date:  5/30/2019  Last Fill Quantity: 90,   # refills: 3  Last Office Visit : 4/1/20>Hypothyroidism: Managed well with levothyroxine 75 mcg/day. Compliance is good.      - TSH, free T4  Future Office visit:  None    Routing refill request to provider for review/approval because:  Labs due and ordered 4/1/20 07/24/19  1020    TSH 0.24     Care everywhere reviewed.

## 2020-07-29 ENCOUNTER — APPOINTMENT (OUTPATIENT)
Dept: CT IMAGING | Facility: CLINIC | Age: 55
End: 2020-07-29
Attending: EMERGENCY MEDICINE
Payer: COMMERCIAL

## 2020-07-29 ENCOUNTER — HOSPITAL ENCOUNTER (EMERGENCY)
Facility: CLINIC | Age: 55
Discharge: HOME | End: 2020-07-29
Attending: EMERGENCY MEDICINE | Admitting: EMERGENCY MEDICINE
Payer: COMMERCIAL

## 2020-07-29 VITALS
HEART RATE: 60 BPM | OXYGEN SATURATION: 99 % | HEIGHT: 64 IN | BODY MASS INDEX: 29.87 KG/M2 | TEMPERATURE: 97.9 F | SYSTOLIC BLOOD PRESSURE: 100 MMHG | DIASTOLIC BLOOD PRESSURE: 69 MMHG | RESPIRATION RATE: 18 BRPM

## 2020-07-29 DIAGNOSIS — S06.0X0A CONCUSSION WITHOUT LOSS OF CONSCIOUSNESS, INITIAL ENCOUNTER: ICD-10-CM

## 2020-07-29 LAB
ALBUMIN UR-MCNC: NEGATIVE MG/DL
ANION GAP SERPL CALCULATED.3IONS-SCNC: <1 MMOL/L (ref 3–14)
APPEARANCE UR: CLEAR
BILIRUB UR QL STRIP: NEGATIVE
BUN SERPL-MCNC: 10 MG/DL (ref 7–30)
CALCIUM SERPL-MCNC: 9.4 MG/DL (ref 8.5–10.1)
CHLORIDE SERPL-SCNC: 109 MMOL/L (ref 94–109)
CO2 SERPL-SCNC: 30 MMOL/L (ref 20–32)
COLOR UR AUTO: ABNORMAL
CREAT SERPL-MCNC: 0.75 MG/DL (ref 0.52–1.04)
GFR SERPL CREATININE-BSD FRML MDRD: 89 ML/MIN/{1.73_M2}
GLUCOSE SERPL-MCNC: 88 MG/DL (ref 70–99)
GLUCOSE UR STRIP-MCNC: NEGATIVE MG/DL
HGB UR QL STRIP: NEGATIVE
KETONES UR STRIP-MCNC: NEGATIVE MG/DL
LEUKOCYTE ESTERASE UR QL STRIP: NEGATIVE
MUCOUS THREADS #/AREA URNS LPF: PRESENT /LPF
NITRATE UR QL: NEGATIVE
PH UR STRIP: 7 PH (ref 5–7)
POTASSIUM SERPL-SCNC: 3.9 MMOL/L (ref 3.4–5.3)
RBC #/AREA URNS AUTO: 0 /HPF (ref 0–2)
SODIUM SERPL-SCNC: 140 MMOL/L (ref 133–144)
SOURCE: ABNORMAL
SP GR UR STRIP: 1 (ref 1–1.03)
UROBILINOGEN UR STRIP-MCNC: NORMAL MG/DL (ref 0–2)
WBC #/AREA URNS AUTO: <1 /HPF (ref 0–5)

## 2020-07-29 PROCEDURE — 96374 THER/PROPH/DIAG INJ IV PUSH: CPT | Performed by: EMERGENCY MEDICINE

## 2020-07-29 PROCEDURE — 99285 EMERGENCY DEPT VISIT HI MDM: CPT | Mod: 25 | Performed by: EMERGENCY MEDICINE

## 2020-07-29 PROCEDURE — 80048 BASIC METABOLIC PNL TOTAL CA: CPT | Performed by: EMERGENCY MEDICINE

## 2020-07-29 PROCEDURE — 25800030 ZZH RX IP 258 OP 636: Performed by: EMERGENCY MEDICINE

## 2020-07-29 PROCEDURE — 25000128 H RX IP 250 OP 636: Performed by: EMERGENCY MEDICINE

## 2020-07-29 PROCEDURE — 96375 TX/PRO/DX INJ NEW DRUG ADDON: CPT | Performed by: EMERGENCY MEDICINE

## 2020-07-29 PROCEDURE — 96361 HYDRATE IV INFUSION ADD-ON: CPT | Performed by: EMERGENCY MEDICINE

## 2020-07-29 PROCEDURE — 70450 CT HEAD/BRAIN W/O DYE: CPT

## 2020-07-29 PROCEDURE — 99284 EMERGENCY DEPT VISIT MOD MDM: CPT | Mod: Z6 | Performed by: EMERGENCY MEDICINE

## 2020-07-29 PROCEDURE — 81001 URINALYSIS AUTO W/SCOPE: CPT | Performed by: EMERGENCY MEDICINE

## 2020-07-29 RX ORDER — KETOROLAC TROMETHAMINE 30 MG/ML
30 INJECTION, SOLUTION INTRAMUSCULAR; INTRAVENOUS ONCE
Status: COMPLETED | OUTPATIENT
Start: 2020-07-29 | End: 2020-07-29

## 2020-07-29 RX ORDER — ONDANSETRON 2 MG/ML
4 INJECTION INTRAMUSCULAR; INTRAVENOUS ONCE
Status: COMPLETED | OUTPATIENT
Start: 2020-07-29 | End: 2020-07-29

## 2020-07-29 RX ORDER — ONDANSETRON 4 MG/1
4 TABLET, ORALLY DISINTEGRATING ORAL EVERY 6 HOURS PRN
Qty: 10 TABLET | Refills: 0 | Status: SHIPPED | OUTPATIENT
Start: 2020-07-29 | End: 2020-08-02

## 2020-07-29 RX ORDER — PROPARACAINE HYDROCHLORIDE 5 MG/ML
1 SOLUTION/ DROPS OPHTHALMIC ONCE
Status: DISCONTINUED | OUTPATIENT
Start: 2020-07-29 | End: 2020-07-29

## 2020-07-29 RX ADMIN — KETOROLAC TROMETHAMINE 30 MG: 30 INJECTION, SOLUTION INTRAMUSCULAR at 18:03

## 2020-07-29 RX ADMIN — ONDANSETRON 4 MG: 2 INJECTION INTRAMUSCULAR; INTRAVENOUS at 18:03

## 2020-07-29 RX ADMIN — SODIUM CHLORIDE 1000 ML: 9 INJECTION, SOLUTION INTRAVENOUS at 18:01

## 2020-07-29 ASSESSMENT — ENCOUNTER SYMPTOMS
VOMITING: 1
SHORTNESS OF BREATH: 0
ARTHRALGIAS: 0
ABDOMINAL PAIN: 0
NAUSEA: 1
DIFFICULTY URINATING: 0
FEVER: 0
NECK STIFFNESS: 0
CONFUSION: 0
EYE REDNESS: 0
COLOR CHANGE: 0
HEADACHES: 1

## 2020-07-29 NOTE — ED PROVIDER NOTES
[facility] ([facility])  July 29, 2020  History     Chief Complaint   Patient presents with     Headache     Vomiting     HPI  Francis Flores is a 55 year old female with a medical history of pituitary adenoma, Cushing's disease, ureterolithiasis, osteoporosis, GERD and benign positional vertigo who presents to the ED for evaluation of headache and vomiting. The patient reports that she slipped and fell 2 days ago when bending over at home trying to pick something up.  She struck her head but did not suffer loss of consciousness.  She had a headache immediately thereafter.  It worsened yesterday and she developed nausea and vomiting.  She vomited several times today.  Patient denies any neck pain.  No weakness or numbness.  Patient denies any incoordination.  No gait disturbance.  Patient denies any preceding illness.  She specifically denies any headache preceding the fall on repeated questioning.  No fever, cough, or shortness of breath.  She denies any chest pain.  She denies any abdominal pain.  She denies any other injury.  She denies any anticoagulant use.    PAST MEDICAL HISTORY:   Past Medical History:   Diagnosis Date     Benign positional vertigo      GERD (gastroesophageal reflux disease)      Head injury      Osteoporosis      Pituitary adenoma (H) 4/13/2012     Sleep apnea      Thyroid disease        PAST SURGICAL HISTORY:   Past Surgical History:   Procedure Laterality Date     CYSTOSCOPY BLADDER WITH STONE EXTRACTION Right      IR CAROTID CEREBRAL ANGIOGRAM BILATERAL  11/16/2018     OPTICAL TRACKING SYSTEM ENDOSCOPIC RESECTION TUMOR CRANIAL N/A 3/25/2019    Procedure: Stealth Assisted Endoscopic Transnasal Resection Of Pituitary Tumor;  Surgeon: Derrell Crain MD;  Location: [facility] OR     ORTHOPEDIC SURGERY      knee       Past medical history, past surgical history, medications, and allergies were reviewed with the patient. Additional pertinent items:  None    FAMILY HISTORY:   Family History   Problem Relation Age of Onset     No Known Problems Mother      No Known Problems Father          of old age     Glaucoma No family hx of      Macular Degeneration No family hx of        SOCIAL HISTORY:   Social History     Tobacco Use     Smoking status: Never Smoker     Smokeless tobacco: Never Used   Substance Use Topics     Alcohol use: No     Frequency: Never     Social history was reviewed with the patient. Additional pertinent items: None      Discharge Medication List as of 2020  8:26 PM      CONTINUE these medications which have CHANGED    Details   ondansetron (ZOFRAN ODT) 4 MG ODT tab Take 1 tablet (4 mg) by mouth every 6 hours as needed for nausea, Disp-10 tablet,R-0, E-Prescribe         CONTINUE these medications which have NOT CHANGED    Details   calcium carbonate (OS- MG [place]. [place]) 500 tablet Take 1 tablet by mouth 2 times daily, Historical      ibuprofen 200 MG capsule Take 400 mg by mouth every 4 hours as needed for fever, Disp-60 capsule, R-3, E-PrescribeMaximum 600 mg every 8 hours      levothyroxine (SYNTHROID/LEVOTHROID) 75 MCG tablet TAKE ONE TABLET BY MOUTH ONCE DAILY, Disp-90 tablet,R-1, E-Prescribe      ondansetron (ZOFRAN) 4 MG tablet Take 1 tablet (4 mg) by mouth every 8 hours as needed for nausea, Disp-6 tablet, R-0, Local Print      pantoprazole sodium 40 MG tablet Take 40 mg by mouth daily , Historical      polyethylene glycol (MIRALAX/GLYCOLAX) packet Take 17 g by mouth 2 times daily, Disp-30 packet, R-0, E-Prescribe      ranitidine (ZANTAC) 150 MG tablet Take 1 tablet by mouth 2 times daily., 1 tablet, Oral, 2 TIMES DAILY, Until Discontinued, Historical      senna-docusate (SENOKOT-S/PERICOLACE) 8.6-50 MG tablet Take 3 tablets by mouth 2 times daily, Disp-30 tablet, R-0, E-Prescribe      sodium chloride (OCEAN) 0.65 % nasal spray Spray 2 sprays into both nostrils every 2 hours as needed for congestion, Disp-50 mL, R-0,  "E-Prescribe      VITAMIN D, CHOLECALCIFEROL, PO Take 2,000 Units by mouth daily, Historical              No Known Allergies     Review of Systems   Constitutional: Negative for fever.   HENT: Negative for congestion.    Eyes: Negative for redness.   Respiratory: Negative for shortness of breath.    Cardiovascular: Negative for chest pain.   Gastrointestinal: Positive for nausea and vomiting. Negative for abdominal pain.   Genitourinary: Negative for difficulty urinating.   Musculoskeletal: Negative for arthralgias and neck stiffness.   Skin: Negative for color change.   Neurological: Positive for headaches.   Psychiatric/Behavioral: Negative for confusion.   All other systems reviewed and are negative.    A complete review of systems was performed with pertinent positives and negatives noted in the HPI, and all other systems negative.    Physical Exam   BP: 131/78  Pulse: 60  Heart Rate: 68  Temp: 97.9  F (36.6  C)  Resp: 18  Height: 162.6 cm (5' 4\")  SpO2: 100 %      Physical Exam  Vitals signs and nursing note reviewed.   Constitutional:       General: She is not in acute distress.     Appearance: Normal appearance. She is not diaphoretic.   HENT:      Head: Normocephalic and atraumatic.      Mouth/Throat:      Pharynx: No oropharyngeal exudate.   Eyes:      General: No scleral icterus.     Pupils: Pupils are equal, round, and reactive to light.   Neck:      Musculoskeletal: Normal range of motion.   Cardiovascular:      Rate and Rhythm: Normal rate.      Pulses: Normal pulses.   Pulmonary:      Effort: Pulmonary effort is normal. No respiratory distress.   Chest:      Chest wall: No tenderness.   Abdominal:      Palpations: Abdomen is soft.      Tenderness: There is no abdominal tenderness.   Musculoskeletal: Normal range of motion.         General: No tenderness.      Cervical back: She exhibits no tenderness.      Thoracic back: She exhibits no tenderness.      Lumbar back: She exhibits no tenderness.   Skin:   "   General: Skin is warm.      Findings: No abrasion, laceration or rash.   Neurological:      Mental Status: She is alert and oriented to person, place, and time.      Cranial Nerves: No cranial nerve deficit.      Motor: No weakness.      Coordination: Coordination normal.         ED Course        Procedures              Results for orders placed or performed during the hospital encounter of 07/29/20   CT Head w/o Contrast     Status: None    Narrative    CT HEAD W/O CONTRAST 7/29/2020 7:40 PM    History: Headache, vomiting, trauma     Comparison: MRI 8/1/2019    Technique: Using multidetector thin collimation helical acquisition  technique, axial, coronal and sagittal CT images from the skull base  to the vertex were obtained without intravenous contrast.    Findings: Postoperative changes of transnasal transsphenoidal  endoscopic resection of pituitary mass. Tiny focus of air in the  surgical bed.  There is no intracranial hemorrhage, mass effect, or  midline shift. Gray/white matter differentiation in both cerebral  hemispheres is preserved. Ventricles are proportionate to the cerebral  sulci. The basal cisterns are clear. Nonspecific areas of  hypoattenuation in the subcortical and deep white matter, likely  represent chronic small vessel ischemic disease.    The bony calvaria and the bones of the skull base are normal. The  visualized portions of the paranasal sinuses and mastoid air cells are  clear.       Impression    Impression:  1. No acute intracranial pathology.   2. Postsurgical changes of pituitary mass resection.  2. Changes of chronic small vessel ischemic disease.    I have personally reviewed the examination and initial interpretation  and I agree with the findings.    JUNE PINTO MD   Basic metabolic panel     Status: Abnormal   Result Value Ref Range    Sodium 140 133 - 144 mmol/L    Potassium 3.9 3.4 - 5.3 mmol/L    Chloride 109 94 - 109 mmol/L    Carbon Dioxide 30 20 - 32 mmol/L    Anion  Gap <1 (L) 3 - 14 mmol/L    Glucose 88 70 - 99 mg/dL    Urea Nitrogen 10 7 - 30 mg/dL    Creatinine 0.75 0.52 - 1.04 mg/dL    GFR Estimate 89 >60 mL/min/[1.73_m2]    GFR Estimate If Black >90 >60 mL/min/[1.73_m2]    Calcium 9.4 8.5 - 10.1 mg/dL   UA with Microscopic     Status: Abnormal   Result Value Ref Range    Color Urine Light Yellow     Appearance Urine Clear     Glucose Urine Negative NEG^Negative mg/dL    Bilirubin Urine Negative NEG^Negative    Ketones Urine Negative NEG^Negative mg/dL    Specific Gravity Urine 1.002 (L) 1.003 - 1.035    Blood Urine Negative NEG^Negative    pH Urine 7.0 5.0 - 7.0 pH    Protein Albumin Urine Negative NEG^Negative mg/dL    Urobilinogen mg/dL Normal 0.0 - 2.0 mg/dL    Nitrite Urine Negative NEG^Negative    Leukocyte Esterase Urine Negative NEG^Negative    Source Urine     WBC Urine <1 0 - 5 /HPF    RBC Urine 0 0 - 2 /HPF    Mucous Urine Present (A) NEG^Negative /LPF        Results for orders placed or performed during the hospital encounter of 07/29/20 (from the past 24 hour(s))   Basic metabolic panel   Result Value Ref Range    Sodium 140 133 - 144 mmol/L    Potassium 3.9 3.4 - 5.3 mmol/L    Chloride 109 94 - 109 mmol/L    Carbon Dioxide 30 20 - 32 mmol/L    Anion Gap <1 (L) 3 - 14 mmol/L    Glucose 88 70 - 99 mg/dL    Urea Nitrogen 10 7 - 30 mg/dL    Creatinine 0.75 0.52 - 1.04 mg/dL    GFR Estimate 89 >60 mL/min/[1.73_m2]    GFR Estimate If Black >90 >60 mL/min/[1.73_m2]    Calcium 9.4 8.5 - 10.1 mg/dL   UA with Microscopic   Result Value Ref Range    Color Urine Light Yellow     Appearance Urine Clear     Glucose Urine Negative NEG^Negative mg/dL    Bilirubin Urine Negative NEG^Negative    Ketones Urine Negative NEG^Negative mg/dL    Specific Gravity Urine 1.002 (L) 1.003 - 1.035    Blood Urine Negative NEG^Negative    pH Urine 7.0 5.0 - 7.0 pH    Protein Albumin Urine Negative NEG^Negative mg/dL    Urobilinogen mg/dL Normal 0.0 - 2.0 mg/dL    Nitrite Urine Negative  NEG^Negative    Leukocyte Esterase Urine Negative NEG^Negative    Source Urine     WBC Urine <1 0 - 5 /HPF    RBC Urine 0 0 - 2 /HPF    Mucous Urine Present (A) NEG^Negative /LPF   CT Head w/o Contrast    Narrative    CT HEAD W/O CONTRAST 7/29/2020 7:40 PM    History: Headache, vomiting, trauma     Comparison: MRI 8/1/2019    Technique: Using multidetector thin collimation helical acquisition  technique, axial, coronal and sagittal CT images from the skull base  to the vertex were obtained without intravenous contrast.    Findings: Postoperative changes of transnasal transsphenoidal  endoscopic resection of pituitary mass. Tiny focus of air in the  surgical bed.  There is no intracranial hemorrhage, mass effect, or  midline shift. Gray/white matter differentiation in both cerebral  hemispheres is preserved. Ventricles are proportionate to the cerebral  sulci. The basal cisterns are clear. Nonspecific areas of  hypoattenuation in the subcortical and deep white matter, likely  represent chronic small vessel ischemic disease.    The bony calvaria and the bones of the skull base are normal. The  visualized portions of the paranasal sinuses and mastoid air cells are  clear.       Impression    Impression:  1. No acute intracranial pathology.   2. Postsurgical changes of pituitary mass resection.  2. Changes of chronic small vessel ischemic disease.    I have personally reviewed the examination and initial interpretation  and I agree with the findings.    JUNE PINTO MD     Medications   0.9% sodium chloride BOLUS (0 mLs Intravenous Stopped 7/29/20 2001)   ketorolac (TORADOL) injection 30 mg (30 mg Intravenous Given 7/29/20 1803)   ondansetron (ZOFRAN) injection 4 mg (4 mg Intravenous Given 7/29/20 1803)      Feeling better after above therapies.       Assessments & Plan (with Medical Decision Making)   55 year old female to the emergency department for evaluation of headache and vomiting after a slip and fall with  resultant closed head injury.  The patient has normal vital signs and appears clinically well here in the emergency department.  She has a normal neurologic examination.  Her neck is nontender and she has full range of motion.  Head CT does not reveal any acute intracranial pathology.  She does have postsurgical changes a pituitary mass resection.  Patient felt markedly improved after Toradol and Zofran.  She will be discharged home with prescription for ondansetron.  She will utilize ibuprofen for pain.  She will utilize acetaminophen for breakthrough pain.  Concussion precautions given including avoidance of screens if they exacerbate her symptoms.  Primary care follow-up recommended in 1 week for recheck.    I have reviewed the nursing notes.    I have reviewed the findings, diagnosis, plan and need for follow up with the patient.    Discharge Medication List as of 7/29/2020  8:26 PM          Final diagnoses:   Concussion without loss of consciousness, initial encounter       7/29/2020   [facility], [place], EMERGENCY DEPARTMENT     Ford Sheridan MD  07/29/20 [id number]

## 2020-07-29 NOTE — ED AVS SNAPSHOT
[facility], [place], Emergency Department  [address] [place] [address]  Phone:  [phone]                                    Francis Flores   MRN: [id number]    Department:  [facility], Emergency Department   Date of Visit:  7/29/2020           After Visit Summary Signature Page    I have received my discharge instructions, and my questions have been answered. I have discussed any challenges I see with this plan with the nurse or doctor.    ..........................................................................................................................................  Patient/Patient Representative Signature      ..........................................................................................................................................  Patient Representative Print Name and Relationship to Patient    ..................................................               ................................................  Date                                   Time    ..........................................................................................................................................  Reviewed by Signature/Title    ...................................................              ..............................................  Date                                               Time          22EPIC Rev 08/18

## 2020-07-29 NOTE — ED TRIAGE NOTES
Pt reports headache x3 days, 2 days ago her headache was so bad that she fell and hit her head where she previously had a tumor removed in 2018. Reports headache with bilateral eye pain, denies vision changes, began vomiting this morning. Denies LOC and neck pain.

## 2020-07-30 NOTE — DISCHARGE INSTRUCTIONS
Take ibuprofen 600 mg every 6 hours with food.  You may also take acetaminophen in between doses of ibuprofen.  Take ondansetron as needed for nausea.    Follow-up with your primary care provider in 5 to 7 days.    Return if worsening symptoms or other concerns.

## 2021-02-01 ENCOUNTER — TELEPHONE (OUTPATIENT)
Dept: NEUROSURGERY | Facility: CLINIC | Age: 56
End: 2021-02-01

## 2021-02-01 NOTE — TELEPHONE ENCOUNTER
Routed to Neurosurgery Clinic Scheduling for patient to be seen by Liya Palma.   Last seen be Dr. Crain in 2019.    Salena Bo LPN  Neurosurgery

## 2021-02-01 NOTE — TELEPHONE ENCOUNTER
[facility] Call Center    Phone Message    May a detailed message be left on voicemail: yes     Reason for Call: Symptoms or Concerns     If patient has red-flag symptoms, warm transfer to triage line    Current symptom or concern: Patients daughter is calling on behalf of patient states that patient has been experiencing headaches in the past month and wanted to schedule an appointment with Dr. Crain. Last seen in 2019 and requesting to be advised on next steps. Please call to advise.    Symptoms have been present for:  1 month(s)      Travel Screening: Not Applicable

## 2021-02-08 ENCOUNTER — VIRTUAL VISIT (OUTPATIENT)
Dept: NEUROSURGERY | Facility: CLINIC | Age: 56
End: 2021-02-08
Payer: COMMERCIAL

## 2021-02-08 ENCOUNTER — TELEPHONE (OUTPATIENT)
Dept: NEUROSURGERY | Facility: CLINIC | Age: 56
End: 2021-02-08

## 2021-02-08 DIAGNOSIS — R51.9 NONINTRACTABLE HEADACHE, UNSPECIFIED CHRONICITY PATTERN, UNSPECIFIED HEADACHE TYPE: ICD-10-CM

## 2021-02-08 DIAGNOSIS — D35.2 PITUITARY ADENOMA (H): Primary | ICD-10-CM

## 2021-02-08 DIAGNOSIS — E24.0 CUSHING'S DISEASE (H): ICD-10-CM

## 2021-02-08 PROCEDURE — 99213 OFFICE O/P EST LOW 20 MIN: CPT | Mod: 95 | Performed by: PHYSICIAN ASSISTANT

## 2021-02-08 RX ORDER — TIZANIDINE 2 MG/1
1 TABLET ORAL EVERY 8 HOURS PRN
COMMUNITY
Start: 2020-08-01

## 2021-02-08 RX ORDER — MINERAL OIL/HYDROPHIL PETROLAT
1 OINTMENT (GRAM) TOPICAL DAILY PRN
COMMUNITY
Start: 2020-12-11

## 2021-02-08 RX ORDER — CHOLECALCIFEROL (VITAMIN D3) 1250 MCG
1 CAPSULE ORAL WEEKLY
COMMUNITY
Start: 2021-02-05

## 2021-02-08 RX ORDER — LANOLIN ALCOHOL/MO/W.PET/CERES
1000 CREAM (GRAM) TOPICAL DAILY
COMMUNITY
Start: 2021-02-05

## 2021-02-08 RX ORDER — CETIRIZINE HYDROCHLORIDE 10 MG/1
10 TABLET ORAL DAILY
COMMUNITY
Start: 2021-02-05

## 2021-02-08 RX ORDER — LINACLOTIDE 145 UG/1
1 CAPSULE, GELATIN COATED ORAL DAILY
COMMUNITY
Start: 2020-08-01

## 2021-02-08 RX ORDER — LEVOCETIRIZINE DIHYDROCHLORIDE 5 MG/1
1-2 TABLET, FILM COATED ORAL DAILY
COMMUNITY
Start: 2021-02-05

## 2021-02-08 RX ORDER — MULTIVITAMIN/IRON/FOLIC ACID 18MG-0.4MG
1 TABLET ORAL DAILY
COMMUNITY
Start: 2020-08-01

## 2021-02-08 RX ORDER — CYCLOBENZAPRINE HCL 10 MG
10 TABLET ORAL DAILY PRN
COMMUNITY
Start: 2020-10-09 | End: 2021-10-09

## 2021-02-08 RX ORDER — MELOXICAM 15 MG/1
15 TABLET ORAL DAILY
COMMUNITY
Start: 2020-08-01

## 2021-02-08 RX ORDER — NAPROXEN 500 MG/1
500 TABLET ORAL 2 TIMES DAILY
COMMUNITY
Start: 2020-10-09

## 2021-02-08 NOTE — PATIENT INSTRUCTIONS
Schedule a brain MRI to be done as soon as possible.  I will call you with next steps once I have those results back.

## 2021-02-08 NOTE — TELEPHONE ENCOUNTER
Saturday 9am 2/13 [facility] MRI arrival time 0830.  [id number] [address] .  Spoke with patient thru  given information.  Patient voiced understanding, well aware of [facility] building and will arrive at 0830 on 2/13 for MRI at 9am.

## 2021-02-08 NOTE — LETTER
"2021       RE: Francis Flores  [address]  [facility] [address]     Dear Colleague,    Thank you for referring your patient, Francis Flores, to the [facility] NEUROSURGERY CLINIC [place] at [facility]. Please see a copy of my visit note below.      [facility]      Name: Francis Flores  MRN: [id number]  Age: 56 year old  : 2021      Chief Complaint:   Pituitary adenoma s/p transnasal endoscopic approach for resection 3/25/2019, follow up; Headaches    History of Present Illness:   Francis Flores is a pleasant 56 year old female with a history of a pituitary adenoma causing Cushing's disease, s/p resection of this tumor 3/2019 by Dr. Crain, here by telephone visit for follow up. This visit was accomplished with assistance of a [employer] . The patient's last follow up with Neurosurgery was 2019 at which time she was doing well. She reports today that she's had increasing headaches the past 1-2 months. These are \"all over\" her head and occur daily. She also notes new blurry vision in both eyes, left worse than right, in the past one month. She denies double vision. She continues to feel generally fatigued and have joint pain. She denies new fevers, dizziness, vertigo, nausea, vomiting, unintentional weight gain or loss, paresthesias, or weakness.    She last saw Dr. Weir 2020 but it appears the lab work ordered was never drawn.       Review of Systems:   Pertinent items are noted in HPI or as in patient entered ROS below, remainder of complete ROS is negative.      Physical Exam limited by telephone visit today:  Neuro: The patient is fully oriented and quite pleasant. Speech is normal.  Psych: Normal mood and affect. Behavior is normal.      Imaging:  No new imaging to review today     Assessment:  1. Pituitary adenoma s/p transnasal endoscopic approach for resection " 3/25/2019, follow up  2. Worsening headaches and blurry vision    Plan:  Will plan to get a pituitary MRI done as soon as possible, with follow up by phone regarding results and plan going forward.        Kristina Arriaza PA-C    Addendum:  MRI results reviewed with the patient via  and also with Dr. Crain, no evidence of growth or recurrence of her pituitary adenoma. We will have her get a set of pituitary labs done and forward these to Dr. Weir. I also asked that she see Ophthalmology for an exam. She is interested in headache management and workup, so referral will be placed to Neurology for this.  Repeat MRI in 1 year.   Kristina Arriaza PA-C        Francis is a 56 year old who is being evaluated via a billable telephone visit.      What phone number would you like to be contacted at? [phone]  How would you like to obtain your AVS? Mail a copy  Phone call duration: 25 minutes    Again, thank you for allowing me to participate in the care of your patient.      Sincerely,    Kristina Arriaza PA-C

## 2021-02-08 NOTE — PROGRESS NOTES
"  [facility]      Name: Francis Flores  MRN: [id number]  Age: 56 year old  : 2021      Chief Complaint:   Pituitary adenoma s/p transnasal endoscopic approach for resection 3/25/2019, follow up; Headaches    History of Present Illness:   Francis Flores is a pleasant 56 year old female with a history of a pituitary adenoma causing Cushing's disease, s/p resection of this tumor 3/2019 by Dr. Crain, here by telephone visit for follow up. This visit was accomplished with assistance of a [place] . The patient's last follow up with Neurosurgery was 2019 at which time she was doing well. She reports today that she's had increasing headaches the past 1-2 months. These are \"all over\" her head and occur daily. She also notes new blurry vision in both eyes, left worse than right, in the past one month. She denies double vision. She continues to feel generally fatigued and have joint pain. She denies new fevers, dizziness, vertigo, nausea, vomiting, unintentional weight gain or loss, paresthesias, or weakness.    She last saw Dr. Weir 2020 but it appears the lab work ordered was never drawn.       Review of Systems:   Pertinent items are noted in HPI or as in patient entered ROS below, remainder of complete ROS is negative.      Physical Exam limited by telephone visit today:  Neuro: The patient is fully oriented and quite pleasant. Speech is normal.  Psych: Normal mood and affect. Behavior is normal.      Imaging:  No new imaging to review today     Assessment:  1. Pituitary adenoma s/p transnasal endoscopic approach for resection 3/25/2019, follow up  2. Worsening headaches and blurry vision    Plan:  Will plan to get a pituitary MRI done as soon as possible, with follow up by phone regarding results and plan going forward.        Kristina Arriaza PA-C    Addendum:  MRI results reviewed with the patient via  and also with Dr. Crain, no evidence " of growth or recurrence of her pituitary adenoma. We will have her get a set of pituitary labs done and forward these to Dr. Weir. I also asked that she see Ophthalmology for an exam. She is interested in headache management and workup, so referral will be placed to Neurology for this.  Repeat MRI in 1 year.   Kristina Arriaza PA-C        Francis is a 56 year old who is being evaluated via a billable telephone visit.      What phone number would you like to be contacted at? [phone]  How would you like to obtain your AVS? Mail a copy  Phone call duration: 25 minutes

## 2021-02-13 ENCOUNTER — ANCILLARY PROCEDURE (OUTPATIENT)
Dept: MRI IMAGING | Facility: CLINIC | Age: 56
End: 2021-02-13
Attending: PHYSICIAN ASSISTANT
Payer: COMMERCIAL

## 2021-02-13 DIAGNOSIS — E24.0 CUSHING'S DISEASE (H): ICD-10-CM

## 2021-02-13 PROCEDURE — A9585 GADOBUTROL INJECTION: HCPCS | Performed by: RADIOLOGY

## 2021-02-13 PROCEDURE — 70543 MRI ORBT/FAC/NCK W/O &W/DYE: CPT | Performed by: RADIOLOGY

## 2021-02-13 RX ORDER — GADOBUTROL 604.72 MG/ML
7.5 INJECTION INTRAVENOUS ONCE
Status: COMPLETED | OUTPATIENT
Start: 2021-02-13 | End: 2021-02-13

## 2021-02-13 RX ADMIN — GADOBUTROL 7.5 ML: 604.72 INJECTION INTRAVENOUS at 09:06

## 2021-02-17 ENCOUNTER — TELEPHONE (OUTPATIENT)
Dept: NEUROSURGERY | Facility: CLINIC | Age: 56
End: 2021-02-17

## 2021-02-17 ENCOUNTER — APPOINTMENT (OUTPATIENT)
Dept: INTERPRETER SERVICES | Facility: CLINIC | Age: 56
End: 2021-02-17

## 2021-02-17 NOTE — TELEPHONE ENCOUNTER
[facility] Call Center    Phone Message    May a detailed message be left on voicemail: yes     Reason for Call: Other: Pt calling to request that the lab appointment and headache provider appointment be on the same day.  She requested that the lab appointment be cancelled for now until it be coordinated with the provider.  Please review the request and then call Francis with an  to discuss     Action Taken: Message routed to:  [facility] ([facility]):  Neurosurgery    Travel Screening: Not Applicable

## 2021-02-18 ENCOUNTER — TELEPHONE (OUTPATIENT)
Dept: OPHTHALMOLOGY | Facility: CLINIC | Age: 56
End: 2021-02-18

## 2021-02-18 NOTE — TELEPHONE ENCOUNTER
H/o pituitary adenoma    Vision changes noted per virtual visit with neurology    Referred to ophthalmology     Spoke to pt at 1530    Pt states left eye more than right eye decrease vision    Reading vision more blurred in past month    Distance vision been good-- no changes    H/o longstanding eye pain left eye more than right eye-- times years and not worsening per pt    MRI 2---- : Stable appearance of the residual pituitary gland after  transsphenoidal resection, without evidence to suggest  recurrent/residual tumor or new lesion.      Offered march 10th appt with neuro-ophthalmology for exam vs. Earlier with general/optometry     Pt ok with march 10th and aware to contact clinic for any worsening symptoms-- vision changes, especially if distance seems to have changed    Pt verbally demonstrated understanding    Note to  to send new pt packet    Shawn Jones RN 3:53 PM 02/18/21

## 2021-03-04 ENCOUNTER — TELEPHONE (OUTPATIENT)
Dept: NEUROSURGERY | Facility: CLINIC | Age: 56
End: 2021-03-04

## 2021-03-04 NOTE — TELEPHONE ENCOUNTER
"[facility] Call Center    Phone Message    May a detailed message be left on voicemail: yes     Reason for Call: Other: Please have Nurse Anibal call pt's daughter, Slim: regarding a form/letter that is needed in order for pt to keep her appt on 3/10/21 with our eye clinic. Please call Slim for more details having to do with \"who referred pt to eye clinic, etc.\"  Thank you.    Action Taken: Message routed to:  [facility] ([facility]): [facility]    Travel Screening: Not Applicable                                                                      "

## 2021-03-05 NOTE — TELEPHONE ENCOUNTER
Informed that patient was referred to ophthalmology by CHETNA Arriaza. No further action needed at this time. Bethany Varner RN 3/5/2021 1:04 PM

## 2021-03-10 ENCOUNTER — OFFICE VISIT (OUTPATIENT)
Dept: OPHTHALMOLOGY | Facility: CLINIC | Age: 56
End: 2021-03-10
Attending: OPHTHALMOLOGY
Payer: COMMERCIAL

## 2021-03-10 DIAGNOSIS — D35.2 PITUITARY ADENOMA (H): ICD-10-CM

## 2021-03-10 DIAGNOSIS — E24.0 CUSHING'S DISEASE (H): ICD-10-CM

## 2021-03-10 DIAGNOSIS — H53.40 VISUAL FIELD DEFECT: ICD-10-CM

## 2021-03-10 DIAGNOSIS — H53.10 SUBJECTIVE VISUAL DISTURBANCE: Primary | ICD-10-CM

## 2021-03-10 PROCEDURE — 92083 EXTENDED VISUAL FIELD XM: CPT | Performed by: OPHTHALMOLOGY

## 2021-03-10 PROCEDURE — 92004 COMPRE OPH EXAM NEW PT 1/>: CPT | Mod: GC | Performed by: OPHTHALMOLOGY

## 2021-03-10 PROCEDURE — 92015 DETERMINE REFRACTIVE STATE: CPT | Performed by: TECHNICIAN/TECHNOLOGIST

## 2021-03-10 PROCEDURE — 92133 CPTRZD OPH DX IMG PST SGM ON: CPT | Performed by: OPHTHALMOLOGY

## 2021-03-10 PROCEDURE — G0463 HOSPITAL OUTPT CLINIC VISIT: HCPCS | Performed by: TECHNICIAN/TECHNOLOGIST

## 2021-03-10 ASSESSMENT — SLIT LAMP EXAM - LIDS
COMMENTS: 1+ BLEPHARITIS
COMMENTS: 1+ BLEPHARITIS

## 2021-03-10 ASSESSMENT — REFRACTION_WEARINGRX
OS_SPHERE: +0.50
OD_CYLINDER: +0.50
OS_ADD: +2.50
OD_ADD: +2.50
OS_AXIS: 070
OS_SPHERE: +0.75
OD_CYLINDER: SPHERE
OD_SPHERE: +0.50
OD_SPHERE: +0.25
OS_CYLINDER: +0.25
OS_AXIS: 090
OS_ADD: +2.50
OD_ADD: +2.50
OS_CYLINDER: +0.50

## 2021-03-10 ASSESSMENT — EXTERNAL EXAM - RIGHT EYE: OD_EXAM: NORMAL

## 2021-03-10 ASSESSMENT — VISUAL ACUITY
OD_CC: 20/25
OS_CC+: -2
METHOD: SNELLEN - LINEAR
CORRECTION_TYPE: GLASSES
OS_CC: 20/25

## 2021-03-10 ASSESSMENT — REFRACTION_MANIFEST
OD_AXIS: 085
OS_AXIS: 085
OD_SPHERE: +0.25
OS_CYLINDER: +0.25
OD_ADD: +3.00
OD_CYLINDER: +0.25
OS_SPHERE: +0.75
OS_ADD: +3.00

## 2021-03-10 ASSESSMENT — CUP TO DISC RATIO
OS_RATIO: 0.3
OD_RATIO: 0.3

## 2021-03-10 ASSESSMENT — TONOMETRY
OS_IOP_MMHG: 10
OD_IOP_MMHG: 12
IOP_METHOD: ICARE

## 2021-03-10 ASSESSMENT — CONF VISUAL FIELD
OD_NORMAL: 1
METHOD: COUNTING FINGERS

## 2021-03-10 ASSESSMENT — EXTERNAL EXAM - LEFT EYE: OS_EXAM: NORMAL

## 2021-03-10 NOTE — PROGRESS NOTES
"   1. History of pituitary adenoma resection- I see no indication of residual tumor threatening visual system.  She has a history of unreliable visual fields-  Confrontation visual fields today full for MD in both eyes.  Recommend observation.  It is reassuring that her OCT retinal nerve fiber layer is normal in both eyes as this strongly suggest that she did not have any permanent damage from her prior pituitary adenoma.    2. Borderline mixed cataracts in both eyes- not yet visually significant- re-evaluate in 1 year.  I suspect her visual symptoms that she complains of very combination of ocular surface disease/blepharitis, refractive error, and early cataracts.    3. Hyperopia- new manifest refraction today given to patient    Follow-up in 1 year with Dr. Spear.    Francis Flores is a pleasant 56 year old  female who presents to my neuro-ophthalmology clinic today     Referred by Kristina Arriaza PA-C    HPI: The patient endorses progressively worsening vision for several years in both eyes, LEFT > RIGHT associated with eye burning, irritation. There has been no recent change or any sudden worsening. Her last eye exam was a few months ago in [place], [place], but she does not recall any explanation for her symptoms; she did receive an updated glasses prescription which helped but they give her headache so she does not use them. She describes her vision as \"low\" in both eyes, \"foggy.\" She denies having any problems with glare or night vision. She states the decreased vision is constant throughout the day without any fluctuation. However, her near vision is worse than distance.     She had a virtual visit with Neurosurgery on 2/8/2021 at [facility] time she endorsed 1-2 months of a diffuse headache. She states she was given pain medication and her headache is gone now.     The patient was diagnosed with a pituitary adenoma complicated by Cushing's disease, s/p transnasal endoscopic resection in March 2019 by  " Breann. Last eye exam in our system was in 12/2017 by Dr. Spear, at which time she had generalized constriction in both eyes without bitemporal defect on visual field.     Independent historians:  Patient  Of note, a professional [employer]  was used for this encounter    Review of outside testing:  MRI pituitary with and without contrast (2/13/2021):  Impression: Stable appearance of the residual pituitary gland after  transsphenoidal resection, without evidence to suggest  recurrent/residual tumor or new lesion.    My interpretation performed today of outside testing:  I reviewed these images today and agree with the interpretation.     Review of outside clinical notes:  Not applicable    Past medical history:  Pituitary adenoma 2008, s/p transnasal endoscopic resection 3/2019  Gastroesophageal reflux disease  Osteoporosis  Thyroid disease  Sleep apnea  Benign positional vertigo    Refractive error    Family history / social history:  Patient denies any family history of glaucoma, macular degeneration    Exam:  Visual acuity is 20/25 in each eye without correction but corrects to 20/20 in each eye with glasses. Intraocular pressure is 12/10. Pupils are briskly reactive without an afferent pupillary defect. Confrontational visual fields are full in both eyes for MD. Extraocular motility is full.  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  The posterior dilated fundus exam appeared essentially normal.  Both optic nerves are healthy without swelling or pallor.    Tests ordered and interpreted today:  Glaucoma top visual field 03/10/21   RIGHT: generalized depression with diffuse peripheral constriction, no temporal defect. MD -7.6  LEFT: generalized depression with diffuse peripheral constriction, the deficits do not respect the vertical or horizontal meridian.     OCT RNFL 03/10/21:  RIGHT: normal  LEFT: normal         Complete documentation of  historical and exam elements from today's encounter can be found in the full encounter summary report (not reduplicated in this progress note).  I personally obtained the chief complaint(s) and history of present illness.  I confirmed and edited as necessary the review of systems, past medical/surgical history, family history, social history, and examination findings as documented by others; and I examined the patient myself.  I personally reviewed the relevant tests, images, and reports as documented above.  I formulated and edited as necessary the assessment and plan and discussed the findings and management plan with the patient and family.  I personally reviewed the ophthalmic test(s) associated with this encounter, agree with the interpretation(s) as documented by the resident/fellow, and have edited the corresponding report(s) as necessary.     MD Bud Ruiz MD  Ophthalmology PGY-3  [facility]

## 2021-03-16 ASSESSMENT — CONF VISUAL FIELD: OS_NORMAL: 1

## 2021-03-16 ASSESSMENT — REFRACTION_WEARINGRX: OD_AXIS: 105

## 2021-07-16 ENCOUNTER — TELEPHONE (OUTPATIENT)
Dept: NEUROSURGERY | Facility: CLINIC | Age: 56
End: 2021-07-16

## 2021-07-16 NOTE — TELEPHONE ENCOUNTER
"[facility] Call Center    Phone Message    May a detailed message be left on voicemail: yes     Reason for Call: Slim would like to schedule an appointment for her mom for \"whoever was the doctor who performed the surgery on her on 3/25/2019\".  Please reach out to Slim to schedule an appointment.    Action Taken: Message routed to:  [facility] ([facility]): Neuro    Travel Screening: Not Applicable                                                                      "

## 2021-08-03 ENCOUNTER — TELEPHONE (OUTPATIENT)
Dept: NEUROSURGERY | Facility: CLINIC | Age: 56
End: 2021-08-03

## 2021-08-06 ENCOUNTER — TELEPHONE (OUTPATIENT)
Dept: NEUROSURGERY | Facility: CLINIC | Age: 56
End: 2021-08-06

## 2021-08-09 DIAGNOSIS — E03.9 HYPOTHYROIDISM, UNSPECIFIED TYPE: ICD-10-CM

## 2021-08-11 RX ORDER — LEVOTHYROXINE SODIUM 75 UG/1
75 TABLET ORAL DAILY
Qty: 30 TABLET | Refills: 0 | Status: SHIPPED | OUTPATIENT
Start: 2021-08-11 | End: 2022-10-19

## 2021-08-11 NOTE — TELEPHONE ENCOUNTER
LEVOTHYROXINE SODIUM 75MCG TABS      Last Written Prescription Date:  7/21/20  Last Fill Quantity: 90,   # refills: 1  Last Office Visit : 4/12/0  Future Office visit:  None scheduled    Routing refill request to provider for review/approval because:  Overdue/abnormal TSH  Lab Test 07/24/19  1020   TSH 0.24*     Nothing more recent in care everywhere nor media  Future orders in que from Kristina Arriaza PA-C  Gap in therapy?  Taking?

## 2021-08-19 ENCOUNTER — VIRTUAL VISIT (OUTPATIENT)
Dept: NEUROSURGERY | Facility: CLINIC | Age: 56
End: 2021-08-19
Payer: COMMERCIAL

## 2021-08-19 DIAGNOSIS — E24.0 CUSHING'S DISEASE (H): ICD-10-CM

## 2021-08-19 DIAGNOSIS — D35.2 PITUITARY ADENOMA (H): Primary | ICD-10-CM

## 2021-08-19 PROCEDURE — 99442 PR PHYSICIAN TELEPHONE EVALUATION 11-20 MIN: CPT | Mod: 95 | Performed by: NEUROLOGICAL SURGERY

## 2021-08-19 NOTE — LETTER
8/19/2021       RE: Francis Flores  [address]  [facility] [address]     Dear Colleague,    Thank you for referring your patient, Francis Flores, to the [facility] NEUROSURGERY CLINIC [place] at [facility]. Please see a copy of my visit note below.    Francis is a 56 year old who is being evaluated via a billable telephone visit.      What phone number would you like to be contacted at? [phone]  How would you like to obtain your AVS? Mail a copy  Phone call duration: 15 minutes    Feels relatively well. Not on any hormone replacement. Had headache and left eye pain. Headaches much improved. Has some right forehead pain, tolerable. Left nasal congestion. Fatigue not an issue. See dictated note.  RODRIGUEZ Crain MD        Again, thank you for allowing me to participate in the care of your patient.      Sincerely,    Derrell Crain MD

## 2021-08-19 NOTE — PROGRESS NOTES
Francis is a 56 year old who is being evaluated via a billable telephone visit.      What phone number would you like to be contacted at? [phone]  How would you like to obtain your AVS? Mail a copy  Phone call duration: 15 minutes    Feels relatively well. Not on any hormone replacement. Had headache and left eye pain. Headaches much improved. Has some right forehead pain, tolerable. Left nasal congestion. Fatigue not an issue. See dictated note.  RODRIGUEZ Crain MD

## 2021-08-19 NOTE — PATIENT INSTRUCTIONS
Repeat MRI brain with contrast in one year (can be done in [place]) - ordered  Phone follow up in one year after MRI    Follow up with Dr. Jeong in ENT next available to evaluate nasal congestion    Have your hormone labs checked same day as your ENT appointment (labs already ordered)

## 2021-08-19 NOTE — PROGRESS NOTES
Service Date: 2021    Eliu Dewey MD  [facility]  [address] [place], [address]    RE:       Francis Flores    MRN:   [id number]    :    1965      Dear Dr. Dewey:    We spoke to Mrs. Flores as part of a telephone followup in Pituitary Clinic today.  As you know, she is about 2-1/2 years out from an endoscopic endonasal resection of a pituitary adenoma that was causing Cushings disease.  She has been feeling reasonably well.  She was seen in our clinic a few months ago with worsening headaches and left eye pain.  These symptoms have improved considerably.  She still has some pain in the right forehead, which she describes as tolerable.  Her preoperative fatigue resolved and she did not describe any symptoms suggestive of an endocrinopathy.  She is not on any hormone replacement.  She describes ongoing left nasal congestion and some discomfort in the sinuses.  She denies any drainage.  This telephone visit was completed with the aid of a [employer] .    Over the phone, she sounded well.  Her speech, language and phonation seemed to be normal taking into account the need for an .    REVIEW OF STUDIES:  We went over her MRI from 2021 and described the findings to her.  This shows some hypoenhancement on the right side of the sella, which is likely some postsurgical change immediately posterior to the pituitary gland.  This hypoenhancement seems smaller and less noticeable than before.  It likely represents postoperative changes rather than residual tumor.    IMPRESSION AND PLAN:  We will arrange for her to see our Skull Base Surgery partner from ENT, Dr. Jeong, to evaluate her persistent nasal congestion.  When she comes for that visit, we can have her pituitary hormone panel checked as well.  We can repeat an MRI of the pituitary in 1 year from now and this can be done in [place] if convenient.  Please do not hesitate to contact us with  questions.    Sincerely,     Derrell Crain MD        D: 2021   T: 2021   MT: [place]    Name:     VINCE THOMPSON  MRN:      [id number]        Account:      [id number]   :      1965           Service Date: 2021       Document: [id number]

## 2021-08-19 NOTE — LETTER
8/19/2021      RE: Francis Norrisbril  [address]  [place] [address]       Francis is a 56 year old who is being evaluated via a billable telephone visit.      What phone number would you like to be contacted at? [phone]  How would you like to obtain your AVS? Mail a copy  Phone call duration: 15 minutes    Feels relatively well. Not on any hormone replacement. Had headache and left eye pain. Headaches much improved. Has some right forehead pain, tolerable. Left nasal congestion. Fatigue not an issue. See dictated note.  MD Derrell Mercado MD

## 2021-08-19 NOTE — LETTER
2021       RE: Francis Flores  [address]  [place] [address]     Dear Colleague,    Thank you for referring your patient, Francis Flores, to the [facility] NEUROSURGERY CLINIC [place] at [facility]. Please see a copy of my visit note below.    Feels relatively well. Not on any hormone replacement. Had headache and left eye pain. Headaches much improved. Has some right forehead pain, tolerable. Left nasal congestion. Fatigue not an issue. See dictated note.  RODRIGUEZ Crain MD      Service Date: 2021    Eliu Dewey MD  [facility]  [address]  [place], [address]    RE:       Francis Flores    MRN:   [id number]    :    1965      Dear Dr. Dewey:    We spoke to Mrs. Flores as part of a telephone followup in Pituitary Clinic today.  As you know, she is about 2-1/2 years out from an endoscopic endonasal resection of a pituitary adenoma that was causing Cushings disease.  She has been feeling reasonably well.  She was seen in our clinic a few months ago with worsening headaches and left eye pain.  These symptoms have improved considerably.  She still has some pain in the right forehead, which she describes as tolerable.  Her preoperative fatigue resolved and she did not describe any symptoms suggestive of an endocrinopathy.  She is not on any hormone replacement.  She describes ongoing left nasal congestion and some discomfort in the sinuses.  She denies any drainage.  This telephone visit was completed with the aid of a [place] .    Over the phone, she sounded well.  Her speech, language and phonation seemed to be normal taking into account the need for an .    REVIEW OF STUDIES:  We went over her MRI from 2021 and described the findings to her.  This shows some hypoenhancement on the right side of the sella, which is likely some postsurgical change immediately posterior to the  pituitary gland.  This hypoenhancement seems smaller and less noticeable than before.  It likely represents postoperative changes rather than residual tumor.    IMPRESSION AND PLAN:  We will arrange for her to see our Skull Base Surgery partner from ENT, Dr. Jeong, to evaluate her persistent nasal congestion.  When she comes for that visit, we can have her pituitary hormone panel checked as well.  We can repeat an MRI of the pituitary in 1 year from now and this can be done in [place] if convenient.  Please do not hesitate to contact us with questions.    Sincerely,     Derrell Crain MD

## 2022-06-06 DIAGNOSIS — E24.0 CUSHING'S DISEASE (H): ICD-10-CM

## 2022-06-06 DIAGNOSIS — D35.2 PITUITARY ADENOMA (H): Primary | ICD-10-CM

## 2022-06-20 ENCOUNTER — TELEPHONE (OUTPATIENT)
Dept: NEUROSURGERY | Facility: CLINIC | Age: 57
End: 2022-06-20
Payer: COMMERCIAL

## 2022-07-18 ENCOUNTER — TELEPHONE (OUTPATIENT)
Dept: NEUROSURGERY | Facility: CLINIC | Age: 57
End: 2022-07-18

## 2022-07-18 DIAGNOSIS — D35.2 PITUITARY ADENOMA (H): Primary | ICD-10-CM

## 2022-07-18 NOTE — TELEPHONE ENCOUNTER
Writer routed to Neurosurgery Clinic Scheduling.   Please schedule MRI for patient with follow with Dr. Breann Bo LPN  Neurosurgery

## 2022-07-18 NOTE — TELEPHONE ENCOUNTER
Health Call Center    Phone Message    May a detailed message be left on voicemail: yes     Reason for Call: Order(s): Other:   Reason for requested: MRI    Pt is requesting an order for an MRI.  Please call her to let her know once the order is placed so she can get is scheduled.  Thanks.

## 2022-08-16 ENCOUNTER — OFFICE VISIT (OUTPATIENT)
Dept: NEUROSURGERY | Facility: CLINIC | Age: 57
End: 2022-08-16
Payer: COMMERCIAL

## 2022-08-16 ENCOUNTER — ANCILLARY PROCEDURE (OUTPATIENT)
Dept: MRI IMAGING | Facility: CLINIC | Age: 57
End: 2022-08-16
Attending: NEUROLOGICAL SURGERY
Payer: COMMERCIAL

## 2022-08-16 ENCOUNTER — LAB (OUTPATIENT)
Dept: LAB | Facility: CLINIC | Age: 57
End: 2022-08-16

## 2022-08-16 VITALS — HEART RATE: 70 BPM | DIASTOLIC BLOOD PRESSURE: 68 MMHG | SYSTOLIC BLOOD PRESSURE: 103 MMHG | OXYGEN SATURATION: 100 %

## 2022-08-16 DIAGNOSIS — D35.2 PITUITARY ADENOMA (H): ICD-10-CM

## 2022-08-16 DIAGNOSIS — D35.2 PITUITARY ADENOMA (H): Primary | ICD-10-CM

## 2022-08-16 LAB
ANION GAP SERPL CALCULATED.3IONS-SCNC: 8 MMOL/L (ref 3–14)
BUN SERPL-MCNC: 13 MG/DL (ref 7–30)
CALCIUM SERPL-MCNC: 9.4 MG/DL (ref 8.5–10.1)
CHLORIDE BLD-SCNC: 109 MMOL/L (ref 94–109)
CO2 SERPL-SCNC: 26 MMOL/L (ref 20–32)
CORTIS SERPL-MCNC: 6.7 UG/DL
CREAT SERPL-MCNC: 0.7 MG/DL (ref 0.52–1.04)
FSH SERPL IRP2-ACNC: 40.6 MIU/ML
GFR SERPL CREATININE-BSD FRML MDRD: >90 ML/MIN/1.73M2
GLUCOSE BLD-MCNC: 83 MG/DL (ref 70–99)
LH SERPL-ACNC: 31.7 MIU/ML
POTASSIUM BLD-SCNC: 3.8 MMOL/L (ref 3.4–5.3)
PROLACTIN SERPL 3RD IS-MCNC: 8 NG/ML (ref 5–23)
SODIUM SERPL-SCNC: 143 MMOL/L (ref 133–144)
T4 FREE SERPL-MCNC: 0.91 NG/DL (ref 0.76–1.46)
TSH SERPL DL<=0.005 MIU/L-ACNC: 4.22 MU/L (ref 0.4–4)

## 2022-08-16 PROCEDURE — 83002 ASSAY OF GONADOTROPIN (LH): CPT | Mod: 90 | Performed by: PATHOLOGY

## 2022-08-16 PROCEDURE — 70553 MRI BRAIN STEM W/O & W/DYE: CPT | Mod: GC | Performed by: RADIOLOGY

## 2022-08-16 PROCEDURE — 80048 BASIC METABOLIC PNL TOTAL CA: CPT | Performed by: PATHOLOGY

## 2022-08-16 PROCEDURE — 99000 SPECIMEN HANDLING OFFICE-LAB: CPT | Performed by: PATHOLOGY

## 2022-08-16 PROCEDURE — 36415 COLL VENOUS BLD VENIPUNCTURE: CPT | Performed by: PATHOLOGY

## 2022-08-16 PROCEDURE — 83001 ASSAY OF GONADOTROPIN (FSH): CPT | Mod: 90 | Performed by: PATHOLOGY

## 2022-08-16 PROCEDURE — 99207 PR NO DOCUMENTATION ON VISIT: CPT | Performed by: NEUROLOGICAL SURGERY

## 2022-08-16 PROCEDURE — 84305 ASSAY OF SOMATOMEDIN: CPT | Mod: 90 | Performed by: PATHOLOGY

## 2022-08-16 PROCEDURE — 83003 ASSAY GROWTH HORMONE (HGH): CPT | Mod: 90 | Performed by: PATHOLOGY

## 2022-08-16 PROCEDURE — A9585 GADOBUTROL INJECTION: HCPCS | Performed by: RADIOLOGY

## 2022-08-16 PROCEDURE — 84439 ASSAY OF FREE THYROXINE: CPT | Performed by: PATHOLOGY

## 2022-08-16 PROCEDURE — 82024 ASSAY OF ACTH: CPT | Mod: 90 | Performed by: PATHOLOGY

## 2022-08-16 PROCEDURE — 84403 ASSAY OF TOTAL TESTOSTERONE: CPT | Mod: 90 | Performed by: PATHOLOGY

## 2022-08-16 PROCEDURE — 84443 ASSAY THYROID STIM HORMONE: CPT | Performed by: PATHOLOGY

## 2022-08-16 PROCEDURE — 84146 ASSAY OF PROLACTIN: CPT | Mod: 90 | Performed by: PATHOLOGY

## 2022-08-16 PROCEDURE — 82397 CHEMILUMINESCENT ASSAY: CPT | Mod: 90 | Performed by: PATHOLOGY

## 2022-08-16 PROCEDURE — 82533 TOTAL CORTISOL: CPT | Mod: 90 | Performed by: PATHOLOGY

## 2022-08-16 RX ORDER — GADOBUTROL 604.72 MG/ML
7.5 INJECTION INTRAVENOUS ONCE
Status: COMPLETED | OUTPATIENT
Start: 2022-08-16 | End: 2022-08-16

## 2022-08-16 RX ADMIN — GADOBUTROL 7.5 ML: 604.72 INJECTION INTRAVENOUS at 07:49

## 2022-08-16 ASSESSMENT — PAIN SCALES - GENERAL: PAINLEVEL: NO PAIN (0)

## 2022-08-16 NOTE — PATIENT INSTRUCTIONS
Pituitary Lab Panel today.    Referral to Dr Weir, Endocrinology, next available appointment.    Follow up with Dr Jeong, ENT for nasal congestion.      Follow up with Dr Crain in one year with MRI Pituitary with and without contrast prior.  Patient prefers to have MRI same day as appointment with Dr Crain.    Call Kaley RN  Neurosurgery Care Coordinator with questions/concerns     Thank you for using [facility]

## 2022-08-16 NOTE — LETTER
8/16/2022       RE: Francis Flores  [address]  [place] [address]     Dear Colleague,    Thank you for referring your patient, Francis Flores, to the [facility] NEUROSURGERY CLINIC [place] at [facility]. Please see a copy of my visit note below.    Feeling well. Had no concerns. Remote [employer] . Weight has been stable. Chronic low back pain, right side. No radiation of pain. Ibuprofen prn helps. Has done PT in the past. Has had upper respiratory symptoms multiple times since surgery. Visit 25 minutes.  See dictated note.  RODRIGUEZ Crain MD      Again, thank you for allowing me to participate in the care of your patient.      Sincerely,    Derrell Crain MD

## 2022-08-16 NOTE — PROGRESS NOTES
Feeling well. Had no concerns. Remote [facility] . Weight has been stable. Chronic low back pain, right side. No radiation of pain. Ibuprofen prn helps. Has done PT in the past. Has had upper respiratory symptoms multiple times since surgery. Visit 25 minutes.  See dictated note.  RODRIGUEZ Crain MD

## 2022-08-16 NOTE — DISCHARGE INSTRUCTIONS
MRI Contrast Discharge Instructions    The IV contrast you received today will pass out of your body in your  urine. This will happen in the next 24 hours. You will not feel this process.  Your urine will not change color.    Drink at least 4 extra glasses of water or juice today (unless your doctor  has restricted your fluids). This reduces the stress on your kidneys.  You may take your regular medicines.    If you are on dialysis: It is best to have dialysis today.    If you have a reaction: Most reactions happen right away. If you have  any new symptoms after leaving the hospital (such as hives or swelling),  call your hospital at the correct number below. Or call your family doctor.  If you have breathing distress or wheezing, call 911.    Special instructions: ***    I have read and understand the above information.    Signature:______________________________________ Date:___________    Staff:__________________________________________ Date:___________     Time:__________    [place] [facility] Departments:    ___[place]: [phone]  ___[facility]: [phone]  ___[place]: [phone] ___[facility]: [phone]  ___[facility]: [phone]  ___[facility]: [phone]  ___[phone]  ___[facility]: [phone]  ___[phone]

## 2022-08-17 LAB
ACTH PLAS-MCNC: 115 PG/ML
GH SERPL-MCNC: <0.1 UG/L

## 2022-08-18 LAB — TESTOST SERPL-MCNC: 8 NG/DL (ref 8–60)

## 2022-08-19 LAB — IGF-I BLD-MCNC: 54 NG/ML (ref 47–236)

## 2022-08-22 LAB — A-PGH SER-MCNC: 0.5 NG/ML

## 2022-08-28 NOTE — PROGRESS NOTES
Service Date: 2022    Eliu Dewey MD  [facility]  [address] [place]  [address]    RE:  Francis Flores  MRN:  [id number]  :  1965    Dear Dr. Dewey:    We saw Ms. Flores back in Pituitary Clinic on 2022 for followup of her Cushing disease and pituitary adenoma.  She is about 3-1/2 years out from an endoscopic endonasal resection of this complex tumor.  It appeared that we achieved a surgical cure.  Overall, she seems to be doing well.  She is not having any headaches.  Her weight and appetite have been stable.  Her main complaint was exacerbation of chronic low back pain on the right side of the lumbar region.  There is no radiation.  She takes ibuprofen intermittently for relief.  She has done some physical therapy in the past and she did not feel that there was benefit.  She also has had recurrent upper respiratory symptoms since surgery.  We see from the records she was in the Emergency Department at [facility] on 2022 for sore throat, arthralgias and dyspnea.  It appears her COVID PCR test was positive and she was discharged home with supportive care.  She was accompanied by her daughter.  We also had a remote [facility] .    PHYSICAL EXAMINATION:  Her general appearance is good.  Blood pressure 103/68, heart rate 70.  Although her weight was not checked today, she appears to be about the same as last year.  She appears comfortable.  Her extraocular movements are full.  Visual fields are full on confrontation.  Pupils are small and reactive.  She moves all extremities with good strength and coordination.  Her speech and language seem normal.    REVIEW OF STUDIES:  We went over her MRI from earlier today.  We compared it to her preoperative scan.  This shows some extensive postoperative changes throughout the sphenoid sinus and the sella.  The pituitary stalk and gland are displaced to the right as before.  The gland itself is  hypoenhancing.  We see some postsurgical changes and a single focus of hypoenhancement in the left parasellar space.  This is most likely a postsurgical artifact and not residual or recurrent tumor.    IMPRESSION AND PLAN:  She is not exhibiting any symptoms or signs of recurrent Cushing disease.  Because she had some arthralgias recently, we will recheck a pituitary panel today.  We can arrange followup with Dr. Jeong, our [facility] partner from ENT, for followup of her ongoing nasal congestion.  Finally, she is overdue to follow up with Dr. Weir in Pituitary Endocrinology.  She remains on daily thyroid replacement.  We will see her back in 1 year with another MRI.  Please do not hesitate to contact us with questions.    Sincerely,    Derrell Crain MD        D: 2022   T: 2022   MT: preeti    Name:     VINCE THOMPSON  MRN:      [id number]        Account:      [id number]   :      1965           Service Date: 2022       Document: [id number]

## 2022-08-29 ENCOUNTER — DOCUMENTATION ONLY (OUTPATIENT)
Dept: NEUROSURGERY | Facility: CLINIC | Age: 57
End: 2022-08-29

## 2022-08-29 NOTE — PROGRESS NOTES
Tete Weir MD Tummala, Ramachandra Prasad, MD; [facility]  Cc: Kaley Daniel, RN  Thank you very much, I will follow up!     Endo team: could you set up in 3 weeks?     Tete             Previous Messages       ----- Message -----   From: Derrell Crain MD   Sent: 8/28/2022   1:15 PM CDT   To: Kaley Daniel RN, Tete Weir MD   Subject: non urgent follow up of Cushing's disease         Hey Dr. Weir,   We saw this patient two weeks ago. She is overall doing OK. If you remember, she did not have florid Cushing's disease. Basically osteoporosis was the main manifestation. She is over 3 years out from surgery and MRI doesn't show obvious tumor. We did afternoon pituitary panel. ACTH is mildly elevated again at 115. It never normalized and peak was >1250 preop. Don't know if this means anything. We were going to repeat MRI in one year. I'll defer any further follow up to you.   Thanks   RT

## 2022-09-20 NOTE — TELEPHONE ENCOUNTER
----- Message from SHAYY James MD sent at 4/16/2019  5:21 PM CDT -----  Regarding: Call pt with , dose change  Dear Patti  Please call Mrs. Flores to check how she is doing.  I would like for her to take a very low dose of prednisone for now.  Prednisone dose is to be reduced every 2 weeks of so, assuming she is felling well.  She was on Prednisone 5 mg until yesterday, so she can start 4 mg tomorrow morning, and use it for 2 weeks.  The plan would be as follows:  4/17-4/30: Prednisone 4 mg (four 1 mg tablets) in the morning  5/1-5/14: Prednisone 3 mg (three 1 mg tablets) in the morning  5/15-5/28:  Prednisone 2 mg (two 1 mg tablets) in the morning  5/29-6/11:  Prednisone 1 mg (one 1 mg tablets) in the morning  STOP.  She also needs a return appointment with me.  I wrote a prescription for Prednisone 1 mg tablets.   Thanks,  Maine James MD PhD    Division of Endocrinology and Diabetes      
I  had  to leave a message with  . I did  Update the script you sent as they cannot fill use as directed. I will mail the instructions out to her  To receive in a couple days.  Your first available is not until August  for a f/u . Ok to wait until then to see her in clinic?   
Patient and daughter request a call from Dr James  to explain prednisone dosing  And  If she is adrenal insufficient or not. She had been instructed to STOP the prednisone  by Dr James which she did and now she wants her to do a low dose taper.  Patient  wants to  know why .   
Patient

## 2022-10-19 ENCOUNTER — VIRTUAL VISIT (OUTPATIENT)
Dept: ENDOCRINOLOGY | Facility: CLINIC | Age: 57
End: 2022-10-19
Attending: NEUROLOGICAL SURGERY
Payer: COMMERCIAL

## 2022-10-19 ENCOUNTER — TELEPHONE (OUTPATIENT)
Dept: ENDOCRINOLOGY | Facility: CLINIC | Age: 57
End: 2022-10-19

## 2022-10-19 DIAGNOSIS — E24.0 PITUITARY DEPENDENT CUSHING DISEASE (H): Primary | ICD-10-CM

## 2022-10-19 DIAGNOSIS — E03.9 HYPOTHYROIDISM, UNSPECIFIED TYPE: ICD-10-CM

## 2022-10-19 DIAGNOSIS — D35.2 PITUITARY ADENOMA (H): ICD-10-CM

## 2022-10-19 PROCEDURE — 99215 OFFICE O/P EST HI 40 MIN: CPT | Mod: 95 | Performed by: INTERNAL MEDICINE

## 2022-10-19 RX ORDER — DEXAMETHASONE 1 MG
1 TABLET ORAL ONCE
Qty: 1 TABLET | Refills: 0 | Status: SHIPPED | OUTPATIENT
Start: 2022-10-19 | End: 2022-10-19

## 2022-10-19 RX ORDER — LEVOTHYROXINE SODIUM 75 UG/1
75 TABLET ORAL DAILY
Qty: 90 TABLET | Refills: 3 | Status: SHIPPED | OUTPATIENT
Start: 2022-10-19 | End: 2023-08-04

## 2022-10-19 NOTE — PROGRESS NOTES
Francis Flores is being evaluated via a billable video visit.        How would you like to obtain your AVS? [employer]  For the video visit, send the invitation by: Text to cell phone: [phone]  Will anyone else be joining your video visit? Yes, daughter and interpretor.      Video visit  Start 1:15 pm  End: 1:45 pm   Amwell with     Endocrinology Clinic Follow up    Assessment and Plan:  A 57 year old woman with a ACTH producing pituitary macroadenoma s/p pituitary surgery on 3/25/2019, here is the follow up after 2 years of interval    1. Cushing's disease:  ACTH producing pituitary macroadenoma s/p pituitary surgery on 3/25/2019. No clinical signs of recurrence of Cushing, although ACTH has been persistently elevated 115 but relatively stable. Patient also concerns about weight gain, currenlty her BW is 187 lb.     - Next MRI in summer 2023 with Dr. Crain    - 1 mg dexamethasone suppression at [facility].     - A1C      2. Hypothyroidism: Managed well with levothyroxine 75 mcg/day. Compliance is good.     - continue current LT4 75 mcg daily  - TSH, free T4      3. Bone health: Ms. Flores is on vitamin D replacement therapy.  DXA may considered    RTC with me in 1 year    Total 45 minutes spent on the date of the encounter doing chart review, history and exam, documentation and further activities as noted above.    Tete Weir MD  Staff Physician  Endocrinology and Metabolism  [facility]  License: [id number]  Pager: [phone]    Interval History as of 10/19/2022 : Patient has been doing well. Good energy level.  lb, Appetite: fair. She was seen by Dr. Crain in 8/2022 and MRI showed stable.Still taking LT4 75 mcg.   Interval History as of 4/1/2020 : Patient has been doing well, mentioned no complaint except left nostril discomfort. BW stable, no HA, eating well. Complaint to levothyroxine 75 mcg. Good energy level. No hydrocortisone.   HPI: Mrs. Mark  is a 54 year old [place] woman that is seen today for follow-up of a ACTH secreting pituitary macroadenoma s/p endoscopic Stealth-guided transsphenoidal resection of pituitary tumor on 3/25/2019.    Today's visit was facilitated by an .  Briefly, Mrs. Flores  had a pituitary macroadenoma diagnosed in 2008. Patient has had multiple evaluations over the years and was lost to follow-up for a period of time.  She came back to our clinic in 2018, after been evaluated at [facility]. Please see my prior notes for further detail on her prior history.  As patient had confirmed excess ACTH production and a pituitary mass, she was seen by neurosurgery and had a endoscopic Stealth-guided transsphenoidal resection of pituitary tumor on 3/25/2019.  Surgery was not considered curative and she was discharged home with no steroid replacement therapy.  Post operative cortisol did not reached less than 2-3. Pathology compatible to ACTH stained adenoma with fibrous tissues.   Labs from 4/1/2019 were okay. Subsequently the patient presented to clinic reporting nausea and vomiting that started the night prior to her visit, accompanied by abdominal pain.  No fever, no diarrhea, no dysuria. She also reported frontal headache and blood discharge from her nose.  No clear liquid coming out from her nose.  In addition to this one, she visited the ED several times and reported she was not feeling well.  She was placed on a prednisone taper, felt better initially, and has been now off steroids for a while.    She is on levothyroxine 75 mcg/day that she takes regularly.  No recent dose changes.  She denies symptoms suggestive of hypo- or hyperthyroidism.     Other than complain of pain in multiple muscles and joints (present for many years) she feels well and has no new complains today.  She states she is happy with her care and appreciates all the time we spend explaining things to her and answering her questions.  She has not yet  done the dexa scan and is wondering if now it would be a good time for her to get it done.     ROS:  11 point ROS as detailed in the HPI above or negative    Medications  Current Outpatient Medications   Medication     calcium carbonate (OS- MG [place]. CA) 500 tablet     cholecalciferol (VITAMIN D3) 1250 mcg (82347 units) capsule     ibuprofen 200 MG capsule     Multiple Vitamins-Minerals (CENTROVITE) TABS     pantoprazole sodium 40 MG tablet     polyethylene glycol (MIRALAX/GLYCOLAX) packet     sodium chloride (OCEAN) 0.65 % nasal spray     VITAMIN D, CHOLECALCIFEROL, PO     cetirizine (ZYRTEC) 10 MG tablet     cyanocobalamin (VITAMIN B-12) 1000 MCG tablet     levocetirizine (XYZAL) 5 MG tablet     levothyroxine (SYNTHROID/LEVOTHROID) 75 MCG tablet     LINZESS 145 MCG capsule     meloxicam (MOBIC) 15 MG tablet     mineral oil-hydrophilic petrolatum (AQUAPHOR) external ointment     naproxen (NAPROSYN) 500 MG tablet     ondansetron (ZOFRAN) 4 MG tablet     ranitidine (ZANTAC) 150 MG tablet     senna-docusate (SENOKOT-S/PERICOLACE) 8.6-50 MG tablet     tiZANidine (ZANAFLEX) 2 MG tablet     No current facility-administered medications for this visit.     Family Hx   No Family Hx of pituitary disease, asthma or osteoporosis.   Has 4 healthy children, 25 yrs old girl, 20 yrs old twin boys and 17 yrs old boy.     Social Hx   Behavioral history: No tobacco use.   Home environment: No secondhand tobacco smoke in home.   Mrs. Flores does not work.   She lives with her  and their 4 children. Mrs. Flores lives in [place].  She does not smoke or consume alcohol.     PMH   Illnesses:   Non functioning (?) pituitary tumor as per HPI   Early ovarian failure/ incorrect age?  Osteoporosis/previous bone fracture     Surgeries:   Right knee (11/2008)   TSS 3/25/2019    Menstrual History:   Menarche at age 13   Cycles were regular   Gestae 8 Para 4 (one gemellar pregnancy)   Menopause ~14 yrs ago.     Physical Exam:  LMP   (LMP Unknown)   General : Alert, oriented X4, not distressed, no cyanosis.  Face: normal color, no edema, no hirsutism  Resp: breathing normally  Psychological: appropriate mood and affect      Lab results    MRI reviewed: 8/16/2022 I personally reviewed the original images.

## 2022-10-19 NOTE — TELEPHONE ENCOUNTER
----- Message from Lorin Jensen V sent at 10/19/2022  4:27 PM CDT -----  Regarding: FW: Labs to be faxed    ----- Message -----  From: Myhre, Taylor  Sent: 10/19/2022   3:29 PM CDT  To: Clinic [id number]  Subject: Labs to be faxed                                 Please fax over lab request from  to [facility].  Fax number is [phone].    Thanks so much !  Taylor Myhre,YAAKOV

## 2022-10-19 NOTE — TELEPHONE ENCOUNTER
Lab orders faxed to [facility] Fax # [phone]  Per Dr Destin Mcneil RN on 10/19/2022 at 3:32 PM

## 2022-10-19 NOTE — TELEPHONE ENCOUNTER
----- Message from Tete Weir MD sent at 10/19/2022  1:43 PM CDT -----  Regarding: [facility] lab order  - A1C  - cortisol  - ACTH  - TSH  - free T4    Thank you very much!!    Tete

## 2022-10-19 NOTE — LETTER
10/19/2022       RE: Francis Flores  [address]  [place] [address]     Dear Colleague,    Thank you for referring your patient, Francis Flores, to the [facility] [place] [facility]. Please see a copy of my visit note below.    Francis Flores is being evaluated via a billable video visit.        How would you like to obtain your AVS? [employer]  For the video visit, send the invitation by: Text to cell phone: [phone]  Will anyone else be joining your video visit? Yes, daughter and interpretor.      Video visit  Start 1:15 pm  End: 1:45 pm   Amwell with     Endocrinology Clinic Follow up    Assessment and Plan:  A 57 year old woman with a ACTH producing pituitary macroadenoma s/p pituitary surgery on 3/25/2019, here is the follow up after 2 years of interval    1. Cushing's disease:  ACTH producing pituitary macroadenoma s/p pituitary surgery on 3/25/2019. No clinical signs of recurrence of Cushing, although ACTH has been persistently elevated 115 but relatively stable. Patient also concerns about weight gain, currenlty her BW is 187 lb.     - Next MRI in summer 2023 with Dr. Crain    - 1 mg dexamethasone suppression at [facility].     - A1C      2. Hypothyroidism: Managed well with levothyroxine 75 mcg/day. Compliance is good.     - continue current LT4 75 mcg daily  - TSH, free T4      3. Bone health: Ms. Flores is on vitamin D replacement therapy.  DXA may considered    RTC with me in 1 year    Total 45 minutes spent on the date of the encounter doing chart review, history and exam, documentation and further activities as noted above.    Tete Weir MD  Staff Physician  Endocrinology and Metabolism  [facility]  License: [place] [id number]  Pager: [phone]    Interval History as of 10/19/2022 : Patient has been doing well. Good energy level.  lb, Appetite: fair.  She was seen by Dr. Crain in 8/2022 and MRI showed stable.Still taking LT4 75 mcg.   Interval History as of 4/1/2020 : Patient has been doing well, mentioned no complaint except left nostril discomfort. BW stable, no HA, eating well. Complaint to levothyroxine 75 mcg. Good energy level. No hydrocortisone.   HPI: Mrs. Flores is a 54 year old [place] woman that is seen today for follow-up of a ACTH secreting pituitary macroadenoma s/p endoscopic Stealth-guided transsphenoidal resection of pituitary tumor on 3/25/2019.    Today's visit was facilitated by an .  Briefly, Mrs. Flores  had a pituitary macroadenoma diagnosed in 2008. Patient has had multiple evaluations over the years and was lost to follow-up for a period of time.  She came back to our clinic in 2018, after been evaluated at [facility]. Please see my prior notes for further detail on her prior history.  As patient had confirmed excess ACTH production and a pituitary mass, she was seen by neurosurgery and had a endoscopic Stealth-guided transsphenoidal resection of pituitary tumor on 3/25/2019.  Surgery was not considered curative and she was discharged home with no steroid replacement therapy.  Post operative cortisol did not reached less than 2-3. Pathology compatible to ACTH stained adenoma with fibrous tissues.   Labs from 4/1/2019 were okay. Subsequently the patient presented to clinic reporting nausea and vomiting that started the night prior to her visit, accompanied by abdominal pain.  No fever, no diarrhea, no dysuria. She also reported frontal headache and blood discharge from her nose.  No clear liquid coming out from her nose.  In addition to this one, she visited the ED several times and reported she was not feeling well.  She was placed on a prednisone taper, felt better initially, and has been now off steroids for a while.    She is on levothyroxine 75 mcg/day that she takes regularly.  No recent dose changes.  She denies  symptoms suggestive of hypo- or hyperthyroidism.     Other than complain of pain in multiple muscles and joints (present for many years) she feels well and has no new complains today.  She states she is happy with her care and appreciates all the time we spend explaining things to her and answering her questions.  She has not yet done the dexa scan and is wondering if now it would be a good time for her to get it done.     ROS:  11 point ROS as detailed in the HPI above or negative    Medications  Current Outpatient Medications   Medication     calcium carbonate (OS- MG [place]. CA) 500 tablet     cholecalciferol (VITAMIN D3) 1250 mcg (66355 units) capsule     ibuprofen 200 MG capsule     Multiple Vitamins-Minerals (CENTROVITE) TABS     pantoprazole sodium 40 MG tablet     polyethylene glycol (MIRALAX/GLYCOLAX) packet     sodium chloride (OCEAN) 0.65 % nasal spray     VITAMIN D, CHOLECALCIFEROL, PO     cetirizine (ZYRTEC) 10 MG tablet     cyanocobalamin (VITAMIN B-12) 1000 MCG tablet     levocetirizine (XYZAL) 5 MG tablet     levothyroxine (SYNTHROID/LEVOTHROID) 75 MCG tablet     LINZESS 145 MCG capsule     meloxicam (MOBIC) 15 MG tablet     mineral oil-hydrophilic petrolatum (AQUAPHOR) external ointment     naproxen (NAPROSYN) 500 MG tablet     ondansetron (ZOFRAN) 4 MG tablet     ranitidine (ZANTAC) 150 MG tablet     senna-docusate (SENOKOT-S/PERICOLACE) 8.6-50 MG tablet     tiZANidine (ZANAFLEX) 2 MG tablet     No current facility-administered medications for this visit.     Family Hx   No Family Hx of pituitary disease, asthma or osteoporosis.   Has 4 healthy children, 25 yrs old girl, 20 yrs old twin boys and 17 yrs old boy.     Social Hx   Behavioral history: No tobacco use.   Home environment: No secondhand tobacco smoke in home.   Mrs. Flroes does not work.   She lives with her  and their 4 children. Mrs. Flores lives in [place].  She does not smoke or consume alcohol.     PMH   Illnesses:   Non  functioning (?) pituitary tumor as per HPI   Early ovarian failure/ incorrect age?  Osteoporosis/previous bone fracture     Surgeries:   Right knee (11/2008)   TSS 3/25/2019    Menstrual History:   Menarche at age 13   Cycles were regular   Gestae 8 Para 4 (one gemellar pregnancy)   Menopause ~14 yrs ago.     Physical Exam:  LMP  (LMP Unknown)   General : Alert, oriented X4, not distressed, no cyanosis.  Face: normal color, no edema, no hirsutism  Resp: breathing normally  Psychological: appropriate mood and affect      Lab results    MRI reviewed: 8/16/2022 I personally reviewed the original images.

## 2022-10-19 NOTE — TELEPHONE ENCOUNTER
Pt is having lab orders faxed to [facility]. She was placed on recall list for 1 year follow up for .   Taylor Myhre,VVF

## 2022-10-19 NOTE — TELEPHONE ENCOUNTER
Refaxed lab orders to [facility]  Fax # [phone] as requested.Patti Mcneil RN on 10/19/2022 at 4:32 PM

## 2022-11-02 ENCOUNTER — TELEPHONE (OUTPATIENT)
Dept: ENDOCRINOLOGY | Facility: CLINIC | Age: 57
End: 2022-11-02

## 2023-08-01 DIAGNOSIS — E03.9 HYPOTHYROIDISM, UNSPECIFIED TYPE: ICD-10-CM

## 2023-08-04 NOTE — TELEPHONE ENCOUNTER
LEVOTHYROXINE SODIUM 75MCG TABS      Last Written Prescription Date:  10/19/22  Last Fill Quantity: 90,   # refills: 3  Last Office Visit : 10/19/22  Future Office visit:  none    Routing refill request to provider for review/approval because:  Abnormal TSH     Recent Labs   Lab Test 08/16/22  1600   TSH 4.22*

## 2023-08-06 RX ORDER — LEVOTHYROXINE SODIUM 75 UG/1
TABLET ORAL
Qty: 90 TABLET | Refills: 3 | Status: SHIPPED | OUTPATIENT
Start: 2023-08-06

## 2023-09-06 ENCOUNTER — TELEPHONE (OUTPATIENT)
Dept: NEUROSURGERY | Facility: CLINIC | Age: 58
End: 2023-09-06
Payer: COMMERCIAL

## 2023-09-06 NOTE — TELEPHONE ENCOUNTER
[facility] Call Center    Phone Message    May a detailed message be left on voicemail: yes     Reason for Call: Other: Pt called to reschedule her appointment that she missed on . Pt is needing a MRI prior but order has  please place new order. Please call Pt son or daughter to schedule the MRI and return visit on the same day     Vera Alarcon [phone]    Action Taken: Message routed to:  [facility] ([facility] Neurosurgery     Travel Screening: Not Applicable

## 2023-09-06 NOTE — TELEPHONE ENCOUNTER
Writer extended order for patient imaging. Routed to Neurosurgery Clinic Scheduling to coordinate imaging prior to appointment with Dr. Breann Bo LPN  Neurosurgery

## 2023-10-31 ENCOUNTER — OFFICE VISIT (OUTPATIENT)
Dept: NEUROSURGERY | Facility: CLINIC | Age: 58
End: 2023-10-31
Payer: COMMERCIAL

## 2023-10-31 ENCOUNTER — ANCILLARY PROCEDURE (OUTPATIENT)
Dept: MRI IMAGING | Facility: CLINIC | Age: 58
End: 2023-10-31
Attending: NEUROLOGICAL SURGERY
Payer: COMMERCIAL

## 2023-10-31 VITALS
OXYGEN SATURATION: 98 % | TEMPERATURE: 97.9 F | HEART RATE: 59 BPM | WEIGHT: 168 LBS | DIASTOLIC BLOOD PRESSURE: 83 MMHG | HEIGHT: 63 IN | SYSTOLIC BLOOD PRESSURE: 122 MMHG | BODY MASS INDEX: 29.77 KG/M2

## 2023-10-31 DIAGNOSIS — E24.0 CUSHING'S DISEASE (H): ICD-10-CM

## 2023-10-31 DIAGNOSIS — D35.2 PITUITARY ADENOMA (H): ICD-10-CM

## 2023-10-31 DIAGNOSIS — D35.2 PITUITARY ADENOMA (H): Primary | ICD-10-CM

## 2023-10-31 PROCEDURE — 70553 MRI BRAIN STEM W/O & W/DYE: CPT | Mod: GC | Performed by: RADIOLOGY

## 2023-10-31 PROCEDURE — A9585 GADOBUTROL INJECTION: HCPCS | Mod: JZ | Performed by: RADIOLOGY

## 2023-10-31 PROCEDURE — 99213 OFFICE O/P EST LOW 20 MIN: CPT | Performed by: NEUROLOGICAL SURGERY

## 2023-10-31 RX ORDER — GADOBUTROL 604.72 MG/ML
10 INJECTION INTRAVENOUS ONCE
Status: COMPLETED | OUTPATIENT
Start: 2023-10-31 | End: 2023-10-31

## 2023-10-31 RX ADMIN — GADOBUTROL 8 ML: 604.72 INJECTION INTRAVENOUS at 14:47

## 2023-10-31 ASSESSMENT — PAIN SCALES - GENERAL: PAINLEVEL: NO PAIN (0)

## 2023-10-31 NOTE — LETTER
"10/31/2023       RE: Francis Flores  [address]  [address]  [facility] [address]     Dear Colleague,    Thank you for referring your patient, Francis Flores, to the [facility] NEUROSURGERY CLINIC [place] at [facility]. Please see a copy of my visit note below.    [facility]  Department of Neurosurgery      Name: Francis Flores  MRN: [id number]  Age: 58 year old  : 1965  Referring provider: Derrell Crain  10/31/2023      Chief Complaint:   Cushing disease  Pituitary adenoma  S/p Endoscopic endonasal resection of pituitary tumor in 3/25/2019  Follow-up after imaging    History of Present Illness:   Francis Flores is a 58 year old female with a history of Cushing disease and pituitary adenoma, S/p endoscopic endonasal resection of pituitary tumor in 3/2019 who is seen today for a follow-up after imaging.    Today patient presents for the evaluation with her daughter.  [facility]  was present in person during this encounter.  Overall she has been doing well.  She denies any headaches or vision symptoms.    Most recently seen by endocrinology in 2022.  1 year follow-up was recommended at that time.      Review of Systems:   Pertinent items are noted in HPI or as in patient entered ROS below, remainder of complete ROS is negative.       2019    11:44 AM    ENT ROS   Constitutional Appetite change    Unexplained fatigue   Neurology Dizzy spells    Numbness    Unexplained weakness    Headache   Eyes Visual loss    Double vision   Cardiopulmonary Wheezing   Gastrointestinal/Genitourinary Heartburn/indigestion   Musculoskeletal Sore or stiff joints    Back pain    Neck pain    Swollen legs/feet        Physical Exam:   /83 (BP Location: Right arm, Patient Position: Sitting, Cuff Size: Adult Large)   Pulse 59   Temp 97.9  F (36.6  C)   Ht 1.6 m (5' 3\")   Wt 76.2 kg (168 lb)   LMP  " (LMP Unknown)   SpO2 98%   BMI 29.76 kg/m     General: No acute distress.    Neuro: The patient is fully oriented. Speech is normal. Gait is normal.  Psych: Normal mood and affect. Behavior is normal.        Imaging:  10/31/2023 MRI brain:  Impression:  4 mm hypoenhancing focus in the residual right pituitary gland, stable  since brain MRI 2/13/2021. Findings are favored to represent  postsurgical changes and less likely residual tumor. Attention on  follow-up.      Assessment:  Cushing disease  Pituitary adenoma  S/p Endoscopic endonasal resection of pituitary tumor in 3/25/2019  Follow-up after imaging    Plan:  Today's brain MRI shows stable findings compared to previous brain MRI.  We will review this with Dr. Crain and will contact the patient with follow-up plan.  Today patient requested an appointment with ENT for ongoing nasal congestion.  Referral order was placed.  She is overdue to follow up with Dr. Weir in pituitary endocrinology.  She remains on daily thyroid replacement.  Endocrinology referral was placed.       I spent 25 minutes on patient care activities related to this encounter on the date of service, including time spent reviewing the chart, obtaining history and examination and in counseling the patient, and in documentation in the electronic medical record.          Again, thank you for allowing me to participate in the care of your patient.      Sincerely,    Derrell Crain MD

## 2023-10-31 NOTE — PATIENT INSTRUCTIONS
ENT and Endocrinology referral as ordered.     Will call you with MRI Brain results and follow up plan.

## 2023-11-01 NOTE — PROGRESS NOTES
"[facility]  Department of Neurosurgery      Name: Francis Flores  MRN: [id number]  Age: 58 year old  : 1965  Referring provider: Derrell Crain  10/31/2023      Chief Complaint:   Cushing disease  Pituitary adenoma  S/p Endoscopic endonasal resection of pituitary tumor in 3/25/2019  Follow-up after imaging    History of Present Illness:   Francis Flores is a 58 year old female with a history of Cushing disease and pituitary adenoma, S/p endoscopic endonasal resection of pituitary tumor in 3/2019 who is seen today for a follow-up after imaging.    Today patient presents for the evaluation with her daughter.  [place]  was present in person during this encounter.  Overall she has been doing well.  She denies any headaches or vision symptoms.    Most recently seen by endocrinology in 2022.  1 year follow-up was recommended at that time.      Review of Systems:   Pertinent items are noted in HPI or as in patient entered ROS below, remainder of complete ROS is negative.       2019    11:44 AM    ENT ROS   Constitutional Appetite change    Unexplained fatigue   Neurology Dizzy spells    Numbness    Unexplained weakness    Headache   Eyes Visual loss    Double vision   Cardiopulmonary Wheezing   Gastrointestinal/Genitourinary Heartburn/indigestion   Musculoskeletal Sore or stiff joints    Back pain    Neck pain    Swollen legs/feet        Physical Exam:   /83 (BP Location: Right arm, Patient Position: Sitting, Cuff Size: Adult Large)   Pulse 59   Temp 97.9  F (36.6  C)   Ht 1.6 m (5' 3\")   Wt 76.2 kg (168 lb)   LMP  (LMP Unknown)   SpO2 98%   BMI 29.76 kg/m     General: No acute distress.    Neuro: The patient is fully oriented. Speech is normal. Gait is normal.  Psych: Normal mood and affect. Behavior is normal.        Imaging:  10/31/2023 MRI brain:  Impression:  4 mm hypoenhancing focus in the residual right pituitary gland, stable  since " brain MRI 2/13/2021. Findings are favored to represent  postsurgical changes and less likely residual tumor. Attention on  follow-up.      Assessment:  Cushing disease  Pituitary adenoma  S/p Endoscopic endonasal resection of pituitary tumor in 3/25/2019  Follow-up after imaging    Plan:  Today's brain MRI shows stable findings compared to previous brain MRI.  We will review this with Dr. Crain and will contact the patient with follow-up plan.  Today patient requested an appointment with ENT for ongoing nasal congestion.  Referral order was placed.  She is overdue to follow up with Dr. Weir in pituitary endocrinology.  She remains on daily thyroid replacement.  Endocrinology referral was placed.     Addendum on 11/14/2023: Discussed with Dr. Crain who recommended to follow-up in 1 year after brain MRI.     I spent 25 minutes on patient care activities related to this encounter on the date of service, including time spent reviewing the chart, obtaining history and examination and in counseling the patient, and in documentation in the electronic medical record.      Evelyn GOMES, CNP  Department of Neurosurgery

## 2023-11-03 ENCOUNTER — TELEPHONE (OUTPATIENT)
Dept: ENDOCRINOLOGY | Facility: CLINIC | Age: 58
End: 2023-11-03
Payer: COMMERCIAL

## 2023-11-03 NOTE — TELEPHONE ENCOUNTER
Patient call:     Appointment type: RETURN ENDOCRINE   Provider: WANDER  Return date: SEE BELOW   Speciality phone number: [phone]  Additional appointment(s) needed: N/A  Additional notes: LVM, No MyC, letter sent x1    Patient is currently scheduled on 6/28/24 with Dr. Weir as a new endocrine. This Patient needs to get re-scheduled as a return for a sooner appointment. LUIS ARMANDO Burris.     Examples:    12/1 virtual wander mg  12/13 in person wander csc  12/15 virtual wander mg  12/27 in person wander hartman     Please note that the above appointment(s) will require manual scheduling as they are marked as LUIS ARMANDO and will not appear using auto search. Do not schedule the patient if another patient has already been scheduled in the requested appointment slot.     Eileen Heard on 11/3/2023 at 8:23 AM

## 2023-11-30 ENCOUNTER — TELEPHONE (OUTPATIENT)
Dept: ENDOCRINOLOGY | Facility: CLINIC | Age: 58
End: 2023-11-30
Payer: COMMERCIAL

## 2023-11-30 NOTE — TELEPHONE ENCOUNTER
----- Message from Tracie Zayas CMA sent at 2023  2:28 PM CST -----  Regarding: Extended Labs  Pt has an upcoming appt w Dr. Weir but her labs  10/22    Can the following be extended/reordered?    TSH, FT4, A1c, Adrenal corticotropin, Cortisol    Thank you.

## 2023-11-30 NOTE — PROGRESS NOTES
11/30/2023  PATIENT LAB/IMAGING STATUS : Request for extended labs sent to RN  11/30/2023  PATIENT LAB/IMAGING STATUS : Left voicemail to complete standing/future orders  Tracie Zayas CMA

## 2023-12-05 ENCOUNTER — LAB (OUTPATIENT)
Dept: LAB | Facility: CLINIC | Age: 58
End: 2023-12-05
Payer: COMMERCIAL

## 2023-12-05 DIAGNOSIS — D35.2 PITUITARY ADENOMA (H): ICD-10-CM

## 2023-12-05 DIAGNOSIS — D35.2 PITUITARY ADENOMA (H): Primary | ICD-10-CM

## 2023-12-05 DIAGNOSIS — E03.9 HYPOTHYROIDISM, UNSPECIFIED TYPE: ICD-10-CM

## 2023-12-05 LAB
HBA1C MFR BLD: 5.7 %
HOLD SPECIMEN: NORMAL
T4 FREE SERPL-MCNC: 1.5 NG/DL (ref 0.9–1.7)
TSH SERPL DL<=0.005 MIU/L-ACNC: 0.68 UIU/ML (ref 0.3–4.2)

## 2023-12-05 PROCEDURE — 84443 ASSAY THYROID STIM HORMONE: CPT | Performed by: PATHOLOGY

## 2023-12-05 PROCEDURE — 84439 ASSAY OF FREE THYROXINE: CPT | Performed by: PATHOLOGY

## 2023-12-05 PROCEDURE — 99000 SPECIMEN HANDLING OFFICE-LAB: CPT | Performed by: PATHOLOGY

## 2023-12-05 PROCEDURE — 36415 COLL VENOUS BLD VENIPUNCTURE: CPT | Performed by: PATHOLOGY

## 2023-12-05 PROCEDURE — 83036 HEMOGLOBIN GLYCOSYLATED A1C: CPT | Performed by: INTERNAL MEDICINE

## 2023-12-13 ENCOUNTER — OFFICE VISIT (OUTPATIENT)
Dept: ENDOCRINOLOGY | Facility: CLINIC | Age: 58
End: 2023-12-13
Attending: NURSE PRACTITIONER
Payer: COMMERCIAL

## 2023-12-13 VITALS
WEIGHT: 174 LBS | SYSTOLIC BLOOD PRESSURE: 111 MMHG | OXYGEN SATURATION: 100 % | HEART RATE: 75 BPM | DIASTOLIC BLOOD PRESSURE: 71 MMHG | BODY MASS INDEX: 30.82 KG/M2

## 2023-12-13 DIAGNOSIS — E24.0 CUSHING'S DISEASE (H): ICD-10-CM

## 2023-12-13 PROCEDURE — 99214 OFFICE O/P EST MOD 30 MIN: CPT | Mod: GC | Performed by: INTERNAL MEDICINE

## 2023-12-13 RX ORDER — ACETAMINOPHEN 325 MG/1
650 TABLET ORAL
COMMUNITY
Start: 2023-12-01

## 2023-12-13 RX ORDER — ECHINACEA PURPUREA EXTRACT 125 MG
2 TABLET ORAL DAILY PRN
Qty: 30 ML | Refills: 1 | Status: SHIPPED | OUTPATIENT
Start: 2023-12-13

## 2023-12-13 RX ORDER — PREDNISONE 10 MG/1
TABLET ORAL
COMMUNITY
Start: 2023-12-02 | End: 2024-01-25

## 2023-12-13 ASSESSMENT — PAIN SCALES - GENERAL: PAINLEVEL: MODERATE PAIN (4)

## 2023-12-13 NOTE — LETTER
12/13/2023       RE: Francis Flores  [address]  [address]  [facility] [address]     Dear Colleague,    Thank you for referring your patient, Francis Flores, to the [facility] ENDOCRINOLOGY CLINIC [place] at [facility]. Please see a copy of my visit note below.    11/30/2023  PATIENT LAB/IMAGING STATUS : Request for extended labs sent to RN  11/30/2023  PATIENT LAB/IMAGING STATUS : Left voicemail to complete standing/future orders  Tracie Zayas [facility]        Endocrinology Clinic     Francis Flores MRN:[id number] YOB: 1965  Primary care provider: Eliu Dewey     Medical Decision Making:   Mrs. 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     #Cushing disease  -s/p TSS on 3/25/2019  -MRI brain 10/2023 showed stable lesion, likely post op changes  -persistently elevated ACTH, last in 8/2022 was 115. Clinically no hypercortisolism  -A1c 5.7    PLAN:  -repeat ACTH and AM cortisol. Might need urine cortisol if ACTH is elevated  -order sodium    #Hypothyroidism  -TSH 0.68, FT4 1.50  -on levothyroxine 75 mcg daily, try to take it every day  PLAN  -continue levothyroxine 75 mcg daily    Return in about 1 year (around 12/13/2024).      Discussed with Attending  Eliu Nickerson MD  Endocrine Fellow  Pager # [phone]    Attending tie-in note  I saw the patient with endocrine fellow Dr. Nickerson and directly examined patient and discussed. Agree above note and plan.         30 minutes spent on the date of the encounter doing chart review, history and exam, documentation and further activities as noted above.    Tete Weir MD  Staff Physician  Endocrinology and Metabolism  [facility]  License: [id number]  Pager: [phone]     History of Present Illness:   Francis Flores a 58 year old is here for follow up of cushing disease.    Interval  Events:  MRI brain 10/2023:  4 mm hypoenhancing focus in the residual right pituitary gland, stable  since brain MRI 2/13/2021. Findings are favored to represent  postsurgical changes and less likely residual tumor. Attention on  follow-up.     Headache: +ve, on right side, new since 1 week ago. No NV.  Visual problems: blurry +ve, denied double vision. Also endorsed eye pain on both side.  Obesity/weight gain - no weight gain  Feeling warm  Hypertension -ve  Ecchymoses -ve  Feeling tired  A1c 5.7  She stated unable to hold urination.    Feel some weakness in arms, thighs +-1 month  Denied fracture  Losing hair    History of problem:  Briefly, Mrs. Flores  had a pituitary macroadenoma diagnosed in 2008. Patient has had multiple evaluations over the years and was lost to follow-up for a period of time.  She came back to our clinic in 2018, after been evaluated at [facility]. Please see my prior notes for further detail on her prior history.  As patient had confirmed excess ACTH production and a pituitary mass, she was seen by neurosurgery and had a endoscopic Stealth-guided transsphenoidal resection of pituitary tumor on 3/25/2019.  Surgery was not considered curative and she was discharged home with no steroid replacement therapy.  Post operative cortisol did not reached less than 2-3. Pathology compatible to ACTH stained adenoma with fibrous tissues.   Labs from 4/1/2019 were okay. Subsequently the patient presented to clinic reporting nausea and vomiting that started the night prior to her visit, accompanied by abdominal pain.  No fever, no diarrhea, no dysuria. She also reported frontal headache and blood discharge from her nose.  No clear liquid coming out from her nose.  In addition to this one, she visited the ED several times and reported she was not feeling well.  She was placed on a prednisone taper, felt better initially, and has been now off steroids for a while.    ROS:  All 12 systems were reviewed and  negative except as mentioned in HPI    Past Medical/Surgical History:  Past Medical History:   Diagnosis Date    Benign positional vertigo     GERD (gastroesophageal reflux disease)     Head injury     Osteoporosis     Pituitary adenoma (H) 4/13/2012    Sleep apnea     Thyroid disease      Past Surgical History:   Procedure Laterality Date    CYSTOSCOPY BLADDER WITH STONE EXTRACTION Right     IR CAROTID CEREBRAL ANGIOGRAM BILATERAL  11/16/2018    OPTICAL TRACKING SYSTEM ENDOSCOPIC RESECTION TUMOR CRANIAL N/A 3/25/2019    Procedure: Stealth Assisted Endoscopic Transnasal Resection Of Pituitary Tumor;  Surgeon: Derrell Crain MD;  Location: [facility] OR    ORTHOPEDIC SURGERY      knee       Allergies:  No Known Allergies    Current meds:   Current Outpatient Medications   Medication    acetaminophen (TYLENOL) 325 MG tablet    calcium carbonate (OS- MG [place]. CA) 500 tablet    cholecalciferol (VITAMIN D3) 1250 mcg (76350 units) capsule    ibuprofen 200 MG capsule    levothyroxine (SYNTHROID/LEVOTHROID) 75 MCG tablet    predniSONE (DELTASONE) 10 MG tablet    sodium chloride (OCEAN) 0.65 % nasal spray    sodium chloride (OCEAN) 0.65 % nasal spray    VITAMIN D, CHOLECALCIFEROL, PO    cetirizine (ZYRTEC) 10 MG tablet    cyanocobalamin (VITAMIN B-12) 1000 MCG tablet    dexamethasone (DECADRON) 1 MG tablet    levocetirizine (XYZAL) 5 MG tablet    LINZESS 145 MCG capsule    meloxicam (MOBIC) 15 MG tablet    mineral oil-hydrophilic petrolatum (AQUAPHOR) external ointment    Multiple Vitamins-Minerals (CENTROVITE) TABS    naproxen (NAPROSYN) 500 MG tablet    ondansetron (ZOFRAN) 4 MG tablet    pantoprazole sodium 40 MG tablet    polyethylene glycol (MIRALAX/GLYCOLAX) packet    ranitidine (ZANTAC) 150 MG tablet    senna-docusate (SENOKOT-S/PERICOLACE) 8.6-50 MG tablet    tiZANidine (ZANAFLEX) 2 MG tablet     No current facility-administered medications for this visit.       Family History:  Family History   Problem  Relation Age of Onset    No Known Problems Mother     No Known Problems Father          of old age    Glaucoma No family hx of     Macular Degeneration No family hx of      No Family Hx of pituitary disease, asthma or osteoporosis.   Has 4 healthy children, 25 yrs old girl, 20 yrs old twin boys and 17 yrs old boy.        Social History:  Social History     Tobacco Use    Smoking status: Never    Smokeless tobacco: Never   Substance Use Topics    Alcohol use: No       Behavioral history: No tobacco use.   Home environment: No secondhand tobacco smoke in home.   Mrs. Flores does not work.   She lives with her  and their 4 children. Mrs. Flores lives in [place].  She does not smoke or consume alcohol.     Pertinent Physical Exam:   /71   Pulse 75   Wt 78.9 kg (174 lb)   LMP  (LMP Unknown)   SpO2 100%   BMI 30.82 kg/m      General: pleasant, NAD  HEENT: normocephalic, atraumatic. Oral mucous membranes moist.   CV: RRR, HR 75  Lungs: unlabored respiration, no cough  ABD: rounded, nondistended  Skin: warm and dry, no obvious lesions  MSK:  moves all extremities  Lymp:  no LE edema   Mental status:  alert, oriented to self, place, time  Neuro: cranial nerve grossly intact  Psych:  calm and appropriate interaction       Pertinent Labs and Imaging:       Latest Ref Rng 2019  10:20 AM 2020  5:57 PM 2022  4:00 PM 2023  3:23 PM   ENDO [facility]IT[facility]        Adrenal Corticotropin <47 pg/mL 55 (H)   115 (H)     Cortisol Serum ug/dL 1.9 (L)   6.7     Cortisol Free Urine        Cortisol Free Urine Random        Cortisol ug/g creatinine        Estradiol pg/mL       FSH IU/L 45.9       FSH mIU/mL   40.6     Glucose 70 - 99 mg/dL 81  88  83     Growth Hormone <7.0 ug/L 0.1   <0.1     Ins Growth Factor 1 47 - 236 ng/mL 132   54     Lutropin mIU/mL   31.7     Lutropin IU/L 24.0       Urine Osmolality 100 - 1200 mmol/kg       Potassium 3.4 - 5.3 mmol/L 3.4  3.9  3.8     Prolactin 3 - 27 ug/L 8        Sodium 133 - 144 mmol/L 141  140  143     Testosterone Total 8 - 60 ng/dL <2 (L)   8     TSH 0.30 - 4.20 uIU/mL    0.68    TSH 0.40 - 4.00 mU/L 0.24 (L)   4.22 (H)     T4 Free 0.90 - 1.70 ng/dL 1.14   0.91  1.50       Legend:  (H) High  (L) Low  Tete Weir MD

## 2023-12-13 NOTE — PATIENT INSTRUCTIONS
Blood work at 8 AM.    Please reach out to the following centers to schedule your appointment:       Imaging (DEXA, CT, MRI, XRAY)    [facility] ([facility], [facility] [place]) [phone]   [facility] ([place]) [phone]   [facility] ([facility]) [phone]   [facility] ([facility]) [phone]       [facility]    General [phone]   [facility] [phone]   [place] [phone]   [facility]  [phone]   [facility] [phone]   [place] [phone]   [facility]) [phone]   [facility] Walk-In Only   [place] [phone]   [place] [phone]   [place] [phone]   [place] [phone]       Infusion    [facility] [phone]   [place] [phone]   [place] [phone]   [place] [phone]   [place] [phone]   [place] [phone]   [place] [phone]     For any questions, please reach out to the [facility] Endocrinology Clinic Number for assistance: [phone].

## 2023-12-13 NOTE — NURSING NOTE
"Chief Complaint   Patient presents with    Endocrine Problem     Cushing     Vital signs:      BP: 111/71 Pulse: 75     SpO2: 100 %       Weight: 78.9 kg (174 lb)  Estimated body mass index is 30.82 kg/m  as calculated from the following:    Height as of 10/31/23: 1.6 m (5' 3\").    Weight as of this encounter: 78.9 kg (174 lb).        "

## 2023-12-13 NOTE — PROGRESS NOTES
Endocrinology Clinic     Francis Flores MRN:[id number] YOB: 1965  Primary care provider: Eliu Dewey     Medical Decision Making:   Mrs. 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     #Cushing disease  -s/p TSS on 3/25/2019  -MRI brain 10/2023 showed stable lesion, likely post op changes  -persistently elevated ACTH, last in 8/2022 was 115. Clinically no hypercortisolism  -A1c 5.7    PLAN:  -repeat ACTH and AM cortisol. Might need urine cortisol if ACTH is elevated  -order sodium    #Hypothyroidism  -TSH 0.68, FT4 1.50  -on levothyroxine 75 mcg daily, try to take it every day  PLAN  -continue levothyroxine 75 mcg daily    Return in about 1 year (around 12/13/2024).      Discussed with Attending  Eliu Nickerson MD  Endocrine Fellow  Pager # [phone]    Attending tie-in note  I saw the patient with endocrine fellow Dr. Nickerson and directly examined patient and discussed. Agree above note and plan.         30 minutes spent on the date of the encounter doing chart review, history and exam, documentation and further activities as noted above.    Tete Weir MD  Staff Physician  Endocrinology and Metabolism  [facility]  License: [id number]  Pager: [phone]     History of Present Illness:   Francis Flores a 58 year old is here for follow up of cushing disease.    Interval Events:  MRI brain 10/2023:  4 mm hypoenhancing focus in the residual right pituitary gland, stable  since brain MRI 2/13/2021. Findings are favored to represent  postsurgical changes and less likely residual tumor. Attention on  follow-up.     Headache: +ve, on right side, new since 1 week ago. No NV.  Visual problems: blurry +ve, denied double vision. Also endorsed eye pain on both side.  Obesity/weight gain - no weight gain  Feeling warm  Hypertension -ve  Ecchymoses -ve  Feeling tired  A1c 5.7  She stated unable to hold  urination.    Feel some weakness in arms, thighs +-1 month  Denied fracture  Losing hair    History of problem:  Briefly, Mrs. Flores  had a pituitary macroadenoma diagnosed in 2008. Patient has had multiple evaluations over the years and was lost to follow-up for a period of time.  She came back to our clinic in 2018, after been evaluated at [facility]. Please see my prior notes for further detail on her prior history.  As patient had confirmed excess ACTH production and a pituitary mass, she was seen by neurosurgery and had a endoscopic Stealth-guided transsphenoidal resection of pituitary tumor on 3/25/2019.  Surgery was not considered curative and she was discharged home with no steroid replacement therapy.  Post operative cortisol did not reached less than 2-3. Pathology compatible to ACTH stained adenoma with fibrous tissues.   Labs from 4/1/2019 were okay. Subsequently the patient presented to clinic reporting nausea and vomiting that started the night prior to her visit, accompanied by abdominal pain.  No fever, no diarrhea, no dysuria. She also reported frontal headache and blood discharge from her nose.  No clear liquid coming out from her nose.  In addition to this one, she visited the ED several times and reported she was not feeling well.  She was placed on a prednisone taper, felt better initially, and has been now off steroids for a while.    ROS:  All 12 systems were reviewed and negative except as mentioned in HPI    Past Medical/Surgical History:  Past Medical History:   Diagnosis Date    Benign positional vertigo     GERD (gastroesophageal reflux disease)     Head injury     Osteoporosis     Pituitary adenoma (H) 4/13/2012    Sleep apnea     Thyroid disease      Past Surgical History:   Procedure Laterality Date    CYSTOSCOPY BLADDER WITH STONE EXTRACTION Right     IR CAROTID CEREBRAL ANGIOGRAM BILATERAL  11/16/2018    OPTICAL TRACKING SYSTEM ENDOSCOPIC RESECTION TUMOR CRANIAL N/A 3/25/2019     Procedure: Stealth Assisted Endoscopic Transnasal Resection Of Pituitary Tumor;  Surgeon: Derrell Crain MD;  Location: [facility] OR    ORTHOPEDIC SURGERY      knee       Allergies:  No Known Allergies    Current meds:   Current Outpatient Medications   Medication    acetaminophen (TYLENOL) 325 MG tablet    calcium carbonate (OS- MG [place]. [place]) 500 tablet    cholecalciferol (VITAMIN D3) 1250 mcg (96389 units) capsule    ibuprofen 200 MG capsule    levothyroxine (SYNTHROID/LEVOTHROID) 75 MCG tablet    predniSONE (DELTASONE) 10 MG tablet    sodium chloride (OCEAN) 0.65 % nasal spray    sodium chloride (OCEAN) 0.65 % nasal spray    VITAMIN D, CHOLECALCIFEROL, PO    cetirizine (ZYRTEC) 10 MG tablet    cyanocobalamin (VITAMIN B-12) 1000 MCG tablet    dexamethasone (DECADRON) 1 MG tablet    levocetirizine (XYZAL) 5 MG tablet    LINZESS 145 MCG capsule    meloxicam (MOBIC) 15 MG tablet    mineral oil-hydrophilic petrolatum (AQUAPHOR) external ointment    Multiple Vitamins-Minerals (CENTROVITE) TABS    naproxen (NAPROSYN) 500 MG tablet    ondansetron (ZOFRAN) 4 MG tablet    pantoprazole sodium 40 MG tablet    polyethylene glycol (MIRALAX/GLYCOLAX) packet    ranitidine (ZANTAC) 150 MG tablet    senna-docusate (SENOKOT-S/PERICOLACE) 8.6-50 MG tablet    tiZANidine (ZANAFLEX) 2 MG tablet     No current facility-administered medications for this visit.       Family History:  Family History   Problem Relation Age of Onset    No Known Problems Mother     No Known Problems Father          of old age    Glaucoma No family hx of     Macular Degeneration No family hx of      No Family Hx of pituitary disease, asthma or osteoporosis.   Has 4 healthy children, 25 yrs old girl, 20 yrs old twin boys and 17 yrs old boy.        Social History:  Social History     Tobacco Use    Smoking status: Never    Smokeless tobacco: Never   Substance Use Topics    Alcohol use: No       Behavioral history: No tobacco use.   Home  environment: No secondhand tobacco smoke in home.   Mrs. Flores does not work.   She lives with her  and their 4 children. Mrs. Flores lives in [place].  She does not smoke or consume alcohol.     Pertinent Physical Exam:   /71   Pulse 75   Wt 78.9 kg (174 lb)   LMP  (LMP Unknown)   SpO2 100%   BMI 30.82 kg/m      General: pleasant, NAD  HEENT: normocephalic, atraumatic. Oral mucous membranes moist.   CV: RRR, HR 75  Lungs: unlabored respiration, no cough  ABD: rounded, nondistended  Skin: warm and dry, no obvious lesions  MSK:  moves all extremities  Lymp:  no LE edema   Mental status:  alert, oriented to self, place, time  Neuro: cranial nerve grossly intact  Psych:  calm and appropriate interaction       Pertinent Labs and Imaging:       Latest Ref Rng 7/24/2019  10:20 AM 7/29/2020  5:57 PM 8/16/2022  4:00 PM 12/5/2023  3:23 PM   [facility]        Adrenal Corticotropin <47 pg/mL 55 (H)   115 (H)     Cortisol Serum ug/dL 1.9 (L)   6.7     Cortisol Free Urine        Cortisol Free Urine Random        Cortisol ug/g creatinine        Estradiol pg/mL       FSH IU/L 45.9       FSH mIU/mL   40.6     Glucose 70 - 99 mg/dL 81  88  83     Growth Hormone <7.0 ug/L 0.1   <0.1     Ins Growth Factor 1 47 - 236 ng/mL 132   54     Lutropin mIU/mL   31.7     Lutropin IU/L 24.0       Urine Osmolality 100 - 1200 mmol/kg       Potassium 3.4 - 5.3 mmol/L 3.4  3.9  3.8     Prolactin 3 - 27 ug/L 8       Sodium 133 - 144 mmol/L 141  140  143     Testosterone Total 8 - 60 ng/dL <2 (L)   8     TSH 0.30 - 4.20 uIU/mL    0.68    TSH 0.40 - 4.00 mU/L 0.24 (L)   4.22 (H)     T4 Free 0.90 - 1.70 ng/dL 1.14   0.91  1.50       Legend:  (H) High  (L) Low

## 2023-12-14 ENCOUNTER — LAB (OUTPATIENT)
Dept: LAB | Facility: CLINIC | Age: 58
End: 2023-12-14
Payer: COMMERCIAL

## 2023-12-14 DIAGNOSIS — E24.0 CUSHING'S DISEASE (H): ICD-10-CM

## 2023-12-14 LAB
ACTH PLAS-MCNC: 52 PG/ML
CORTIS SERPL-MCNC: 3.7 UG/DL
SODIUM SERPL-SCNC: 140 MMOL/L (ref 135–145)

## 2023-12-14 PROCEDURE — 82024 ASSAY OF ACTH: CPT

## 2023-12-14 PROCEDURE — 82533 TOTAL CORTISOL: CPT

## 2023-12-14 PROCEDURE — 36415 COLL VENOUS BLD VENIPUNCTURE: CPT

## 2023-12-14 PROCEDURE — 84295 ASSAY OF SERUM SODIUM: CPT

## 2023-12-26 ENCOUNTER — TELEPHONE (OUTPATIENT)
Dept: ENDOCRINOLOGY | Facility: CLINIC | Age: 58
End: 2023-12-26
Payer: COMMERCIAL

## 2023-12-26 NOTE — TELEPHONE ENCOUNTER
Patient call:     Appointment type: return endocrine   Provider: Destin   Return date: December 2024   Speciality phone number: [phone]  Additional appointment(s) needed:   Additional notes: LVM and sent letter x1   Waiting for letter translation     Batool Degroot on 12/26/2023 at 4:21 PM

## 2024-01-03 ENCOUNTER — TELEPHONE (OUTPATIENT)
Dept: ENDOCRINOLOGY | Facility: CLINIC | Age: 59
End: 2024-01-03
Payer: COMMERCIAL

## 2024-01-03 NOTE — TELEPHONE ENCOUNTER
Patient call:      Appointment type: return endocrine   Provider: Destin   Return date: December 2024 1 yr follow-up   Speciality phone number: [phone]  Additional appointment(s) needed:   Additional notes: LVM x2 and sent letter x1   Waiting for letter translation    Eileen Heard on 1/3/2024 at 3:14 PM

## 2024-01-05 NOTE — TELEPHONE ENCOUNTER
FUTURE VISIT INFORMATION      FUTURE VISIT INFORMATION:  Date: 3/11/24  Time: 12PM  Location: [facility] [place]  REFERRAL INFORMATION:  Referring provider:  Evelyn Rodriguez APRN CNP  Referring providers clinic:  [facility]  Reason for visit/diagnosis  Per Earnestine Bui, please call and assist with scheduling patient with Leidy Figueroa, please offer Tiffanie if Dr. Figueroa is booked out too far.  ref by Evelyn Rodriguez APRN CNP  recs in [facility]  conf loc  sched w/daughter     RECORDS REQUESTED FROM:       Clinic name Comments Records Status Imaging Status   [facility] 10/31/23- ov Evelyn Rodriguez APRN CNP EPIC     IMAGING  10/31/23- MR BRAIN   8/16/22- MR BRAIN  [facility]  PACS    [facility]  4/6/20, 1/6/20- OV WPricila FIGUEROA [facility]

## 2024-03-11 ENCOUNTER — PRE VISIT (OUTPATIENT)
Dept: OTOLARYNGOLOGY | Facility: CLINIC | Age: 59
End: 2024-03-11

## 2024-06-13 ENCOUNTER — TELEPHONE (OUTPATIENT)
Dept: ENDOCRINOLOGY | Facility: CLINIC | Age: 59
End: 2024-06-13
Payer: COMMERCIAL

## 2024-06-13 NOTE — TELEPHONE ENCOUNTER
Patient confirmed scheduled appointment:  Date: 6/13  Time: 6:45 am   Visit type: Labs   Provider: [place]  [place] [facility]  Testing/imaging: NA   Additional notes: Spoke to pt and scheduled per below message. Pt stated they want to follow-up with Neuro asap. Will send their team a message     CCs: please help schedule lab draw. Dr Nickerson and Dr Weir had recommended a return visit DEC 2024. We can schedule sooner based on lab results. Thank you.  Chely Patterson RN CC on 6/13/2024 at 2:37 PM     Eileen Heard on 6/13/2024 at 3:23 PM

## 2024-06-14 ENCOUNTER — LAB (OUTPATIENT)
Dept: LAB | Facility: CLINIC | Age: 59
End: 2024-06-14
Payer: COMMERCIAL

## 2024-06-14 DIAGNOSIS — D35.2 PITUITARY ADENOMA (H): ICD-10-CM

## 2024-06-14 LAB
CORTIS SERPL-MCNC: 12 UG/DL
HBA1C MFR BLD: 5.7 %
T4 FREE SERPL-MCNC: 0.64 NG/DL (ref 0.9–1.7)
TSH SERPL DL<=0.005 MIU/L-ACNC: 5.11 UIU/ML (ref 0.3–4.2)

## 2024-06-14 PROCEDURE — 99000 SPECIMEN HANDLING OFFICE-LAB: CPT | Performed by: PATHOLOGY

## 2024-06-14 PROCEDURE — 84443 ASSAY THYROID STIM HORMONE: CPT | Performed by: PATHOLOGY

## 2024-06-14 PROCEDURE — 84439 ASSAY OF FREE THYROXINE: CPT | Performed by: PATHOLOGY

## 2024-06-14 PROCEDURE — 36415 COLL VENOUS BLD VENIPUNCTURE: CPT | Performed by: PATHOLOGY

## 2024-06-14 PROCEDURE — 82533 TOTAL CORTISOL: CPT | Performed by: INTERNAL MEDICINE

## 2024-06-14 PROCEDURE — 83036 HEMOGLOBIN GLYCOSYLATED A1C: CPT | Performed by: INTERNAL MEDICINE

## 2024-06-17 ENCOUNTER — TELEPHONE (OUTPATIENT)
Dept: ENDOCRINOLOGY | Facility: CLINIC | Age: 59
End: 2024-06-17
Payer: COMMERCIAL

## 2024-06-17 DIAGNOSIS — E03.9 HYPOTHYROIDISM, UNSPECIFIED TYPE: Primary | ICD-10-CM

## 2024-06-17 RX ORDER — LEVOTHYROXINE SODIUM 88 UG/1
88 TABLET ORAL DAILY
Qty: 90 TABLET | Refills: 3 | Status: SHIPPED | OUTPATIENT
Start: 2024-06-17

## 2024-06-17 NOTE — TELEPHONE ENCOUNTER
Free T4 0.64, lower side, has been on stable dose of LT4 75 mcg however she is currently on rifampicin, which may reduce the concentration of LT4, we will increase LT4 from 75 mcg to 88 mcg and recheck the TFT in 6 weeks.     Tete Weir MD.

## 2024-06-17 NOTE — TELEPHONE ENCOUNTER
Dr Weir and Dr Nickerson notified.   Chely Patterson, RN on 6/17/2024 at 8:07 AM           RE    Call Back, Appointment  (Newest Message First)  View All Conversations on this Encounter   You routed conversation to You3 minutes ago (8:00 AM)      Kaley Daniel RN Davis, Marnie E, PA-C; Dianna Mccallum, RN; Eileen Heard; Tete Weir MD; Neurosurgery Clinic Scheduling3 days ago     AM  Scheduling:    Please schedule In Clinic Office Visit with Kristina OLSON with MRI Brain with and without contrast already ordered prior.  Patient will need  for the In Clinic OV, noted in Epic  This is NOT a STAT MRI, this is a Routine MRI to be scheduled.      Thank you  Tete Salinas MD3 days ago     DM  Please review lab results and advise on when/if to schedule with you. Thank you.      Eileen Heard  [facility] Adult [facility]3 days ago     ED  Hello,    Once lab results are in let me know how to further schedule this patient. Thank you.      Kristina Arriaza PA-C Messer, Ann, RN; Dianna Mccallum, MISSY; Eileen Heard; Tete Weir MD3 days ago     MD Eduardo Roche,    I'm happy to see Ms. Flores with an MRI prior. Of note, she did have a head CT done less than 1 month ago which looked ok. I think it's very important for her to schedule with Dr. Weir as well with her Cushing's diagnosis. It looks like lab work was done today.    Can we encourage her to schedule both please?    Thanks,  Kristina

## 2024-06-20 ENCOUNTER — APPOINTMENT (OUTPATIENT)
Dept: INTERPRETER SERVICES | Facility: CLINIC | Age: 59
End: 2024-06-20
Payer: COMMERCIAL

## 2024-06-20 ENCOUNTER — TELEPHONE (OUTPATIENT)
Dept: NEUROSURGERY | Facility: CLINIC | Age: 59
End: 2024-06-20
Payer: COMMERCIAL

## 2024-06-20 NOTE — TELEPHONE ENCOUNTER
Left Voicemail (1st Attempt) for the patient to call back and schedule the following:    Appointment type: Rtn tumor neurosurg - in person  Provider: Kristina Arriaza  Return date: On a Friday - middle to end of July 2024  Specialty phone number: [phone]  Additional appointment(s) needed: MRI prior  Additonal Notes: Pituitary adenoma/ MRI prior

## 2024-07-16 NOTE — DISCHARGE INSTRUCTIONS
Addended by: ORLANDO UREÑA on: 7/16/2024 05:33 PM     Modules accepted: Orders     MRI Contrast Discharge Instructions    The IV contrast you received today will pass out of your body in your  urine. This will happen in the next 24 hours. You will not feel this process.  Your urine will not change color.    Drink at least 4 extra glasses of water or juice today (unless your doctor  has restricted your fluids). This reduces the stress on your kidneys.  You may take your regular medicines.    If you are on dialysis: It is best to have dialysis today.    If you have a reaction: Most reactions happen right away. If you have  any new symptoms after leaving the hospital (such as hives or swelling),  call your hospital at the correct number below. Or call your family doctor.  If you have breathing distress or wheezing, call 911.    Special instructions: ***    I have read and understand the above information.    Signature:______________________________________ Date:___________    Staff:__________________________________________ Date:___________     Time:__________    [facility] Departments:    ___[place]: [phone]  ___[facility]: [phone]  ___[place]: [phone] ___[facility]: [phone]  ___[facility]: [phone]  ___[facility]: [phone]  ___[phone]  ___[facility]: [phone]  ___[phone]

## 2024-07-19 ENCOUNTER — ANCILLARY PROCEDURE (OUTPATIENT)
Dept: MRI IMAGING | Facility: CLINIC | Age: 59
End: 2024-07-19
Attending: NURSE PRACTITIONER
Payer: COMMERCIAL

## 2024-07-19 DIAGNOSIS — D35.2 PITUITARY ADENOMA (H): ICD-10-CM

## 2024-07-19 PROCEDURE — 70553 MRI BRAIN STEM W/O & W/DYE: CPT | Mod: GC | Performed by: STUDENT IN AN ORGANIZED HEALTH CARE EDUCATION/TRAINING PROGRAM

## 2024-07-19 PROCEDURE — A9585 GADOBUTROL INJECTION: HCPCS | Mod: JZ | Performed by: STUDENT IN AN ORGANIZED HEALTH CARE EDUCATION/TRAINING PROGRAM

## 2024-07-19 RX ORDER — GADOBUTROL 604.72 MG/ML
7.5 INJECTION INTRAVENOUS ONCE
Status: COMPLETED | OUTPATIENT
Start: 2024-07-19 | End: 2024-07-19

## 2024-07-19 RX ADMIN — GADOBUTROL 7.5 ML: 604.72 INJECTION INTRAVENOUS at 18:23

## 2024-07-19 NOTE — DISCHARGE INSTRUCTIONS
MRI Contrast Discharge Instructions    The IV contrast you received today will pass out of your body in your  urine. This will happen in the next 24 hours. You will not feel this process.  Your urine will not change color.    Drink at least 4 extra glasses of water or juice today (unless your doctor  has restricted your fluids). This reduces the stress on your kidneys.  You may take your regular medicines.    If you are on dialysis: It is best to have dialysis today.    If you have a reaction: Most reactions happen right away. If you have  any new symptoms after leaving the hospital (such as hives or swelling),  call your hospital at the correct number below. Or call your family doctor.  If you have breathing distress or wheezing, call 911.    Special instructions: ***    I have read and understand the above information.    Signature:______________________________________ Date:___________    Staff:__________________________________________ Date:___________     Time:__________    [facility] Departments:    ___[place]: [phone]  ___[facility]: [phone]  ___[place]: [phone] ___[facility]: [phone]  ___[facility]: [phone]  ___[facility]: [phone]  ___[phone]  ___[facility]: [phone]  ___[phone]

## 2024-07-26 ENCOUNTER — OFFICE VISIT (OUTPATIENT)
Dept: NEUROSURGERY | Facility: CLINIC | Age: 59
End: 2024-07-26
Payer: COMMERCIAL

## 2024-07-26 VITALS
OXYGEN SATURATION: 96 % | WEIGHT: 173 LBS | DIASTOLIC BLOOD PRESSURE: 71 MMHG | HEART RATE: 69 BPM | SYSTOLIC BLOOD PRESSURE: 106 MMHG | BODY MASS INDEX: 30.65 KG/M2

## 2024-07-26 DIAGNOSIS — E24.0 CUSHING'S DISEASE (H): ICD-10-CM

## 2024-07-26 DIAGNOSIS — D35.2 PITUITARY ADENOMA (H): Primary | ICD-10-CM

## 2024-07-26 PROCEDURE — 99213 OFFICE O/P EST LOW 20 MIN: CPT | Performed by: PHYSICIAN ASSISTANT

## 2024-07-26 NOTE — PROGRESS NOTES
[facility]  Department of Neurosurgery  Center for Skull Base and Pituitary Surgery    Name: Francis Flores  MRN: [id number]  Age: 59 year old  : 2024      Chief Complaint:   Cushing's disease s/p transnasal endoscopic approach for resection of a pituitary adenoma 3/25/2019, follow up    History of Present Illness:   Francis Flores is a pleasant 59 year old female with a history of a pituitary adenoma causing Cushing's disease, s/p resection of this tumor 3/2019 by Dr. Crain. She's here today accompanied by her daughter for annual follow up. Our visit was completed with the help of a [employer]  by [employer]. The patient reports progressive blurry vision in both eyes. She had a recent Optometry exam and was told she had dry eye. She has not had Ophthalmology follow up with us since . At that time Dr. Neville reported signs of early cataracts and recommended she follow up with Dr. Spear in 1 year. She feels otherwise well and has not had trouble with new headaches or other complaints. She follows with Dr. 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    Review of Systems:   Pertinent items are noted in HPI or as in patient entered ROS below, remainder of complete ROS is negative.     Physical Exam:   /71 (BP Location: Right arm, Patient Position: Sitting, Cuff Size: Adult Regular)   Pulse 69   Wt 78.5 kg (173 lb)   LMP  (LMP Unknown)   SpO2 96%   BMI 30.65 kg/m     General: No acute distress.    Eyes: Conjunctivae are normal.  MSK: Moves all extremities.  No obvious deformity.  Neuro: The patient is fully oriented. Speech is normal. Facial nerve function is normal, rated as a House Brackmann 1. Gait is normal.   Psych: Normal mood and affect. Behavior is normal.      Imaging:  We reviewed her MRI done 2024 which reveals stable postoperative changes without evidence of pituitary adenoma recurrence.       Assessment:  Cushing's disease s/p transnasal endoscopic approach for resection of a pituitary adenoma 3/25/2019, follow up    Plan:  We reviewed the MRI results today which are stable. I recommended follow up in 1 year, sooner with any new concerns, with MRI prior.        Kristina Arriaza PA-C  Department of Neurosurgery

## 2024-09-08 ENCOUNTER — HEALTH MAINTENANCE LETTER (OUTPATIENT)
Age: 59
End: 2024-09-08

## 2024-09-13 ENCOUNTER — TELEPHONE (OUTPATIENT)
Dept: OPHTHALMOLOGY | Facility: CLINIC | Age: 59
End: 2024-09-13
Payer: COMMERCIAL

## 2024-09-18 ENCOUNTER — OFFICE VISIT (OUTPATIENT)
Dept: OPHTHALMOLOGY | Facility: CLINIC | Age: 59
End: 2024-09-18
Payer: COMMERCIAL

## 2024-09-18 DIAGNOSIS — H02.88A MEIBOMIAN GLAND DYSFUNCTION (MGD) OF UPPER AND LOWER LIDS OF BOTH EYES: ICD-10-CM

## 2024-09-18 DIAGNOSIS — H25.13 NUCLEAR SCLEROSIS OF BOTH EYES: ICD-10-CM

## 2024-09-18 DIAGNOSIS — H52.03 HYPERMETROPIA OF BOTH EYES: ICD-10-CM

## 2024-09-18 DIAGNOSIS — D35.2 PITUITARY ADENOMA (H): Primary | ICD-10-CM

## 2024-09-18 DIAGNOSIS — H53.10 SUBJECTIVE VISION DISTURBANCE, LEFT: ICD-10-CM

## 2024-09-18 DIAGNOSIS — H02.88B MEIBOMIAN GLAND DYSFUNCTION (MGD) OF UPPER AND LOWER LIDS OF BOTH EYES: ICD-10-CM

## 2024-09-18 PROCEDURE — 92004 COMPRE OPH EXAM NEW PT 1/>: CPT | Performed by: OPTOMETRIST

## 2024-09-18 PROCEDURE — 92015 DETERMINE REFRACTIVE STATE: CPT | Performed by: OPTOMETRIST

## 2024-09-18 ASSESSMENT — CONF VISUAL FIELD
OD_SUPERIOR_NASAL_RESTRICTION: 0
OD_INFERIOR_TEMPORAL_RESTRICTION: 0
OS_NORMAL: 1
OS_INFERIOR_NASAL_RESTRICTION: 0
OD_NORMAL: 1
OD_INFERIOR_NASAL_RESTRICTION: 0
OD_SUPERIOR_TEMPORAL_RESTRICTION: 0
OS_INFERIOR_TEMPORAL_RESTRICTION: 0
METHOD: COUNTING FINGERS
OS_SUPERIOR_TEMPORAL_RESTRICTION: 0
OS_SUPERIOR_NASAL_RESTRICTION: 0

## 2024-09-18 ASSESSMENT — EXTERNAL EXAM - RIGHT EYE: OD_EXAM: NORMAL

## 2024-09-18 ASSESSMENT — CUP TO DISC RATIO
OS_RATIO: 0.3
OD_RATIO: 0.3

## 2024-09-18 ASSESSMENT — SLIT LAMP EXAM - LIDS
COMMENTS: 2+ MGD
COMMENTS: 2+ MGD

## 2024-09-18 ASSESSMENT — TONOMETRY
OD_IOP_MMHG: 10
OS_IOP_MMHG: 11
IOP_METHOD: ICARE

## 2024-09-18 ASSESSMENT — REFRACTION_MANIFEST
OD_CYLINDER: SPHERE
OS_CYLINDER: +0.50
OD_ADD: +2.50
OS_AXIS: 078
OD_SPHERE: +1.00
OS_ADD: +2.50
OS_SPHERE: +1.25

## 2024-09-18 ASSESSMENT — REFRACTION_WEARINGRX
OS_SPHERE: +0.75
OS_CYLINDER: +0.75
OS_ADD: +2.25
OS_AXIS: 073
OD_ADD: +2.25
OD_CYLINDER: SPHERE
OD_SPHERE: +1.00

## 2024-09-18 ASSESSMENT — EXTERNAL EXAM - LEFT EYE: OS_EXAM: NORMAL

## 2024-09-18 ASSESSMENT — VISUAL ACUITY
OS_CC: 20/25
OD_CC: 20/20
METHOD: TUMBLING E'S

## 2024-09-18 NOTE — PROGRESS NOTES
History  HPI       Annual Eye Exam    In both eyes.  This started 7 years ago.  Since onset it is stable.             Comments    Spoke to patient with use of an  via tablet.  Last seen in 2021 for annual exam in the setting of pituitary adenoma s/p resection 3/25/2019.  Most recent MRI showed no evidence of recurrence.  Has noticed changes in vision in both eyes, left eye is worse than right eye.  This is more up close than in the distance.  In the morning has pain and feels like eyes popping out.  Lasts for about 2 hours every morning.  This began over the last year.  Feels like foreign body sensation and feels like eyes are far apart.  Denies diplopia.  Possibly some floaters that noticed when reading as it feels like there is something hiding the object looking at from seeing clearly.     Sri Vasquez on 9/18/2024 at 7:50 AM            Last edited by Alberto Spear OD on 9/18/2024  8:46 AM.          Assessment/Plan  (D35.2) Pituitary adenoma (H)  (primary encounter diagnosis)  Comment: s/p resection, history of unreliable visual field  Plan:  Educated patient on clinical findings and the importance of continued management with neurology. Continue management as directed and return to clinic in 1 year for dilated exam, or sooner, as needed. Copy of chart sent to Dr. Crain and Kristina Arriaza.    (H52.03) Hypermetropia of both eyes  Comment: Hyperopia with presbyopia both eyes, stable  Plan: REFRACTION         Dispensed spectacle prescription for full time wear. Monitor annually.    (H25.13) Nuclear sclerosis of both eyes  Comment: Not visually significant  Plan:  No treatment indicated at this time. Monitor annually.    (H02.88A,  H02.88B) Meibomian gland dysfunction (MGD) of upper and lower lids of both eyes  Comment: Symptomatic with burning and irritation  Plan:  Recommended warm compresses twice daily for ten minutes and artificial tears as needed. Monitor annually, or sooner if symptoms  "worsen.    (H53.10) Subjective vision disturbance, left  Comment: Initially reported a \"spot\" in the vision during refraction   Exam largely unremarkable (no Villanueva' ring, syneresis noted)   When questioned on this late, the patient denied the spot, and reported discomfort  Plan:  Explained that discomfort should improve with above plan. Monitor as needed.    Return to clinic in 1 year for comprehensive eye exam.    Complete documentation of historical and exam elements from today's encounter can  be found in the full encounter summary report (not reduplicated in this progress  note). I personally obtained the chief complaint(s) and history of present illness. I  confirmed and edited as necessary the review of systems, past medical/surgical  history, family history, social history, and examination findings as documented by  others; and I examined the patient myself. I personally reviewed the relevant tests,  images, and reports as documented above. I formulated and edited as necessary the  assessment and plan and discussed the findings and management plan with the  patient and family.    Alberto Spear, NORTH, FAAO  "

## 2024-09-18 NOTE — NURSING NOTE
Chief Complaints and History of Present Illnesses   Patient presents with    Annual Eye Exam     Chief Complaint(s) and History of Present Illness(es)       Annual Eye Exam              Laterality: both eyes    Onset: 7 years ago    Course: stable              Comments    Spoke to patient with use of an  via tablet.  Last seen in 2017 for annual exam in the setting of pituitary adenoma s/p resection 3/25/2019.  Most recent MRI showed no evidence of recurrence.  Has noticed changes in vision in both eyes, left eye is worse than right eye.  This is more up close than in the distance.  In the morning has pain and feels like eyes popping out.  Lasts for about 2 hours every morning.  This began over the last year.  Feels like foreign body sensation and feels like eyes are far apart.  Denies diplopia.  Possibly some floaters that noticed when reading as it feels like there is something hiding the object looking at from seeing clearly.     Sri Vasquez on 9/18/2024 at 7:50 AM

## 2025-07-26 ENCOUNTER — ANCILLARY PROCEDURE (OUTPATIENT)
Dept: MRI IMAGING | Facility: CLINIC | Age: 60
End: 2025-07-26
Attending: PHYSICIAN ASSISTANT
Payer: COMMERCIAL

## 2025-07-26 DIAGNOSIS — E24.0 CUSHING'S DISEASE (H): ICD-10-CM

## 2025-07-26 DIAGNOSIS — D35.2 PITUITARY ADENOMA (H): ICD-10-CM

## 2025-07-26 PROCEDURE — A9585 GADOBUTROL INJECTION: HCPCS | Mod: JW | Performed by: RADIOLOGY

## 2025-07-26 PROCEDURE — 70553 MRI BRAIN STEM W/O & W/DYE: CPT | Performed by: RADIOLOGY

## 2025-07-26 RX ORDER — GADOBUTROL 604.72 MG/ML
10 INJECTION INTRAVENOUS ONCE
Status: COMPLETED | OUTPATIENT
Start: 2025-07-26 | End: 2025-07-26

## 2025-07-26 RX ADMIN — GADOBUTROL 8 ML: 604.72 INJECTION INTRAVENOUS at 14:09

## 2025-07-26 NOTE — DISCHARGE INSTRUCTIONS

## 2025-07-30 ENCOUNTER — OFFICE VISIT (OUTPATIENT)
Dept: NEUROSURGERY | Facility: CLINIC | Age: 60
End: 2025-07-30
Attending: PHYSICIAN ASSISTANT
Payer: COMMERCIAL

## 2025-07-30 ENCOUNTER — TELEPHONE (OUTPATIENT)
Dept: ENDOCRINOLOGY | Facility: CLINIC | Age: 60
End: 2025-07-30

## 2025-07-30 VITALS
OXYGEN SATURATION: 98 % | HEART RATE: 74 BPM | RESPIRATION RATE: 16 BRPM | DIASTOLIC BLOOD PRESSURE: 80 MMHG | SYSTOLIC BLOOD PRESSURE: 125 MMHG

## 2025-07-30 DIAGNOSIS — D35.2 PITUITARY ADENOMA (H): Primary | ICD-10-CM

## 2025-07-30 DIAGNOSIS — E24.0 CUSHING'S DISEASE (H): ICD-10-CM

## 2025-07-30 PROCEDURE — 3079F DIAST BP 80-89 MM HG: CPT | Performed by: PHYSICIAN ASSISTANT

## 2025-07-30 PROCEDURE — 99213 OFFICE O/P EST LOW 20 MIN: CPT | Performed by: PHYSICIAN ASSISTANT

## 2025-07-30 PROCEDURE — 3074F SYST BP LT 130 MM HG: CPT | Performed by: PHYSICIAN ASSISTANT

## 2025-07-30 NOTE — PROGRESS NOTES
[facility]  Department of Neurosurgery  Center for Skull Base and Pituitary Surgery    Name: Francis Flores  MRN: [id number]  Age: 60 year old  : 2025      Chief Complaint:   Cushing's disease s/p transnasal endoscopic approach for resection of a pituitary adenoma 3/25/2019, follow up     History of Present Illness:   Francis Flores is a 60 year old female with a history of pituitary adenoma causing Cushing's disease, s/p resection of this tumor 3/2019 by Dr. Crain who is here today for annual follow up. She is feeling well today. She denies new headaches, fatigue, weight gain, though these symptoms have never been a problem for her in the past. The visit today is completed with the help of a [place]  by video.  Of note, she is overdue for Endocrinology follow up.      Review of Systems:   Pertinent items are noted in HPI or as in patient entered ROS below, remainder of complete ROS is negative.     Physical Exam:   /80 (BP Location: Left arm, Patient Position: Sitting)   Pulse 74   Resp 16   LMP  (LMP Unknown)   SpO2 98%   General: No acute distress.    Eyes: Conjunctivae are normal.  MSK: Moves all extremities.  No obvious deformity.  Neuro: The patient is fully oriented. Speech is normal. Facial nerve function is normal, rated as a House Brackmann 1. Gait is normal.   Psych: Normal mood and affect. Behavior is normal.      Imaging:  We reviewed the MRI completed 2025 revealing stable postsurgical changes without evidence of recurrent sellar lesion.      Assessment:  Cushing's disease s/p transnasal endoscopic approach for resection of a pituitary adenoma 3/25/2019, follow up     Plan:  We reviewed the MRI findings today which are stable. We'll plan to see her in 1 year with repeat imaging, and in the meantime will reach out to Endocrinology to get her follow up scheduled with them as well.        Kristina Arriaza PA-C  Department of Neurosurgery

## 2025-07-30 NOTE — LETTER
2025       RE: Francis Flores  [address]  [place] [address]     Dear Colleague,    Thank you for referring your patient, Francis Flores, to the [facility] [place] at [facility]. Please see a copy of my visit note below.      [facility]  Department of Neurosurgery  Center for Skull Base and Pituitary Surgery    Name: Francis Flores  MRN: [id number]  Age: 60 year old  : 2025      Chief Complaint:   Cushing's disease s/p transnasal endoscopic approach for resection of a pituitary adenoma 3/25/2019, follow up     History of Present Illness:   Francis Flores is a 60 year old female with a history of pituitary adenoma causing Cushing's disease, s/p resection of this tumor 3/2019 by Dr. Crain who is here today for annual follow up. She is feeling well today. She denies new headaches, fatigue, weight gain, though these symptoms have never been a problem for her in the past. The visit today is completed with the help of a [facility]  by video.  Of note, she is overdue for Endocrinology follow up.      Review of Systems:   Pertinent items are noted in HPI or as in patient entered ROS below, remainder of complete ROS is negative.     Physical Exam:   /80 (BP Location: Left arm, Patient Position: Sitting)   Pulse 74   Resp 16   LMP  (LMP Unknown)   SpO2 98%   General: No acute distress.    Eyes: Conjunctivae are normal.  MSK: Moves all extremities.  No obvious deformity.  Neuro: The patient is fully oriented. Speech is normal. Facial nerve function is normal, rated as a House Brackmann 1. Gait is normal.   Psych: Normal mood and affect. Behavior is normal.      Imaging:  We reviewed the MRI completed 2025 revealing stable postsurgical changes without evidence of recurrent sellar lesion.      Assessment:  Cushing's disease s/p transnasal endoscopic approach for  resection of a pituitary adenoma 3/25/2019, follow up     Plan:  We reviewed the MRI findings today which are stable. We'll plan to see her in 1 year with repeat imaging, and in the meantime will reach out to Endocrinology to get her follow up scheduled with them as well.        Kristina Arriaza PA-C  Department of Neurosurgery      Again, thank you for allowing me to participate in the care of your patient.      Sincerely,    Kristina Arriaza PA-C

## 2025-07-30 NOTE — TELEPHONE ENCOUNTER
Patient confirmed scheduled appointment:  Date: 5/27/2026  Time: 7:30 am  Visit type: RETURN ENDOCRINE  Provider: Tete Weir MD  Location: [facility] DI[facility]ETES  Testing/imaging: N/A  Additional notes:  Spoke to patient's daughter / patient and scheduled first available per message below.  Aliya did not OK LUIS ARMANDO but said she wouldn't need a referral so scheduled as a return endo.    Chely Patterson, RN  [id number]  She had visit DEC 2023 - is that ok?          Previous Messages       ----- Message -----  From: Jaden Gomez  Sent: 7/30/2025   1:59 PM CDT  To: [facility]  Subject: FW: pituitary/cushing's follow up                Dr Destin Gay last saw this patient in October of 2022.  We would have to be able to get them in soon otherwise we would need a new referral.  Dr Weir's first available is in April of 2026 for a return endocrine appointment.  Please advise how to schedule this patient.  Thank you for your assistance.  ----- Message -----  From: Kristina Arriaza PA-C  Sent: 7/30/2025   1:35 PM CDT  To: Kasia Rios RN; Tete Weir MD; Cl*  Subject: pituitary/cushing's follow up                    Hi,    This patient of Dr. Miles is due for a pituitary follow up with Dr. Weir. Could we please work on getting her in within perhaps the next 6 months?    Thanks,  Kristina Gomez on 7/30/2025 at 2:51 PM

## (undated) DEVICE — SU CHROMIC 4-0 RB-1 27" U203H

## (undated) DEVICE — SU PLAIN 4-0 SC-1 18" 1824H

## (undated) DEVICE — DRAPE SHEET MED 44X70" 9355

## (undated) DEVICE — SPONGE SURGIFOAM 100 1974

## (undated) DEVICE — GLOVE PROTEXIS MICRO 7.0  2D73PM70

## (undated) DEVICE — RX BACITRACIN OINTMENT 0.9G 1/32OZ CUR001109

## (undated) DEVICE — ESU SUCTION CAUTERY 10FR FOOT CONTROL E2505-10FR

## (undated) DEVICE — ENDO SHEATH STORZ SHARPSITE ENDOSCRUB 0DEG 4MM 1912000

## (undated) DEVICE — LINEN TOWEL PACK X6 WHITE 5487

## (undated) DEVICE — MARKER SPHERES PASSIVE MEDT PACK 5 8801075

## (undated) DEVICE — PREP DURAPREP 26ML APL 8630

## (undated) DEVICE — ESU ELEC BLADE 6" COATED E1450-6

## (undated) DEVICE — ESU GROUND PAD ADULT W/CORD E7507

## (undated) DEVICE — PACK NEURO MINOR UMMC SNE32MNMU4

## (undated) DEVICE — DRAPE WARMER 66X44" ORS-300

## (undated) DEVICE — SUCTION MANIFOLD DORNOCH ULTRA CART UL-CL500

## (undated) DEVICE — PACK GOWN 3/PK DISP XL SBA32GPFCB

## (undated) DEVICE — SOL RINGERS LACTATED 1000ML BAG 07953-09

## (undated) DEVICE — SPONGE COTTONOID 1/2X3" 20-07S

## (undated) DEVICE — PACKING NASAL 4.5CM W/O AIRWAY 440400

## (undated) DEVICE — DRSG TELFA 3X8" 1238

## (undated) DEVICE — CATH TRAY FOLEY SURESTEP 16FR W/TMP PRB STLK LATEX A319416AM

## (undated) DEVICE — SPONGE SURGIFOAM 01GM POWDER 1978

## (undated) DEVICE — SU VICRYL 4-0 RB-1 27" J304

## (undated) DEVICE — PIN SKULL MAYFIELD ADULT TITANIUM 3/PK A1120

## (undated) DEVICE — LINEN TOWEL PACK X5 5464

## (undated) DEVICE — BLADE KNIFE SURG 11 371111

## (undated) DEVICE — DRSG GAUZE 4X4" TRAY 6939

## (undated) DEVICE — NDL 25GA 2"  8881200441

## (undated) DEVICE — SPONGE COTTONOID 1/2X1/2" 20-04S

## (undated) DEVICE — ESU MINI EPIDURAL VEIN SEALER AQUAMANTIS 3.4MM 23-314-1

## (undated) DEVICE — EYE PREP BETADINE 5% SOLUTION 30ML 0065-0411-30

## (undated) DEVICE — SYR 03ML LL W/O NDL 309657

## (undated) DEVICE — BUR BALL 3MM FLUTED 15CM ANSPACH MA15-3SB

## (undated) DEVICE — SURGICEL HEMOSTAT 4X8" 1952

## (undated) DEVICE — SOL WATER IRRIG 1000ML BOTTLE 2F7114

## (undated) DEVICE — LINEN TOWEL PACK X30 5481

## (undated) DEVICE — BUR BALL 3MM DIAMOND 15CM ANSPACH MA15-3SD

## (undated) DEVICE — COVER CAMERA VIDEO LASER 9X96" 04-CC227

## (undated) DEVICE — DECANTER BAG 2002S

## (undated) DEVICE — ESU ELEC NDL 6" COATED/INSULATED E1465-6

## (undated) DEVICE — DRAPE POUCH INSTRUMENT 1018

## (undated) DEVICE — GOWN XLG DISP 9545

## (undated) RX ORDER — ACETAMINOPHEN 325 MG/1
TABLET ORAL
Status: DISPENSED
Start: 2019-03-25

## (undated) RX ORDER — PHENYLEPHRINE HCL IN 0.9% NACL 1 MG/10 ML
SYRINGE (ML) INTRAVENOUS
Status: DISPENSED
Start: 2019-03-25

## (undated) RX ORDER — LIDOCAINE HYDROCHLORIDE 10 MG/ML
INJECTION, SOLUTION EPIDURAL; INFILTRATION; INTRACAUDAL; PERINEURAL
Status: DISPENSED
Start: 2018-11-16

## (undated) RX ORDER — METOPROLOL TARTRATE 1 MG/ML
INJECTION, SOLUTION INTRAVENOUS
Status: DISPENSED
Start: 2019-03-25

## (undated) RX ORDER — DEXAMETHASONE SODIUM PHOSPHATE 4 MG/ML
INJECTION, SOLUTION INTRA-ARTICULAR; INTRALESIONAL; INTRAMUSCULAR; INTRAVENOUS; SOFT TISSUE
Status: DISPENSED
Start: 2019-03-25

## (undated) RX ORDER — SODIUM CHLORIDE 9 MG/ML
INJECTION, SOLUTION INTRAVENOUS
Status: DISPENSED
Start: 2019-03-25

## (undated) RX ORDER — CALCIUM CHLORIDE 100 MG/ML
INJECTION INTRAVENOUS; INTRAVENTRICULAR
Status: DISPENSED
Start: 2019-03-25

## (undated) RX ORDER — FENTANYL CITRATE 50 UG/ML
INJECTION, SOLUTION INTRAMUSCULAR; INTRAVENOUS
Status: DISPENSED
Start: 2018-11-16

## (undated) RX ORDER — HEPARIN SODIUM 1000 [USP'U]/ML
INJECTION, SOLUTION INTRAVENOUS; SUBCUTANEOUS
Status: DISPENSED
Start: 2018-11-16

## (undated) RX ORDER — PROPOFOL 10 MG/ML
INJECTION, EMULSION INTRAVENOUS
Status: DISPENSED
Start: 2019-03-25

## (undated) RX ORDER — FENTANYL CITRATE 50 UG/ML
INJECTION, SOLUTION INTRAMUSCULAR; INTRAVENOUS
Status: DISPENSED
Start: 2019-03-25

## (undated) RX ORDER — KETOROLAC TROMETHAMINE 30 MG/ML
INJECTION, SOLUTION INTRAMUSCULAR; INTRAVENOUS
Status: DISPENSED
Start: 2019-03-25

## (undated) RX ORDER — OXYMETAZOLINE HYDROCHLORIDE 0.05 G/100ML
SPRAY NASAL
Status: DISPENSED
Start: 2019-03-25

## (undated) RX ORDER — ONDANSETRON 2 MG/ML
INJECTION INTRAMUSCULAR; INTRAVENOUS
Status: DISPENSED
Start: 2019-03-25

## (undated) RX ORDER — LIDOCAINE HYDROCHLORIDE AND EPINEPHRINE 10; 10 MG/ML; UG/ML
INJECTION, SOLUTION INFILTRATION; PERINEURAL
Status: DISPENSED
Start: 2019-03-25

## (undated) RX ORDER — LIDOCAINE HYDROCHLORIDE 20 MG/ML
INJECTION, SOLUTION EPIDURAL; INFILTRATION; INTRACAUDAL; PERINEURAL
Status: DISPENSED
Start: 2019-03-25

## (undated) RX ORDER — ESMOLOL HYDROCHLORIDE 10 MG/ML
INJECTION INTRAVENOUS
Status: DISPENSED
Start: 2019-03-25

## (undated) RX ORDER — EPHEDRINE SULFATE 50 MG/ML
INJECTION, SOLUTION INTRAMUSCULAR; INTRAVENOUS; SUBCUTANEOUS
Status: DISPENSED
Start: 2019-03-25

## (undated) RX ORDER — CEFAZOLIN SODIUM 2 G/100ML
INJECTION, SOLUTION INTRAVENOUS
Status: DISPENSED
Start: 2019-03-25

## (undated) RX ORDER — SODIUM CHLORIDE 9 MG/ML
INJECTION, SOLUTION INTRAVENOUS
Status: DISPENSED
Start: 2018-11-16

## (undated) RX ORDER — ATROPINE SULFATE 0.4 MG/ML
AMPUL (ML) INJECTION
Status: DISPENSED
Start: 2019-03-25

## (undated) RX ORDER — MANNITOL 20 G/100ML
INJECTION, SOLUTION INTRAVENOUS
Status: DISPENSED
Start: 2019-03-25